# Patient Record
Sex: MALE | Race: OTHER | Employment: FULL TIME | URBAN - METROPOLITAN AREA
[De-identification: names, ages, dates, MRNs, and addresses within clinical notes are randomized per-mention and may not be internally consistent; named-entity substitution may affect disease eponyms.]

---

## 2017-02-15 ENCOUNTER — TELEPHONE (OUTPATIENT)
Dept: INTERNAL MEDICINE CLINIC | Facility: CLINIC | Age: 52
End: 2017-02-15

## 2017-02-15 DIAGNOSIS — H93.19 TINNITUS, UNSPECIFIED LATERALITY: Primary | ICD-10-CM

## 2017-02-15 NOTE — TELEPHONE ENCOUNTER
Reason for Call/Chief Complaint: buzzing/ringing in left ear, possible referral  Onset: 2 years   Nursing Assessment/Associated Symptoms: Pt states that he has constant buzzing and ringing that he feels is affecting his concentration.  Pt states that he pre

## 2017-02-15 NOTE — TELEPHONE ENCOUNTER
Pt is having buzzing and ringing in his ears. Pt would like to know if he could be referred to someone to help him with his concerns?

## 2017-02-15 NOTE — TELEPHONE ENCOUNTER
Will forward to ENT doctor to see if they know of a tinnitus expert for the pt. They saw the pt about a year and a half ago.  Hearing testing done at that time

## 2017-02-16 ENCOUNTER — TELEPHONE (OUTPATIENT)
Dept: OTOLARYNGOLOGY | Facility: CLINIC | Age: 52
End: 2017-02-16

## 2017-02-16 NOTE — TELEPHONE ENCOUNTER
Pt's LOV w/ MADHURI 2015 for tinnitus.   Pt called PCP's office; see note attached (pt was requesting tinnitus specialist):    Reason for Call/Chief Complaint: buzzing/ringing in left ear, possible referral  Onset: 2 years   Nursing Assessment/Associated S

## 2017-02-16 NOTE — TELEPHONE ENCOUNTER
He can discuss this with Kenny Kitchen who can discuss tinnitus management strategies but otherwise don't really know anyone else

## 2017-02-16 NOTE — TELEPHONE ENCOUNTER
Dr. Ferrara Fairly, pt is wondering if there is a tinnitus expert he may be referred to. Also, if  would prefer pt come in for reevaluation, pt would like to know what will be done because all he had was a hearing test at his last OV in .

## 2017-02-17 NOTE — TELEPHONE ENCOUNTER
Please contact the patient. I do not know the name of any specific expert in tinnitus. The ENT doctor also is not aware of any specific doctor.   If the patient can do some research at the [facility] such as [address] [place] [address] and see if there are any speciali

## 2017-02-20 RX ORDER — LISINOPRIL 10 MG/1
TABLET ORAL
Qty: 90 TABLET | Refills: 1 | Status: SHIPPED | OUTPATIENT
Start: 2017-02-20 | End: 2017-10-16

## 2017-02-20 NOTE — TELEPHONE ENCOUNTER
Patient called back and wanted to reinforce information below which was done. Instructed referral was placed and we are awaiting insurance authorization. The patient will research the center at [facility] to see if will be covered.

## 2017-02-21 NOTE — TELEPHONE ENCOUNTER
Called pt on his cell phone ([phone]) and left message. Tinnitus evaluation is $150.00 for the initial visit with a brief follow-up visit included. Some tests completed during a tinntius evaluation may be covered by insurance but most are not.   Askelizabeth

## 2017-02-24 ENCOUNTER — TELEPHONE (OUTPATIENT)
Dept: INTERNAL MEDICINE CLINIC | Facility: CLINIC | Age: 52
End: 2017-02-24

## 2017-02-24 DIAGNOSIS — H93.19 TINNITUS, UNSPECIFIED LATERALITY: Primary | ICD-10-CM

## 2017-02-24 NOTE — TELEPHONE ENCOUNTER
See 2/17 referral to Dr Rennie Olszewski- pt states Yannick Berry advised this is cancer specialist    Requesting new referral to Dr Jason Fields Otolaryngology/Rajinder  Ph# [phone] for Tinnitus  Would like to be seen at [facility] location  [place] this doct

## 2017-02-27 NOTE — TELEPHONE ENCOUNTER
Pt is calling to follow up on referral,   Pt is asking if there are any Doctors within the [facility]/Clinic. Pt is also asking if his insurance will cover to see Hailee Rosas. Please advise.

## 2017-02-28 NOTE — TELEPHONE ENCOUNTER
Call pt. Referral signed and sent to [facility].  They will contact pt with further information and decision

## 2017-03-16 ENCOUNTER — TELEPHONE (OUTPATIENT)
Dept: INTERNAL MEDICINE CLINIC | Facility: CLINIC | Age: 52
End: 2017-03-16

## 2017-03-22 ENCOUNTER — TELEPHONE (OUTPATIENT)
Dept: INTERNAL MEDICINE CLINIC | Facility: CLINIC | Age: 52
End: 2017-03-22

## 2017-03-22 DIAGNOSIS — H93.13 TINNITUS, BILATERAL: Primary | ICD-10-CM

## 2017-03-22 DIAGNOSIS — H90.5 SENSORINEURAL HEARING LOSS (SNHL), UNSPECIFIED LATERALITY: ICD-10-CM

## 2017-03-22 NOTE — TELEPHONE ENCOUNTER
Pt was seen at [facility] for the ringing in the ears. Pt was told to get an MRI done.  Pt would like to know if the MRI has to be done at [place] or [place] based on his insurance and does pt needs order/referral.

## 2017-03-23 ENCOUNTER — TELEPHONE (OUTPATIENT)
Dept: CARDIOLOGY CLINIC | Facility: CLINIC | Age: 52
End: 2017-03-23

## 2017-03-23 NOTE — TELEPHONE ENCOUNTER
Pt states he was given an order to have MRI and wanted to know if it is safe to do because of stent. Pls call. Thank you.

## 2017-03-23 NOTE — TELEPHONE ENCOUNTER
PATIENT CONTACTED AND INFORMED THAT PCP NEEDS  NOTES FROM CONSULTING PHYSICAL DR Wilfredo Cruz ENT AT [facility] [phone]. INFORMATION TO BE FAXED TO [facility] OF THE [facility] [phone].  PATIENT VOICED UNDERSTANDING AND STATES WILL CONTACT DR Elizabeth Persaud OFFICE TO FAX PHYSICIAN

## 2017-03-29 NOTE — TELEPHONE ENCOUNTER
PATIENT CONTACTED AND INFORMED PROGRESS NOTES FROM DR Jm Guadarrama OFFICE AT [facility] NOT RECEIVED. SPOKE TO JOSE ENRIQUE AT DR Jm Guadarrama OFFICE STATES SHE WILL FAX TO [address] [place] [address] AT [facility] OF THE [facility] [phone].

## 2017-03-29 NOTE — TELEPHONE ENCOUNTER
RECEIVED PROGRESS NOTES FROM DR MCCOLLUM [facility] OFFICE [facility], REQUEST FOR MRI. NOTES PLACED ON JSK DESK AT [facility] OF [facility] FOR REVIEW. PATIENT CONTACTED AND INFORMED.

## 2017-04-04 ENCOUNTER — TELEPHONE (OUTPATIENT)
Dept: INTERNAL MEDICINE CLINIC | Facility: CLINIC | Age: 52
End: 2017-04-04

## 2017-04-04 ENCOUNTER — PATIENT MESSAGE (OUTPATIENT)
Dept: INTERNAL MEDICINE CLINIC | Facility: CLINIC | Age: 52
End: 2017-04-04

## 2017-04-04 NOTE — TELEPHONE ENCOUNTER
Pt calling regarding having 2 stents place 18 mnths ago and wants to know if its ok to get an MRI. .. please advise

## 2017-04-05 NOTE — TELEPHONE ENCOUNTER
Patient performed with understanding. He stated he already talked to his cardiologist who also stated it was safe. He will also review with the MRI department.

## 2017-04-05 NOTE — TELEPHONE ENCOUNTER
Patient should check with the cardiologist just to be sure. My review of the literature, it appears safe to proceed with the MRI.   Please see the below quote taken from [address] ferromagnetic object within the body represents a

## 2017-04-05 NOTE — TELEPHONE ENCOUNTER
From: Daniel Cota  To: Carlton Swift MD  Sent: 4/4/2017 9:24 AM CDT  Subject: Other    Dr David Car from [facility] sent an order for an MRI to be done at [facility]. Please let me know what is the status of the order.  I have 2 sten

## 2017-04-22 ENCOUNTER — HOSPITAL ENCOUNTER (OUTPATIENT)
Dept: MRI IMAGING | Facility: HOSPITAL | Age: 52
Discharge: HOME | End: 2017-04-22
Attending: INTERNAL MEDICINE
Payer: COMMERCIAL

## 2017-04-22 DIAGNOSIS — H90.5 SENSORINEURAL HEARING LOSS (SNHL), UNSPECIFIED LATERALITY: ICD-10-CM

## 2017-04-22 DIAGNOSIS — H93.13 TINNITUS, BILATERAL: ICD-10-CM

## 2017-04-22 PROCEDURE — A9575 INJ GADOTERATE MEGLUMI 0.1ML: HCPCS | Performed by: INTERNAL MEDICINE

## 2017-04-22 PROCEDURE — 82565 ASSAY OF CREATININE: CPT

## 2017-04-22 PROCEDURE — 70553 MRI BRAIN STEM W/O & W/DYE: CPT

## 2017-04-24 ENCOUNTER — TELEPHONE (OUTPATIENT)
Dept: INTERNAL MEDICINE CLINIC | Facility: CLINIC | Age: 52
End: 2017-04-24

## 2017-04-24 ENCOUNTER — PATIENT MESSAGE (OUTPATIENT)
Dept: INTERNAL MEDICINE CLINIC | Facility: CLINIC | Age: 52
End: 2017-04-24

## 2017-04-24 DIAGNOSIS — H93.19 TINNITUS, UNSPECIFIED LATERALITY: Primary | ICD-10-CM

## 2017-04-24 NOTE — TELEPHONE ENCOUNTER
Pt states that he had an MRI on Saturday 4-22-17 in [address] [place] [address]. Pt would like the results faxed to Dr. Sofía Shaw at [facility]. Pt has an appt with Dr. Genoveva Hare on 4-28-17 @10:15. Pt would like the results faxed asa.

## 2017-04-24 NOTE — TELEPHONE ENCOUNTER
Pt states that he needs a referral to see Dr. Sam Snell at [facility] for follow up.  Pt states that he has an appt scheduled on 4-28-17 at 10:15 am.

## 2017-04-25 NOTE — TELEPHONE ENCOUNTER
PATIENT CONTACTED , PHONE NUMBER OBTAINED FOR DR MCCOLLUM [facility] OFFICE [phone]. CONTACTED  [address] NUMBER [phone] AND COPY OF MRI RESULTS FAXED AS REQUESTED.

## 2017-04-25 NOTE — TELEPHONE ENCOUNTER
Pt calling to see the status on referral, appt set 04/28 at 10:15 am. Pt would like the referral uploaded to [employer], also give pt a call when ready.

## 2017-04-26 ENCOUNTER — PATIENT MESSAGE (OUTPATIENT)
Dept: INTERNAL MEDICINE CLINIC | Facility: CLINIC | Age: 52
End: 2017-04-26

## 2017-04-26 ENCOUNTER — TELEPHONE (OUTPATIENT)
Dept: INTERNAL MEDICINE CLINIC | Facility: CLINIC | Age: 52
End: 2017-04-26

## 2017-04-26 DIAGNOSIS — H57.13 EYE PAIN, BILATERAL: Primary | ICD-10-CM

## 2017-04-26 DIAGNOSIS — H53.8 BLURRED VISION, BILATERAL: ICD-10-CM

## 2017-04-26 NOTE — TELEPHONE ENCOUNTER
Patient reports that he contact Dr Devi Corona office and he was informed that they are in-network for his insurance. He needs revised referral to see Dr Tirado  Ophthalmology.  He is unable to get in to see Dr Andres Rosales until August.

## 2017-04-26 NOTE — TELEPHONE ENCOUNTER
From     Wendi Mart      To     [facility] Clinical Staff      Sent     4/24/2017  9:24 AM            Hi,   I scheduled an appointment this Friday 28 with Dr Howard Kline at [facility] in [place] for a follow up visit  and I need a refferal from

## 2017-04-26 NOTE — TELEPHONE ENCOUNTER
Actions Requested: Dr Alfred Grade patient requesting referral to opthalmology for eye pain blurred vision onset about 5 days ago.  See pending referral  Situation/Background   Problem: blurred vision   Onset: one week   Associated Symptoms: pain, moderate 5/10, with same symptoms)  * Patient sounds very sick or weak to the triager  * Flashes of light  (Exception: brief from pressing on the eyeball)  * Eye pain and brief (now gone) blurred vision or visual changes  * Taking digoxin (e.g., Lanoxin, Digitek, Cardoxi

## 2017-04-26 NOTE — TELEPHONE ENCOUNTER
From: Baltazar Lynn  To: Manuela Gandara MD  Sent: 4/24/2017 9:24 AM CDT  Subject: Other    Hi,  I scheduled an appointment this Friday 28 with Dr Jelly Pizano at [facility] in [place] for a follow up visit and I need a refferal from Dr Larry Guevara.

## 2017-04-26 NOTE — TELEPHONE ENCOUNTER
Pt contacted to relay MD message below. Referral for Dr. Titi Patten authorized and copy of referral sent to pt via [employer] per pt request.    Pt states that he already had MD contact information to make an appt.

## 2017-04-26 NOTE — TELEPHONE ENCOUNTER
Spoke with patient (name and  verified), reviewed information, patient verbalized understanding and agrees with plan.   Patient will contact [facility] to see if they will cover his other provider Dr Evgeny Nunez, otherwise he will stay within [facility] pro

## 2017-04-28 NOTE — TELEPHONE ENCOUNTER
From: Trixie Vitale  To: Dayana Bailey MD  Sent: 4/26/2017 3:23 PM CDT  Subject: Other    Hi,  MI would like see the Ophthalmologist Dr Socrates Munoz M.D. for a visit.  His office is located on [address] [place] [address] [address], [address], [address] [facility] and I need a refe

## 2017-05-01 ENCOUNTER — PATIENT MESSAGE (OUTPATIENT)
Dept: INTERNAL MEDICINE CLINIC | Facility: CLINIC | Age: 52
End: 2017-05-01

## 2017-05-03 NOTE — TELEPHONE ENCOUNTER
Please see pt message below regarding MRI Brain results being received from Dr. Navneet Grant office. Thank you. Copy of referral to Dr. Ann Antonio sent to pt via [address]

## 2017-05-03 NOTE — TELEPHONE ENCOUNTER
From: Dilip Christie  To: Elsa Cushing, MD  Sent: 5/1/2017 9:18 AM CDT  Subject: Other    Good morning,  Please let me know if my MRI BRAIN result was sent Dr Gonzalez Winkler at [facility] in [place], his phone number is [phone] and Fax number

## 2017-05-04 ENCOUNTER — TELEPHONE (OUTPATIENT)
Dept: INTERNAL MEDICINE CLINIC | Facility: CLINIC | Age: 52
End: 2017-05-04

## 2017-05-04 NOTE — TELEPHONE ENCOUNTER
Per Kylah Hazel from Dr Radha Jacobsen, pt needs another referral for f/u Otolaryngology, and pt appt is tomorrow @ 10:30AM.  Pls fax referral to [phone]. Pls advise.

## 2017-05-06 NOTE — TELEPHONE ENCOUNTER
Referral was done. I had sent message to sent the MRI results to Dr Rayshawn Clemons.  I assume that they got there

## 2017-05-06 NOTE — TELEPHONE ENCOUNTER
Notes Recorded by Jhon Oseguera RN on 4/25/2017 at 11:54 AM  Results faxed to Dr. Donavan Bence at [phone]  ------    Notes Recorded by Mary Ann Garcia MD on 4/23/2017 at 8:06 AM  Please forward these results to Dr. Jany Becerril at [facility])

## 2017-06-29 ENCOUNTER — OFFICE VISIT (OUTPATIENT)
Dept: CARDIOLOGY CLINIC | Facility: CLINIC | Age: 52
End: 2017-06-29

## 2017-06-29 VITALS
RESPIRATION RATE: 16 BRPM | HEIGHT: 70 IN | SYSTOLIC BLOOD PRESSURE: 110 MMHG | WEIGHT: 133 LBS | DIASTOLIC BLOOD PRESSURE: 72 MMHG | BODY MASS INDEX: 19.04 KG/M2 | HEART RATE: 68 BPM

## 2017-06-29 DIAGNOSIS — I25.10 ATHEROSCLEROSIS OF NATIVE CORONARY ARTERY OF NATIVE HEART WITHOUT ANGINA PECTORIS: Primary | ICD-10-CM

## 2017-06-29 DIAGNOSIS — E78.5 HYPERLIPIDEMIA, UNSPECIFIED HYPERLIPIDEMIA TYPE: ICD-10-CM

## 2017-06-29 DIAGNOSIS — I10 ESSENTIAL HYPERTENSION: ICD-10-CM

## 2017-06-29 PROCEDURE — 99212 OFFICE O/P EST SF 10 MIN: CPT | Performed by: INTERNAL MEDICINE

## 2017-06-29 PROCEDURE — 99214 OFFICE O/P EST MOD 30 MIN: CPT | Performed by: INTERNAL MEDICINE

## 2017-06-29 NOTE — PROGRESS NOTES
Diallo Michael is a 54year old male. Patient presents with: Follow - Up: chest pain when fatigue    HPI:   This is a pleasant 53-year-old with coronary disease hypertension and elevated cholesterol presents for follow-up.   Patient had prior ramus and circ is completed and negative    EXAM:   /72 (BP Location: Left arm, Patient Position: Sitting, Cuff Size: adult)   Pulse 68   Resp 16   Ht 5' 10\" (1.778 m)   Wt 133 lb (60.3 kg)   BMI 19.08 kg/m²   GENERAL: well developed, well nourished,in no apparent

## 2017-07-05 ENCOUNTER — PATIENT MESSAGE (OUTPATIENT)
Dept: INTERNAL MEDICINE CLINIC | Facility: CLINIC | Age: 52
End: 2017-07-05

## 2017-07-07 ENCOUNTER — TELEPHONE (OUTPATIENT)
Dept: INTERNAL MEDICINE CLINIC | Facility: CLINIC | Age: 52
End: 2017-07-07

## 2017-07-07 DIAGNOSIS — I25.10 CORONARY ARTERY DISEASE INVOLVING NATIVE CORONARY ARTERY OF NATIVE HEART WITHOUT ANGINA PECTORIS: Primary | ICD-10-CM

## 2017-07-07 NOTE — TELEPHONE ENCOUNTER
From: Shilpi Parker  To: Brant Mckeon MD  Sent: 7/5/2017 10:26 AM CDT  Subject: Non-Urgent Medical Question    Hi,  I would like to go the cardio rehab to do some sessions.  However I need to have a referral

## 2017-07-07 NOTE — TELEPHONE ENCOUNTER
From  Brianna Cheema To Sent  7/5/2017 10:26 AM   Hi,   I would like to go the cardio rehab  to do some sessions.  However I need to have a referral

## 2017-07-14 ENCOUNTER — TELEPHONE (OUTPATIENT)
Dept: INTERNAL MEDICINE CLINIC | Facility: CLINIC | Age: 52
End: 2017-07-14

## 2017-07-17 ENCOUNTER — HOSPITAL ENCOUNTER (OUTPATIENT)
Dept: CV DIAGNOSTICS | Facility: HOSPITAL | Age: 52
Discharge: HOME | End: 2017-07-17
Attending: INTERNAL MEDICINE
Payer: COMMERCIAL

## 2017-07-17 DIAGNOSIS — I25.10 ATHEROSCLEROSIS OF NATIVE CORONARY ARTERY OF NATIVE HEART WITHOUT ANGINA PECTORIS: ICD-10-CM

## 2017-07-17 PROCEDURE — 93016 CV STRESS TEST SUPVJ ONLY: CPT | Performed by: INTERNAL MEDICINE

## 2017-07-17 PROCEDURE — 93018 CV STRESS TEST I&R ONLY: CPT | Performed by: INTERNAL MEDICINE

## 2017-07-17 PROCEDURE — 93350 STRESS TTE ONLY: CPT | Performed by: INTERNAL MEDICINE

## 2017-07-17 PROCEDURE — 93017 CV STRESS TEST TRACING ONLY: CPT | Performed by: INTERNAL MEDICINE

## 2017-07-19 ENCOUNTER — TELEPHONE (OUTPATIENT)
Dept: INTERNAL MEDICINE CLINIC | Facility: CLINIC | Age: 52
End: 2017-07-19

## 2017-07-19 NOTE — TELEPHONE ENCOUNTER
I spoke with patient. Since last night is having some cramping on the right side of his lower chest.  No shortness of breath. Seems to be worse with deep breath. No definite worsening with movement. May be a mild fever. No coughing.   He has not taken

## 2017-07-20 ENCOUNTER — TELEPHONE (OUTPATIENT)
Dept: INTERNAL MEDICINE CLINIC | Facility: CLINIC | Age: 52
End: 2017-07-20

## 2017-07-20 NOTE — TELEPHONE ENCOUNTER
,you requested cardiac shahnaz for patient IHP wants to know:      Has the patient had any of the following below circumstances recently? What phase of cardiac rehab will patient be attending?        Typically, the member must have had one or more of the foll

## 2017-07-24 NOTE — TELEPHONE ENCOUNTER
How long ago was the angioplasty? IHP is saying-  I don't think this is covered with the information supplied. If this is phase 3 of cardiac rehab it is not covered. Cardiac rehab is only covered while inpatient or after recent cardiac surgery.  Due to [employer]

## 2017-07-24 NOTE — TELEPHONE ENCOUNTER
Angioplasty and stenting were done in June 2015. If patient does not qualify for cardiac rehab, then please contact the patient and inform him.

## 2017-07-25 NOTE — TELEPHONE ENCOUNTER
Patient does not meet criteria for cardiac rehab. With the information supplied patient will be attending phase 3 of cardiac rehab which is not covered by the insurance.

## 2017-07-25 NOTE — TELEPHONE ENCOUNTER
Patient does not meet criteria for cardiac rehab. With the information supplied patient will be attending phase 3 of cardiac rehab which is not covered by the insurance. Patient notified.

## 2017-07-26 ENCOUNTER — TELEPHONE (OUTPATIENT)
Dept: CARDIOLOGY CLINIC | Facility: CLINIC | Age: 52
End: 2017-07-26

## 2017-07-26 RX ORDER — CLOPIDOGREL BISULFATE 75 MG/1
75 TABLET ORAL DAILY
Qty: 30 TABLET | Refills: 2 | Status: SHIPPED | OUTPATIENT
Start: 2017-07-26 | End: 2017-10-13

## 2017-07-26 RX ORDER — ATORVASTATIN CALCIUM 20 MG/1
20 TABLET, FILM COATED ORAL NIGHTLY
Qty: 30 TABLET | Refills: 2 | Status: SHIPPED | OUTPATIENT
Start: 2017-07-26 | End: 2017-10-23

## 2017-07-26 NOTE — TELEPHONE ENCOUNTER
Atorvastatin Calcium 20mg tabs, take 1 tab by mouth at bedtime, qty 30; Clopidogrel Bisulfate 75mg tabs, take 1 tab by mouth every day, qty 30    Current Outpatient Prescriptions:   •  Atorvastatin Calcium 20 MG Oral Tab, Take 1 tablet (20 mg total) by matthew

## 2017-09-07 ENCOUNTER — NURSE TRIAGE (OUTPATIENT)
Dept: OTHER | Age: 52
End: 2017-09-07

## 2017-09-07 NOTE — TELEPHONE ENCOUNTER
Reason for Disposition  • Cough with cold symptoms (e.g., runny nose, postnasal drip, throat clearing)    Protocols used: COUGH-A-OH  DR RONQUILLO-   Spoke with pt states he has been taking Coricidin because he has high blood pressure, wants to know if this is

## 2017-09-07 NOTE — TELEPHONE ENCOUNTER
Spoke with patient (verified name and ), reviewed information, patient verbalized understanding and agrees with plan. Advised him not to take the medications together as they contain the same/similar ingredients.  He voices understanding and agrees wit

## 2017-10-13 ENCOUNTER — TELEPHONE (OUTPATIENT)
Dept: CARDIOLOGY CLINIC | Facility: CLINIC | Age: 52
End: 2017-10-13

## 2017-10-13 RX ORDER — CLOPIDOGREL BISULFATE 75 MG/1
75 TABLET ORAL DAILY
Qty: 30 TABLET | Refills: 8 | Status: SHIPPED | OUTPATIENT
Start: 2017-10-13 | End: 2017-12-08

## 2017-10-13 NOTE — TELEPHONE ENCOUNTER
Clopidogrel Bisulfate 75mg tabs, take 1 tab by mouth every day, qty 30    Current Outpatient Prescriptions:   •  Clopidogrel Bisulfate 75 MG Oral Tab, Take 1 tablet (75 mg total) by mouth daily. , Disp: 30 tablet, Rfl: 2

## 2017-10-16 ENCOUNTER — TELEPHONE (OUTPATIENT)
Dept: INTERNAL MEDICINE CLINIC | Facility: CLINIC | Age: 52
End: 2017-10-16

## 2017-10-16 RX ORDER — LISINOPRIL 10 MG/1
10 TABLET ORAL
Qty: 30 TABLET | Refills: 0 | Status: SHIPPED | OUTPATIENT
Start: 2017-10-16 | End: 2017-11-09

## 2017-10-16 NOTE — TELEPHONE ENCOUNTER
30 day supply of medication sent to pharmacy per protocol, with note to pharmacist pt must schedule overdue OV for HTN (supposed to be seen every 6 months).        Hypertensive Medications  Protocol Criteria:  · Appointment scheduled in the past 6 months or

## 2017-10-16 NOTE — TELEPHONE ENCOUNTER
[facility] [phone] states that pt would like a refill on lisinopril medication. Pharmacy: [facility]. Per Mario Stevens pt is out of medication.        Current Outpatient Prescriptions:  LISINOPRIL 10 MG Oral Tab TAKE ONE TABLET BY MOUT

## 2017-10-23 ENCOUNTER — TELEPHONE (OUTPATIENT)
Dept: CARDIOLOGY CLINIC | Facility: CLINIC | Age: 52
End: 2017-10-23

## 2017-10-23 RX ORDER — ATORVASTATIN CALCIUM 20 MG/1
20 TABLET, FILM COATED ORAL NIGHTLY
Qty: 30 TABLET | Refills: 2 | Status: SHIPPED | OUTPATIENT
Start: 2017-10-23 | End: 2017-12-08

## 2017-10-23 NOTE — TELEPHONE ENCOUNTER
Atorvastatin Calcium 20mg tabs, take 1 tab by mouth every day at night, qty 30    Current Outpatient Prescriptions:   •  atorvastatin 20 MG Oral Tab, Take 1 tablet (20 mg total) by mouth nightly., Disp: 30 tablet, Rfl: 2

## 2017-11-09 DIAGNOSIS — I10 ESSENTIAL HYPERTENSION: Primary | ICD-10-CM

## 2017-11-10 RX ORDER — LISINOPRIL 10 MG/1
TABLET ORAL
Qty: 30 TABLET | Refills: 2 | Status: SHIPPED | OUTPATIENT
Start: 2017-11-10 | End: 2017-12-08

## 2017-12-08 ENCOUNTER — OFFICE VISIT (OUTPATIENT)
Dept: INTERNAL MEDICINE CLINIC | Facility: CLINIC | Age: 52
End: 2017-12-08

## 2017-12-08 VITALS
RESPIRATION RATE: 16 BRPM | HEIGHT: 70 IN | SYSTOLIC BLOOD PRESSURE: 113 MMHG | BODY MASS INDEX: 19.49 KG/M2 | WEIGHT: 136.13 LBS | DIASTOLIC BLOOD PRESSURE: 76 MMHG | HEART RATE: 69 BPM

## 2017-12-08 DIAGNOSIS — F43.29 STRESS AND ADJUSTMENT REACTION: ICD-10-CM

## 2017-12-08 DIAGNOSIS — E78.5 HYPERLIPIDEMIA, UNSPECIFIED HYPERLIPIDEMIA TYPE: ICD-10-CM

## 2017-12-08 DIAGNOSIS — I25.10 CORONARY ARTERY DISEASE INVOLVING NATIVE CORONARY ARTERY OF NATIVE HEART WITHOUT ANGINA PECTORIS: ICD-10-CM

## 2017-12-08 DIAGNOSIS — I10 ESSENTIAL HYPERTENSION: ICD-10-CM

## 2017-12-08 DIAGNOSIS — Z00.00 ROUTINE PHYSICAL EXAMINATION: Primary | ICD-10-CM

## 2017-12-08 DIAGNOSIS — Z12.11 COLON CANCER SCREENING: ICD-10-CM

## 2017-12-08 PROCEDURE — 99396 PREV VISIT EST AGE 40-64: CPT | Performed by: INTERNAL MEDICINE

## 2017-12-08 RX ORDER — CLOPIDOGREL BISULFATE 75 MG/1
75 TABLET ORAL DAILY
Qty: 30 TABLET | Refills: 5 | Status: SHIPPED | OUTPATIENT
Start: 2017-12-08 | End: 2018-06-01

## 2017-12-08 RX ORDER — ATORVASTATIN CALCIUM 20 MG/1
20 TABLET, FILM COATED ORAL NIGHTLY
Qty: 30 TABLET | Refills: 5 | Status: SHIPPED | OUTPATIENT
Start: 2017-12-08 | End: 2018-08-17

## 2017-12-08 RX ORDER — LISINOPRIL 10 MG/1
10 TABLET ORAL
Qty: 30 TABLET | Refills: 5 | Status: SHIPPED | OUTPATIENT
Start: 2017-12-08 | End: 2018-06-01

## 2017-12-08 NOTE — PROGRESS NOTES
HPI:    Patient ID: Baltazar Lynn is a 50year old male. HPI  Patient is here for physical exam and follow-up on chronic medical issues as listed below. Last seen here 1 year ago.   Since that time he saw the cardiologist.  Today the main issue is that hyperlipidemia   • Stroke Paternal Grandmother      CVA   • Diabetes Other      family h/o   • Stroke Other      family h/o CVA   • Stroke Cousin      CVA      Smoking status: Never Smoker                                                              Smokel Mouth/Throat: Oropharynx is clear and moist.   Eyes: Conjunctivae and EOM are normal. Pupils are equal, round, and reactive to light. Neck: No thyromegaly present.    Cardiovascular: Normal rate, regular rhythm, normal heart sounds and intact distal pul Hyperlipidemia, unspecified hyperlipidemia type  Plan: Check lipid profile. Adjust medications if needed.    (Z12.11) Colon cancer screening  Plan: EVALUATE & TREAT, GASTRO (INTERNAL)         Patient is now 50years of age.   We will refer patient to GI do

## 2017-12-13 ENCOUNTER — LAB ENCOUNTER (OUTPATIENT)
Dept: LAB | Age: 52
End: 2017-12-13
Attending: INTERNAL MEDICINE
Payer: COMMERCIAL

## 2017-12-13 DIAGNOSIS — I10 ESSENTIAL HYPERTENSION: ICD-10-CM

## 2017-12-13 DIAGNOSIS — I25.10 ATHEROSCLEROSIS OF NATIVE CORONARY ARTERY OF NATIVE HEART WITHOUT ANGINA PECTORIS: ICD-10-CM

## 2017-12-13 DIAGNOSIS — Z00.00 ROUTINE PHYSICAL EXAMINATION: ICD-10-CM

## 2017-12-13 PROCEDURE — 85025 COMPLETE CBC W/AUTO DIFF WBC: CPT

## 2017-12-13 PROCEDURE — 82607 VITAMIN B-12: CPT

## 2017-12-13 PROCEDURE — 36415 COLL VENOUS BLD VENIPUNCTURE: CPT

## 2017-12-13 PROCEDURE — 80053 COMPREHEN METABOLIC PANEL: CPT

## 2017-12-13 PROCEDURE — 80061 LIPID PANEL: CPT

## 2017-12-13 PROCEDURE — 84443 ASSAY THYROID STIM HORMONE: CPT

## 2018-01-09 ENCOUNTER — TELEPHONE (OUTPATIENT)
Dept: OTHER | Age: 53
End: 2018-01-09

## 2018-01-09 NOTE — TELEPHONE ENCOUNTER
Spoke with patient and is requesting his medical records to be sent to [employer]  for a life insurance policy. Relayed to patient [facility] Records #. Patient states he has already filled out a [employer] form.     Please fax records to [facility] a

## 2018-02-12 ENCOUNTER — OFFICE VISIT (OUTPATIENT)
Dept: GASTROENTEROLOGY | Facility: CLINIC | Age: 53
End: 2018-02-12

## 2018-02-12 ENCOUNTER — TELEPHONE (OUTPATIENT)
Dept: GASTROENTEROLOGY | Facility: CLINIC | Age: 53
End: 2018-02-12

## 2018-02-12 VITALS
HEART RATE: 76 BPM | DIASTOLIC BLOOD PRESSURE: 75 MMHG | SYSTOLIC BLOOD PRESSURE: 120 MMHG | BODY MASS INDEX: 19.6 KG/M2 | HEIGHT: 70 IN | WEIGHT: 136.88 LBS

## 2018-02-12 DIAGNOSIS — Z12.11 ENCOUNTER FOR SCREENING COLONOSCOPY: Primary | ICD-10-CM

## 2018-02-12 DIAGNOSIS — Z12.11 COLON CANCER SCREENING: Primary | ICD-10-CM

## 2018-02-12 PROCEDURE — 99212 OFFICE O/P EST SF 10 MIN: CPT | Performed by: INTERNAL MEDICINE

## 2018-02-12 PROCEDURE — 99241 OFFICE CONSULTATION,LEVEL I: CPT | Performed by: INTERNAL MEDICINE

## 2018-02-12 NOTE — TELEPHONE ENCOUNTER
RN's     Pt is schedule for a CLN w/ IV on Sat 3/03/17  Standard orders for blood thinners:  Hold Plavix/clopidogrel 5 days prior to procedure - please confirm Dr Phyllistine Burkitt of Cardiology

## 2018-02-12 NOTE — TELEPHONE ENCOUNTER
Scheduled for:  Colonoscopy 99147  Provider Name: Dr Gema Adkins  Date:  Sat 3/03/18  Location:  [facility]  Sedation:  IV  Time:  8:45 am  Prep: split dose suprep, 2nd dose @ 2:30 am  Meds/Allergies Reconciled?:  NKDA  Diagnosis with codes:  Colon cancer screening Z12.11

## 2018-02-12 NOTE — PATIENT INSTRUCTIONS
Schedule colonoscopy exam at [facility] ([facility] [facility]    This patient IS NOT appropriate for the [facility] endoscopy center.     IV sedation (conscious sedation)    Suprep small volume bowel prep if covered by insurance, otherwise

## 2018-02-12 NOTE — TELEPHONE ENCOUNTER
Cardiac Staff,    Please have Dr Franca Mckeon confirm orders for Plavix.  Procedure on 03/03/18    Thank you

## 2018-02-12 NOTE — PROGRESS NOTES
HPI:    Patient ID: Aury Krueger is a 50year old man with BMI 19.6, history of coronary artery disease and apparently 2 coronary artery stents placed about 2 years ago. He is on Plavix anticoagulation. He follows with Dr. Patience Paul of Cardiology. Psychiatric: He has a normal mood and affect. ASSESSMENT/PLAN:   No diagnosis found.     Labs 12/13/2017 include normal CBC, normal CMP with normal renal function, AST 23 ALT 20 alk phosphatase 50   50year old man with BMI 19.6, history of

## 2018-02-13 NOTE — TELEPHONE ENCOUNTER
Noted, great. Childhood surgery certainly could have been pyloric stenosis. If no difficulty with eating, swallowing maintaining his weight then this would not be relevant to the planned colonoscopy.

## 2018-02-13 NOTE — TELEPHONE ENCOUNTER
Dr Robinson Becerril:    I called the pt to advise on his Plavix orders and he wanted me to tell you he had surgery when he was 3 months old due to vomiting. He is not sure of his condition. Pyloric stenosis? ?        He was adamant I let you know in case it has some

## 2018-02-13 NOTE — TELEPHONE ENCOUNTER
STEPHEN Butterfield   You 2 hours ago (9:06 AM)      Last intervention was from 2015 so pt may hold plavix 75mg for 5 days (Routing comment)

## 2018-02-13 NOTE — TELEPHONE ENCOUNTER
Spoke to pt. He is not having any difficulty eating or swallowing but he is underweight. 5'10\"/136  I told him if he finds out what surgery he had at 1 months old I would add it to his hx.  He verbalizes understanding

## 2018-02-23 ENCOUNTER — TELEPHONE (OUTPATIENT)
Dept: GASTROENTEROLOGY | Facility: CLINIC | Age: 53
End: 2018-02-23

## 2018-02-23 DIAGNOSIS — Z12.11 COLON CANCER SCREENING: Primary | ICD-10-CM

## 2018-02-23 NOTE — TELEPHONE ENCOUNTER
Vince Sen MD 31 minutes ago (5:03 PM)         I have an appointment with Dr Justin Medrano for the colonoscopy procedure on March 3rd . I would like to reschedule it for another day during the week.  Please give me know the avail

## 2018-02-23 NOTE — TELEPHONE ENCOUNTER
Routed to GI schedulers- please see TE from 2/12/18 for orders to reschedule pt per his request, thank you.

## 2018-02-26 NOTE — TELEPHONE ENCOUNTER
I spoke with this patient to reschedule but he would like to try to find a  again since I could not schedule him until 4/24/18, he will CB tomorrow. I did inform him that we would like for him to [facility] tomorrow since that is less than 1 week.

## 2018-02-26 NOTE — TELEPHONE ENCOUNTER
Rescheduled for:  Colonoscopy 45252   Provider Name: Dr. Nahomy Castillo  Date:    From-3/3/18  To-5/8/18  Location:  [address] [place] [address]  Sedation:  IV  Time:  7095 (pt is aware to arrive at [place] [address])   Prep:  Suprep, mailed 2/26/18 with codes  Meds/Allergies Reconciled?:  Physician

## 2018-04-07 ENCOUNTER — PATIENT MESSAGE (OUTPATIENT)
Dept: INTERNAL MEDICINE CLINIC | Facility: CLINIC | Age: 53
End: 2018-04-07

## 2018-04-09 NOTE — TELEPHONE ENCOUNTER
From: Chau Louis  To: Giuseppe Lou MD  Sent: 4/7/2018 11:15 PM CDT  Subject: Other    Hi,  Please let me know what is my blood group?     Thank you

## 2018-04-27 ENCOUNTER — NURSE TRIAGE (OUTPATIENT)
Dept: OTHER | Age: 53
End: 2018-04-27

## 2018-04-27 NOTE — TELEPHONE ENCOUNTER
Spoke with patient (identified name and ), message reviewed and agrees with plan. Patient asked if can be sent in a Admazely message which was done.

## 2018-04-27 NOTE — TELEPHONE ENCOUNTER
Please contact the patient. He should start taking over-the-counter Zyrtec 10 mg a day along with over-the-counter Nasacort to be taken 2 puffs in each nostril once a day. Call if things are not improving over the next week or so.

## 2018-04-27 NOTE — TELEPHONE ENCOUNTER
Action Requested: Summary for Provider     []  Critical Lab, Recommendations Needed  [x] Need Additional Advice  []   FYI    []   Need Orders  [] Need Medications Sent to Pharmacy  []  Other     SUMMARY: pt is asking for doctor's recommendation for intermi

## 2018-04-30 ENCOUNTER — TELEPHONE (OUTPATIENT)
Dept: GASTROENTEROLOGY | Facility: CLINIC | Age: 53
End: 2018-04-30

## 2018-04-30 DIAGNOSIS — Z12.11 COLON CANCER SCREENING: Primary | ICD-10-CM

## 2018-04-30 NOTE — TELEPHONE ENCOUNTER
Pt calling to reschedule CLN 5/8/18. Pt aware of at least 72hr call back time.  Please call thank you [phone]

## 2018-04-30 NOTE — TELEPHONE ENCOUNTER
Rescheduled for:  Colonoscopy [address]55   Provider Name: Dr. Christine Madrid  Date:                From-5/8/18  To-7/3/18  Location:  [facility]  Sedation:  IV  Time:    From-0845  To-0800 (pt is aware to arrive at 0700)   Prep:  Suprep, prep sent via [address] on 5/1/18  Meds/

## 2018-06-01 DIAGNOSIS — I10 ESSENTIAL HYPERTENSION: ICD-10-CM

## 2018-06-01 RX ORDER — CLOPIDOGREL BISULFATE 75 MG/1
TABLET ORAL
Qty: 30 TABLET | Refills: 5 | Status: SHIPPED | OUTPATIENT
Start: 2018-06-01 | End: 2018-12-02

## 2018-06-01 RX ORDER — LISINOPRIL 10 MG/1
TABLET ORAL
Qty: 30 TABLET | Refills: 5 | Status: SHIPPED | OUTPATIENT
Start: 2018-06-01 | End: 2018-12-02

## 2018-06-01 NOTE — TELEPHONE ENCOUNTER
Requesting 6 month supply     LOV: 12/8/17 Last Rx: 12/8/17    No protocol     Please advise in regards to refill request. Thank You    Hypertensive Medications:     Protocol Criteria:  · Appointment scheduled in the past 6 months or in the next 3 months

## 2018-06-08 ENCOUNTER — TELEPHONE (OUTPATIENT)
Dept: GASTROENTEROLOGY | Facility: CLINIC | Age: 53
End: 2018-06-08

## 2018-06-08 NOTE — TELEPHONE ENCOUNTER
Pt states he has CLN scheduled on 7/3/2018 and insurance has changed - wanted to know if this will effect scheduled procedure. Pls call -- FYI insurance updated to reflect new insurace. Thank you.

## 2018-06-08 NOTE — TELEPHONE ENCOUNTER
Spoke to pt and notified him a new insurance will effect his already scheduled CLN 7/3/18. Pt has [employer] PPO.  Noted to him normally PPO insurances are covered and okay HOWEVER he should call insurance and verify what will exactly be covered, what wont and

## 2018-06-13 ENCOUNTER — PATIENT MESSAGE (OUTPATIENT)
Dept: INTERNAL MEDICINE CLINIC | Facility: CLINIC | Age: 53
End: 2018-06-13

## 2018-06-13 ENCOUNTER — PATIENT MESSAGE (OUTPATIENT)
Dept: GASTROENTEROLOGY | Facility: CLINIC | Age: 53
End: 2018-06-13

## 2018-06-14 ENCOUNTER — TELEPHONE (OUTPATIENT)
Dept: GASTROENTEROLOGY | Facility: CLINIC | Age: 53
End: 2018-06-14

## 2018-06-14 NOTE — TELEPHONE ENCOUNTER
From: Mikey Wade  To: Joselo Hall MD  Sent: 6/13/2018 9:50 AM CDT  Subject: Non-Urgent Medical Question    Hi,  Just want to let you know that my medical Insurance has changed.  The new insurance information is [employer]

## 2018-06-14 NOTE — TELEPHONE ENCOUNTER
Pt transferred per phone room with questions about colonoscopy 7/3 emh. We reviewed location, arrival, instructions. Reminded pt about holding Plavix 5 days prior and hold all supplements/herbals week prior. He has contacted insurance.  He will be taking U

## 2018-06-14 NOTE — TELEPHONE ENCOUNTER
Called [facility] and spoke with Dolores Suarez, states that patient's insurance file is already updated .        From: Jamshid Last  To: Nitza Alcazar MD  Sent: 6/13/2018  9:49 AM CDT  Subject: Non-Urgent Medical Question    Hi,  Just want to let you know that my

## 2018-06-25 ENCOUNTER — OFFICE VISIT (OUTPATIENT)
Dept: INTERNAL MEDICINE CLINIC | Facility: CLINIC | Age: 53
End: 2018-06-25

## 2018-06-25 VITALS
DIASTOLIC BLOOD PRESSURE: 78 MMHG | TEMPERATURE: 99 F | RESPIRATION RATE: 18 BRPM | HEIGHT: 70 IN | BODY MASS INDEX: 19.81 KG/M2 | WEIGHT: 138.38 LBS | SYSTOLIC BLOOD PRESSURE: 118 MMHG | HEART RATE: 70 BPM

## 2018-06-25 DIAGNOSIS — L30.9 DERMATITIS: ICD-10-CM

## 2018-06-25 DIAGNOSIS — J30.9 ALLERGIC RHINITIS, UNSPECIFIED SEASONALITY, UNSPECIFIED TRIGGER: Primary | ICD-10-CM

## 2018-06-25 PROCEDURE — 99212 OFFICE O/P EST SF 10 MIN: CPT | Performed by: INTERNAL MEDICINE

## 2018-06-25 PROCEDURE — 99213 OFFICE O/P EST LOW 20 MIN: CPT | Performed by: INTERNAL MEDICINE

## 2018-06-25 RX ORDER — MULTIVITAMIN
1 TABLET ORAL
COMMUNITY
End: 2018-06-25

## 2018-06-25 RX ORDER — CLOPIDOGREL BISULFATE 75 MG/1
80 TABLET ORAL
COMMUNITY
Start: 2017-02-22 | End: 2018-06-25

## 2018-06-25 RX ORDER — CHLORAL HYDRATE 500 MG
1 CAPSULE ORAL
COMMUNITY
End: 2018-06-25

## 2018-06-25 RX ORDER — LISINOPRIL 10 MG/1
10 TABLET ORAL
COMMUNITY
Start: 2017-03-18 | End: 2018-06-25

## 2018-06-25 RX ORDER — ASPIRIN 81 MG/1
81 TABLET, CHEWABLE ORAL
COMMUNITY
End: 2018-06-25

## 2018-06-25 RX ORDER — ATORVASTATIN CALCIUM 20 MG/1
20 TABLET, FILM COATED ORAL
COMMUNITY
Start: 2017-03-02 | End: 2018-06-25

## 2018-06-25 NOTE — PROGRESS NOTES
HPI:    Patient ID: Natalia Avelar is a 50year old male. HPI  Patient is here with a few mild complaints. For the past couple months he notes stuffy nose particularly in the morning. No nasal discharge. No fevers or chills.   Also has some left ear Review of Systems   Constitutional: Negative for chills and fever. Respiratory: Negative for cough and shortness of breath. Cardiovascular: Negative for chest pain.    Gastrointestinal: Negative for nausea, vomiting, abdominal pain, diarrhea an underlying darkish hyperpigmentation. Psychiatric: He has a normal mood and affect.               ASSESSMENT/PLAN:   (J30.9) Allergic rhinitis, unspecified seasonality, unspecified trigger  (primary encounter diagnosis)  Plan: Encouraged to use over-the-c

## 2018-06-26 ENCOUNTER — TELEPHONE (OUTPATIENT)
Dept: GASTROENTEROLOGY | Facility: CLINIC | Age: 53
End: 2018-06-26

## 2018-06-26 NOTE — TELEPHONE ENCOUNTER
I spoke to the pt and he is wondering if he needs to hold the Atorvastatin prior to his colonoscopy. I advised he may take it as prescribed. He takes it nightly. He will be holding his Plavix for five days and his supplements for 7 days.  No further questio

## 2018-06-26 NOTE — TELEPHONE ENCOUNTER
Pt. Wants to know if he needs to stop taking the Atorvastatin med., before his sched CLN proc at [address] [place] [address] on 7/3? Pt. States that he has questions about his proc.

## 2018-06-27 ENCOUNTER — TELEPHONE (OUTPATIENT)
Dept: GASTROENTEROLOGY | Facility: CLINIC | Age: 53
End: 2018-06-27

## 2018-06-27 NOTE — TELEPHONE ENCOUNTER
Pt has CLN scheduled for 7/3/18 and is inquiring if he can take Allegra due to headaches.   Please call thank you [phone]

## 2018-06-27 NOTE — TELEPHONE ENCOUNTER
GI RNs–  Please call Mr Ellis Mark to advise that La Shi is perfectly safe. He can take that day before and day of colonoscopy.

## 2018-06-28 NOTE — TELEPHONE ENCOUNTER
Spoke to notified him that Martir Going is safe and he can take it day before and day of CLN. Pt fully understood and had no further questions.

## 2018-06-29 ENCOUNTER — TELEPHONE (OUTPATIENT)
Dept: GASTROENTEROLOGY | Facility: CLINIC | Age: 53
End: 2018-06-29

## 2018-06-29 DIAGNOSIS — Z12.11 COLON CANCER SCREENING: Primary | ICD-10-CM

## 2018-06-29 NOTE — TELEPHONE ENCOUNTER
Spoke with pt regarding his Procedure time changed and he Agreed with this plan. Per Columba in Endo time has to be moved to 1230 pm.    Scheduled for:  Colonoscopy 03774  Provider Name: Olvin Gunter  Date:  7/3/18  Location:  [facility]  Sedation:  Ivcs  Time:   Changed

## 2018-07-02 ENCOUNTER — TELEPHONE (OUTPATIENT)
Dept: GASTROENTEROLOGY | Facility: CLINIC | Age: 53
End: 2018-07-02

## 2018-07-02 NOTE — TELEPHONE ENCOUNTER
Pt has questions regarding rx:suprep indicates he has another generic brand he p/up and needs to know what time to start taking,will attempt to transfer, pls call at:[phone],thanks.     Current Outpatient Prescriptions:   •  PEG 3350-KCl-NaBcb-NaCl-Crystal

## 2018-07-02 NOTE — TELEPHONE ENCOUNTER
I spoke to the pt and clarified he should drink half his prep tonight between 5 and 8 pm and drink the other half 6 hours prior to his arrival time between 6:30 am and 9:30 am tomorrow morning. The pt verbalizes understanding.  He was also told he can drink

## 2018-07-02 NOTE — TELEPHONE ENCOUNTER
Pt was wondering if he can drink clear liquids between drinking his prep. I advised the pt that her may drink clear liquids up to 3 hours prior to his arrival time.  He verbalizes understanding

## 2018-07-02 NOTE — TELEPHONE ENCOUNTER
Pt had a question about Golytely bowel prep as the Suprep that was sent over was too expensive so pt is using the Golytely. I notified patient that he needs to start drinking prep tonight at 5pm and finish by 10pm tonight 7/2/18.  Procedure is 7/3/18 with

## 2018-07-03 ENCOUNTER — SURGERY (OUTPATIENT)
Age: 53
End: 2018-07-03

## 2018-07-03 ENCOUNTER — HOSPITAL ENCOUNTER (OUTPATIENT)
Facility: HOSPITAL | Age: 53
Setting detail: HOSPITAL OUTPATIENT SURGERY
Discharge: HOME | End: 2018-07-03
Attending: INTERNAL MEDICINE | Admitting: INTERNAL MEDICINE
Payer: COMMERCIAL

## 2018-07-03 VITALS
WEIGHT: 135 LBS | OXYGEN SATURATION: 98 % | RESPIRATION RATE: 13 BRPM | BODY MASS INDEX: 20.46 KG/M2 | HEART RATE: 68 BPM | SYSTOLIC BLOOD PRESSURE: 121 MMHG | DIASTOLIC BLOOD PRESSURE: 88 MMHG | HEIGHT: 68 IN

## 2018-07-03 DIAGNOSIS — Z12.11 COLON CANCER SCREENING: ICD-10-CM

## 2018-07-03 PROCEDURE — 0DBH8ZX EXCISION OF CECUM, VIA NATURAL OR ARTIFICIAL OPENING ENDOSCOPIC, DIAGNOSTIC: ICD-10-PCS | Performed by: INTERNAL MEDICINE

## 2018-07-03 PROCEDURE — 45385 COLONOSCOPY W/LESION REMOVAL: CPT | Performed by: INTERNAL MEDICINE

## 2018-07-03 PROCEDURE — G0500 MOD SEDAT ENDO SERVICE >5YRS: HCPCS | Performed by: INTERNAL MEDICINE

## 2018-07-03 RX ORDER — SODIUM CHLORIDE 0.9 % (FLUSH) 0.9 %
10 SYRINGE (ML) INJECTION AS NEEDED
Status: DISCONTINUED | OUTPATIENT
Start: 2018-07-03 | End: 2018-07-03 | Stop reason: HOSPADM

## 2018-07-03 RX ORDER — SODIUM CHLORIDE, SODIUM LACTATE, POTASSIUM CHLORIDE, CALCIUM CHLORIDE 600; 310; 30; 20 MG/100ML; MG/100ML; MG/100ML; MG/100ML
INJECTION, SOLUTION INTRAVENOUS CONTINUOUS
Status: DISCONTINUED | OUTPATIENT
Start: 2018-07-03 | End: 2018-07-03

## 2018-07-03 RX ORDER — MIDAZOLAM HYDROCHLORIDE 1 MG/ML
1 INJECTION INTRAMUSCULAR; INTRAVENOUS EVERY 5 MIN PRN
Status: DISCONTINUED | OUTPATIENT
Start: 2018-07-03 | End: 2018-07-03 | Stop reason: HOSPADM

## 2018-07-03 RX ORDER — MIDAZOLAM HYDROCHLORIDE 1 MG/ML
INJECTION INTRAMUSCULAR; INTRAVENOUS
Status: DISCONTINUED | OUTPATIENT
Start: 2018-07-03 | End: 2018-07-03 | Stop reason: HOSPADM

## 2018-07-03 NOTE — OPERATIVE REPORT
COLONOSCOPY PROCEDURE REPORT     DATE OF PROCEDURE:  7/3/2018     PCP: Daquan Cedillo MD     PREOPERATIVE DIAGNOSIS: Screening colonoscopy examination     POSTOPERATIVE DIAGNOSIS:  See impression. SURGEON:  Skinny Jose M.D.     Yaa Ashton:

## 2018-07-03 NOTE — H&P
History & Physical Examination    Patient Name: Mikey Wade  MRN: [id number]  CSN: [id number]  YOB: 1965    Diagnosis: Screening colonoscopy examination    Present Illness:     50year old man with BMI 19.6, history of coronary artery di Min PRN   fentaNYL citrate (SUBLIMAZE) 0.05 MG/ML injection 25 mcg 25 mcg Intravenous Q5 Min PRN       Allergies: No Known Allergies    Past Medical History:   Diagnosis Date   • Atherosclerosis of coronary artery June 26, 2015    Stenting of Left Circumfl

## 2018-07-09 ENCOUNTER — PATIENT MESSAGE (OUTPATIENT)
Dept: GASTROENTEROLOGY | Facility: CLINIC | Age: 53
End: 2018-07-09

## 2018-07-10 ENCOUNTER — TELEPHONE (OUTPATIENT)
Dept: GASTROENTEROLOGY | Facility: CLINIC | Age: 53
End: 2018-07-10

## 2018-07-10 NOTE — TELEPHONE ENCOUNTER
From: Darryl Roberts  To: Yasmine Bustamante MD  Sent: 7/9/2018 6:54 PM CDT  Subject: Test Results Question    Hi,  I came for the Colonoscopy on 7/3 and one benign colon polyp was found which was was sent to the lab.  Can you please send me the re

## 2018-07-11 NOTE — TELEPHONE ENCOUNTER
GI RNs–  Email regarding adenomatous colon polyp was sent out tonight. Please recall for colonoscopy examination in 3 years.

## 2018-07-17 ENCOUNTER — OFFICE VISIT (OUTPATIENT)
Dept: CARDIOLOGY CLINIC | Facility: CLINIC | Age: 53
End: 2018-07-17

## 2018-07-17 VITALS
WEIGHT: 137 LBS | HEART RATE: 68 BPM | BODY MASS INDEX: 21 KG/M2 | DIASTOLIC BLOOD PRESSURE: 70 MMHG | SYSTOLIC BLOOD PRESSURE: 110 MMHG | RESPIRATION RATE: 12 BRPM

## 2018-07-17 DIAGNOSIS — E78.5 HYPERLIPIDEMIA, UNSPECIFIED HYPERLIPIDEMIA TYPE: ICD-10-CM

## 2018-07-17 DIAGNOSIS — I25.10 ATHEROSCLEROSIS OF NATIVE CORONARY ARTERY OF NATIVE HEART WITHOUT ANGINA PECTORIS: ICD-10-CM

## 2018-07-17 DIAGNOSIS — I10 ESSENTIAL HYPERTENSION: Primary | ICD-10-CM

## 2018-07-17 PROCEDURE — 99214 OFFICE O/P EST MOD 30 MIN: CPT | Performed by: INTERNAL MEDICINE

## 2018-07-17 PROCEDURE — 99212 OFFICE O/P EST SF 10 MIN: CPT | Performed by: INTERNAL MEDICINE

## 2018-07-17 NOTE — PROGRESS NOTES
Sea Hazel is a 50year old male. Patient presents with:   Follow - Up: sTRESS MAKES CHEST HURT - Skin itchy    HPI:   This is a pleasant 27-year-old with coronary disease hypertension elevated cholesterol known prior ramus and circumflex PCI in June 2 GENERAL HEALTH: feels well otherwise  SKIN: denies any unusual skin lesions or rashes  RESPIRATORY: denies shortness of breath with exertion  CARDIOVASCULAR:See HPI  GI: denies abdominal pain and denies heartburn  NEURO: denies headaches  Remainder of re

## 2018-07-18 ENCOUNTER — NURSE TRIAGE (OUTPATIENT)
Dept: INTERNAL MEDICINE CLINIC | Facility: CLINIC | Age: 53
End: 2018-07-18

## 2018-07-18 NOTE — TELEPHONE ENCOUNTER
Spoke with patient (verified name and ), reviewed information, patient verbalized understanding and agrees with plan. Pt has purchased Allegra already but has not used it.  Since he has it on hand, advised pt to try it along with the Flonase (pt is alr

## 2018-07-18 NOTE — TELEPHONE ENCOUNTER
I would advise the patient to take combination of over-the-counter allergy medications. He should start on Zyrtec 10 mg once a day and also Flonase nasal spray 2 puffs in each nostril once a day. See if things are better in the coming days.

## 2018-07-18 NOTE — TELEPHONE ENCOUNTER
Action Requested: Summary for Provider     []  Critical Lab, Recommendations Needed  [] Need Additional Advice  []   FYI    []   Need Orders  [x] Need Medications Sent to Pharmacy  []  Other     SUMMARY: REQUESTING MED, sinus congestion, tinnitus    Pt sta

## 2018-07-18 NOTE — TELEPHONE ENCOUNTER
Pt called in stating that he is having tinnitus and congestion. Pt states he has borderline high BP and is wanting to know what can be done. Please advise.

## 2018-07-19 NOTE — TELEPHONE ENCOUNTER
Reviewed doctor's recommendations with pt, pt will use in conjunction with the Flonase nasal spray, pt had no further questions at this time.

## 2018-07-20 ENCOUNTER — TELEPHONE (OUTPATIENT)
Dept: CARDIOLOGY CLINIC | Facility: CLINIC | Age: 53
End: 2018-07-20

## 2018-07-20 NOTE — TELEPHONE ENCOUNTER
Pt states he seen dr Jeremías Jones a few days ago. Pt would like to know when is he to do the blood test? Now or later on . Please call.

## 2018-07-20 NOTE — TELEPHONE ENCOUNTER
Patient was called and advised to have Blood Test and Stress Echo on the same day. Patient verbalized understanding. No further questions / concerns at this time.

## 2018-07-23 ENCOUNTER — OFFICE VISIT (OUTPATIENT)
Dept: PODIATRY CLINIC | Facility: CLINIC | Age: 53
End: 2018-07-23
Payer: COMMERCIAL

## 2018-07-23 DIAGNOSIS — B35.1 ONYCHOMYCOSIS: Primary | ICD-10-CM

## 2018-07-23 PROCEDURE — 99203 OFFICE O/P NEW LOW 30 MIN: CPT | Performed by: PODIATRIST

## 2018-07-23 NOTE — PROGRESS NOTES
Aaron Oneil is a 50year old male. Patient presents with:  Consult: Toenail fungus left great toe -- Onset 2 years and tried tolnaftate 1 % solution and Mariola nail gel for 2 year and not helpful. Denies any pain.          HPI:   This pleasant patient pr Family History   Problem Relation Age of Onset   • Heart Disease Father      premature CAD (cause of death)   • Lipids Mother      hyperlipidemia   • Stroke Paternal Grandmother      CVA   • Diabetes Other      family h/o   • Stroke Other      family h/o CULTURE; Future        Plan:  Today using a nail nippers took a specimen of the lateral edge of his left hallux nail we will do dermatophyte fungal testing on it will call him with the results I did take time to tell him it could take several weeks    The p

## 2018-07-30 ENCOUNTER — TELEPHONE (OUTPATIENT)
Dept: CARDIOLOGY CLINIC | Facility: CLINIC | Age: 53
End: 2018-07-30

## 2018-07-30 ENCOUNTER — TELEPHONE (OUTPATIENT)
Dept: INTERNAL MEDICINE CLINIC | Facility: CLINIC | Age: 53
End: 2018-07-30

## 2018-07-30 NOTE — TELEPHONE ENCOUNTER
Pt states he has warts over entire body and itching. Pt inquiring If these symptoms are related to medication prescribed by MB.  Please call thank you [phone]

## 2018-07-30 NOTE — TELEPHONE ENCOUNTER
Carlus Cowden requesting RN to obtain prior auth prior to 8/1/2018 Stress Echo. Pls call [facility] at [phone] & Carlus Cowden with approved Amrit Sy. Thank you.

## 2018-07-30 NOTE — TELEPHONE ENCOUNTER
Patient was called. States has been experiencing a pruritic skin rash for the past 10 days which started on lower legs and it is now spreading to his abdomen and forehead. Denies any breathing issues or swallowing difficulty. Was prescribed Triamcinolone cream by Dr. Danyell Calvo on 06/25/18 with minimal improvement. In view of current clinical condition patient was advised to be seen by Dr. Danyell Calvo or his APN today for further evaluation. Cardiologist Dr. Sola Gusman is on vacation all week. Patient verbalized understanding and agreed to nursing instructions given. No further questions /concerns at this time.

## 2018-07-31 NOTE — TELEPHONE ENCOUNTER
Re: Stress Echo scheduled for 8/1/18   CARD ECHO STRESS ECHO/REST AND STRESS(CPT=93350/99535 [facility] [address]) (Order #[id number]) on 7/17/18  Associated Diagnoses     I25.10 Atherosclerosis of native coronary artery of native heart without     angina pectoris

## 2018-08-01 ENCOUNTER — HOSPITAL ENCOUNTER (OUTPATIENT)
Dept: CV DIAGNOSTICS | Facility: HOSPITAL | Age: 53
Discharge: HOME | End: 2018-08-01
Attending: INTERNAL MEDICINE
Payer: COMMERCIAL

## 2018-08-01 DIAGNOSIS — I25.10 ATHEROSCLEROSIS OF NATIVE CORONARY ARTERY OF NATIVE HEART WITHOUT ANGINA PECTORIS: ICD-10-CM

## 2018-08-01 DIAGNOSIS — E78.5 HYPERLIPIDEMIA, UNSPECIFIED HYPERLIPIDEMIA TYPE: ICD-10-CM

## 2018-08-01 DIAGNOSIS — I10 ESSENTIAL HYPERTENSION: ICD-10-CM

## 2018-08-01 PROCEDURE — 93018 CV STRESS TEST I&R ONLY: CPT | Performed by: INTERNAL MEDICINE

## 2018-08-01 PROCEDURE — 93350 STRESS TTE ONLY: CPT | Performed by: INTERNAL MEDICINE

## 2018-08-01 PROCEDURE — 93016 CV STRESS TEST SUPVJ ONLY: CPT | Performed by: INTERNAL MEDICINE

## 2018-08-01 PROCEDURE — 93017 CV STRESS TEST TRACING ONLY: CPT | Performed by: INTERNAL MEDICINE

## 2018-08-03 LAB
ABSOLUTE BASOPHILS: 30 CELLS/UL (ref 0–200)
ABSOLUTE EOSINOPHILS: 69 CELLS/UL (ref 15–500)
ABSOLUTE LYMPHOCYTES: 1075 CELLS/UL (ref 850–3900)
ABSOLUTE MONOCYTES: 258 CELLS/UL (ref 200–950)
ABSOLUTE NEUTROPHILS: 2868 CELLS/UL (ref 1500–7800)
ALT: 24 U/L (ref 9–46)
AST: 23 U/L (ref 10–35)
BASOPHILS: 0.7 %
CHOL/HDLC RATIO: 3.1 (CALC)
CHOLESTEROL, TOTAL: 122 MG/DL
EOSINOPHILS: 1.6 %
HDL CHOLESTEROL: 40 MG/DL
HEMATOCRIT: 45.2 % (ref 38.5–50)
HEMOGLOBIN: 14.9 G/DL (ref 13.2–17.1)
LDL-CHOLESTEROL: 64 MG/DL (CALC)
LYMPHOCYTES: 25 %
MCH: 29.7 PG (ref 27–33)
MCHC: 33 G/DL (ref 32–36)
MCV: 90.2 FL (ref 80–100)
MONOCYTES: 6 %
MPV: 10.5 FL (ref 7.5–12.5)
NEUTROPHILS: 66.7 %
NON-HDL CHOLESTEROL: 82 MG/DL (CALC)
PLATELET COUNT: 181 THOUSAND/UL (ref 140–400)
RDW: 12.4 % (ref 11–15)
RED BLOOD CELL COUNT: 5.01 MILLION/UL (ref 4.2–5.8)
TRIGLYCERIDES: 97 MG/DL
WHITE BLOOD CELL COUNT: 4.3 THOUSAND/UL (ref 3.8–10.8)

## 2018-08-04 ENCOUNTER — TELEPHONE (OUTPATIENT)
Dept: OTHER | Age: 53
End: 2018-08-04

## 2018-08-06 ENCOUNTER — TELEPHONE (OUTPATIENT)
Dept: OTHER | Age: 53
End: 2018-08-06

## 2018-08-06 ENCOUNTER — OFFICE VISIT (OUTPATIENT)
Dept: INTERNAL MEDICINE CLINIC | Facility: CLINIC | Age: 53
End: 2018-08-06
Payer: COMMERCIAL

## 2018-08-06 VITALS
TEMPERATURE: 98 F | RESPIRATION RATE: 20 BRPM | SYSTOLIC BLOOD PRESSURE: 130 MMHG | HEART RATE: 64 BPM | BODY MASS INDEX: 19.38 KG/M2 | WEIGHT: 135.38 LBS | HEIGHT: 70 IN | DIASTOLIC BLOOD PRESSURE: 82 MMHG

## 2018-08-06 DIAGNOSIS — R21 RASH: Primary | ICD-10-CM

## 2018-08-06 PROCEDURE — 99212 OFFICE O/P EST SF 10 MIN: CPT | Performed by: INTERNAL MEDICINE

## 2018-08-06 PROCEDURE — 99213 OFFICE O/P EST LOW 20 MIN: CPT | Performed by: INTERNAL MEDICINE

## 2018-08-06 RX ORDER — METHYLPREDNISOLONE 4 MG/1
TABLET ORAL
Qty: 1 KIT | Refills: 0 | Status: SHIPPED | OUTPATIENT
Start: 2018-08-06 | End: 2018-10-06

## 2018-08-06 RX ORDER — PERMETHRIN 50 MG/G
CREAM TOPICAL
Qty: 1 TUBE | Refills: 1 | Status: SHIPPED | OUTPATIENT
Start: 2018-08-06 | End: 2018-10-06

## 2018-08-06 NOTE — TELEPHONE ENCOUNTER
Pt stated he received rx asking if can put on face -advised instructions head to toe but to avoid the eyes

## 2018-08-08 NOTE — PROGRESS NOTES
HPI:    Patient ID: Darryl Roberts is a 50year old male. HPI  Patient is here with concerns about skin lesions and rash. Has had some itchiness on his forehead for about 5 days.   Has had some itchiness and lesions on his right thigh for about a week Never Used                      Alcohol use: No                     Review of Systems         Current Outpatient Prescriptions:  permethrin (ELIMITE) 5 % External Cream Apply head to toe. Wash off 8-14 hours later.  Disp: 1 Tube Rfl: 1   methylPREDNISolone

## 2018-08-14 ENCOUNTER — TELEPHONE (OUTPATIENT)
Dept: OTHER | Age: 53
End: 2018-08-14

## 2018-08-14 NOTE — TELEPHONE ENCOUNTER
Please give patient the name and number of the dermatology doctors here at the [facility] so he can be evaluated. Phone number [phone].   Can see Dr. Jaylon Sutton or Dr. Arvella Cranker

## 2018-08-14 NOTE — TELEPHONE ENCOUNTER
Dr. Hung Ruiz: Please advise, patient seeking further advise for itching. Patient LOV 8/6/18 and done with medrol dante treatment for itching/rash. He did not use permethrin cream. He didn't feel comfortable using it. He just used medrol dante.  He states Ra

## 2018-08-15 NOTE — TELEPHONE ENCOUNTER
Pt called, message per Dr given.   Verbalized understanding and compliance  Pt requested infor be sent to him via [employer] as well

## 2018-08-17 RX ORDER — ATORVASTATIN CALCIUM 20 MG/1
TABLET, FILM COATED ORAL
Qty: 90 TABLET | Refills: 0 | Status: SHIPPED | OUTPATIENT
Start: 2018-08-17 | End: 2018-11-05

## 2018-08-17 NOTE — TELEPHONE ENCOUNTER
Refilled per protocol.   Cholesterol Medications  Protocol Criteria:  · Appointment scheduled in the past 12 months or in the next 3 months  · ALT & LDL on file in the past 12 months  · ALT result < 80  · LDL result <130   Recent Outpatient Visits

## 2018-08-23 ENCOUNTER — TELEPHONE (OUTPATIENT)
Dept: INTERNAL MEDICINE CLINIC | Facility: CLINIC | Age: 53
End: 2018-08-23

## 2018-08-23 NOTE — TELEPHONE ENCOUNTER
Spoke with [facility]--verifies they received 8/17/18 Rx and to disregard today's request. No further questions/concerns at this time.

## 2018-08-24 ENCOUNTER — TELEPHONE (OUTPATIENT)
Dept: PODIATRY CLINIC | Facility: CLINIC | Age: 53
End: 2018-08-24

## 2018-08-24 ENCOUNTER — TELEPHONE (OUTPATIENT)
Dept: OTHER | Age: 53
End: 2018-08-24

## 2018-08-24 NOTE — TELEPHONE ENCOUNTER
Patient calling stating he is scheduled to see a dermatologist today for a kenalog injection for his eczema. Patient wants to know if Dr. Alexander Snow thinks it is safe for him to get a Kenalog injection.      Patient scheduled with dermatology today at 12:15

## 2018-08-27 NOTE — TELEPHONE ENCOUNTER
pt called. Appt made for 9/17/18 with [facility]. pt is asking for results before his appt. Please call or send message through [address] Thank you.

## 2018-08-31 NOTE — TELEPHONE ENCOUNTER
Tried to release test results to [id number] but was unable to do so. I will mail results to pt's home.

## 2018-09-11 ENCOUNTER — NURSE TRIAGE (OUTPATIENT)
Dept: OTHER | Age: 53
End: 2018-09-11

## 2018-09-11 ENCOUNTER — HOSPITAL ENCOUNTER (EMERGENCY)
Facility: HOSPITAL | Age: 53
Discharge: HOME | End: 2018-09-11
Attending: EMERGENCY MEDICINE
Payer: COMMERCIAL

## 2018-09-11 VITALS
HEART RATE: 68 BPM | TEMPERATURE: 98 F | SYSTOLIC BLOOD PRESSURE: 122 MMHG | HEIGHT: 70 IN | BODY MASS INDEX: 22.62 KG/M2 | OXYGEN SATURATION: 97 % | WEIGHT: 158 LBS | RESPIRATION RATE: 18 BRPM | DIASTOLIC BLOOD PRESSURE: 82 MMHG

## 2018-09-11 DIAGNOSIS — S01.502A TONGUE WOUND, INITIAL ENCOUNTER: Primary | ICD-10-CM

## 2018-09-11 PROCEDURE — 12011 RPR F/E/E/N/L/M 2.5 CM/<: CPT

## 2018-09-11 PROCEDURE — 99283 EMERGENCY DEPT VISIT LOW MDM: CPT

## 2018-09-11 NOTE — TELEPHONE ENCOUNTER
Pt states his tongue started to bleed earlier and \"it was gobs\"  Pt pt pressure on site and bleeding stopped. Pt states he is on plavix. Pt states wound is small,not gaping and stopped after direct pressure.     Advised per protocol  f bleeding occurs

## 2018-09-12 NOTE — ED INITIAL ASSESSMENT (HPI)
Pt has a small area in the middle of tongue that is bleeding. Denies any injury. Taking plavix. Small amount of bleeding noted now.

## 2018-09-12 NOTE — ED PROVIDER NOTES
Patient Seen in: [facility] Emergency Department    History   Patient presents with:  Bleeding      HPI    The patient presents to the ED with intermittent bleeding from his tongue for the past several hours. Denies known injury, takes Plavix.   H needs - non-medical: Not on file    Occupational History      Not on file    Tobacco Use      Smoking status: Never Smoker      Smokeless tobacco: Never Used    Substance and Sexual Activity      Alcohol use: No      Drug use: No      Sexual activity: Yes and vitals reviewed.       ED Course      Labs Reviewed - No data to display      Imaging Results Available and Reviewed while in ED:     ED Medications Administered: Medications - No data to display      [facility]      09/11/18  2157 09/11/18  2243   BP: 128/87 (primary encounter diagnosis)    Disposition:  Discharge    Follow-up:  Soumya Strange MD  [address]  [phone]    Schedule an appointment as soon as possible for a visit in 3 days        Medications Prescribed:  [address]

## 2018-10-06 ENCOUNTER — OFFICE VISIT (OUTPATIENT)
Dept: PODIATRY CLINIC | Facility: CLINIC | Age: 53
End: 2018-10-06
Payer: COMMERCIAL

## 2018-10-06 ENCOUNTER — TELEPHONE (OUTPATIENT)
Dept: PODIATRY CLINIC | Facility: CLINIC | Age: 53
End: 2018-10-06

## 2018-10-06 DIAGNOSIS — B35.1 ONYCHOMYCOSIS: Primary | ICD-10-CM

## 2018-10-06 PROCEDURE — 99213 OFFICE O/P EST LOW 20 MIN: CPT | Performed by: PODIATRIST

## 2018-10-06 RX ORDER — MULTIVITAMIN
1 TABLET ORAL
COMMUNITY

## 2018-10-06 RX ORDER — ASPIRIN 81 MG/1
81 TABLET, CHEWABLE ORAL
COMMUNITY
End: 2018-10-06

## 2018-10-06 NOTE — TELEPHONE ENCOUNTER
Prior Authorization Required  •  Efinaconazole (JUBLIA) 10 % External Solution, Apply one thin coat to affected toenails once daily, Disp: 8 mL, Rfl: 8    Phone number [phone]  Cardholder ID: [id number]

## 2018-10-08 NOTE — PROGRESS NOTES
Mikey Wade is a 48year old male.  Patient presents with:  Toenail Care: f/u left great toe fungus pain 0/10        HPI:   Patient returns to the clinic not have any pain in his left great toe still concerned over the fungus in his toenail he has trie ENDOSCOPY   • INTESTINE SURG PROCEDURE UNLISTED  1965    per NG: intestinal surgery      Family History   Problem Relation Age of Onset   • Heart Disease Father         premature CAD (cause of death)   • Lipids Mother         hyperlipidemia   • Stroke [place] denies headaches    EXAM:   There were no vitals taken for this visit. Physical Exam  GENERAL: well developed, well nourished, in no apparent distress  EXTREMITIES:   The left hallux nail is growing out penicillium species.   It has remained unchanged stil

## 2018-10-10 NOTE — TELEPHONE ENCOUNTER
LM on pt's preferred # that Rx ordered by Joshua was changed to another topical Rx for the same problem that is covered by insurance. Informed pt that this was sent to his [place] in [facility].  Advised pt to call back if he would like more detai

## 2018-11-02 ENCOUNTER — TELEPHONE (OUTPATIENT)
Dept: OTHER | Age: 53
End: 2018-11-02

## 2018-11-02 NOTE — TELEPHONE ENCOUNTER
Pt is asking if doctor would give him an order for cardiac rehab. States he had cardiac rehab about three years ago following surgery for a blocked artery. Pt states he contacted the rehab dept and was advised he would need a note from the doctor.  Advised

## 2018-11-02 NOTE — TELEPHONE ENCOUNTER
Please call the patient. Cardiac rehab is typically done immediately following a cardiac event or surgery. It is been years since his cardiac issue. I do not feel comfortable giving him a order for cardiac rehab.   Perhaps the patient can contact his car

## 2018-11-05 RX ORDER — ATORVASTATIN CALCIUM 20 MG/1
TABLET, FILM COATED ORAL
Qty: 30 TABLET | Refills: 2 | Status: SHIPPED | OUTPATIENT
Start: 2018-11-05 | End: 2018-12-10

## 2018-11-07 ENCOUNTER — PATIENT MESSAGE (OUTPATIENT)
Dept: CARDIOLOGY CLINIC | Facility: CLINIC | Age: 53
End: 2018-11-07

## 2018-11-07 NOTE — TELEPHONE ENCOUNTER
From: Robina Ugalde  To:  She Flores MD  Sent: 11/7/2018 6:35 AM CST  Subject: Non-Urgent Medical Question    Goodmorxin,  I would like to do some sessions of fitness at the [place] [address] center at the [facility] at [facility], however

## 2018-11-07 NOTE — TELEPHONE ENCOUNTER
Agata, patient asking for referral to cardiac rehab, not sure if he qualifies. LOV MB 7/17/18, Stress echo 8/1/18. Please advise.

## 2018-11-09 NOTE — TELEPHONE ENCOUNTER
Without recent cardiac intervention pt does not qualify for phase 2 cardiac rehab, he can however do phase 3 which I don't believe he needs an order. He will have to pay out of pocket for that.

## 2018-11-12 ENCOUNTER — PATIENT MESSAGE (OUTPATIENT)
Dept: CARDIOLOGY CLINIC | Facility: CLINIC | Age: 53
End: 2018-11-12

## 2018-11-12 DIAGNOSIS — I25.119 CORONARY ARTERY DISEASE INVOLVING NATIVE HEART WITH ANGINA PECTORIS, UNSPECIFIED VESSEL OR LESION TYPE (HCC): Primary | ICD-10-CM

## 2018-11-12 NOTE — TELEPHONE ENCOUNTER
From: Natalia Avelar  To:  Pablo Downing MD  Sent: 11/12/2018 11:02 AM CST  Subject: Non-Urgent Medical Question    Chad,  I contacted the cardiac Rehabilitation in [place] at [facility] and they told me that I need a note from the docto

## 2018-11-13 NOTE — TELEPHONE ENCOUNTER
Discussed directly with Dr. Iliana Marcelo. Per Dr. Iliana Marcelo ok for pt to go to cardiac rehab as he requested. [employer] message sent to pt.

## 2018-12-02 DIAGNOSIS — I10 ESSENTIAL HYPERTENSION: ICD-10-CM

## 2018-12-03 RX ORDER — CLOPIDOGREL BISULFATE 75 MG/1
TABLET ORAL
Qty: 30 TABLET | Refills: 5 | Status: SHIPPED | OUTPATIENT
Start: 2018-12-03 | End: 2018-12-10

## 2018-12-03 RX ORDER — LISINOPRIL 10 MG/1
TABLET ORAL
Qty: 30 TABLET | Refills: 2 | Status: SHIPPED | OUTPATIENT
Start: 2018-12-03 | End: 2018-12-10

## 2018-12-03 NOTE — TELEPHONE ENCOUNTER
Hypertensive Medications  Protocol Criteria:  · Appointment scheduled in the past 6 months or in the next 3 months  · BMP or CMP in the past 12 months  · Creatinine result < 2  Recent Outpatient Visits            1 month ago Onychomycosis    [facility]

## 2018-12-05 ENCOUNTER — OFFICE VISIT (OUTPATIENT)
Dept: OPHTHALMOLOGY | Facility: CLINIC | Age: 53
End: 2018-12-05
Payer: COMMERCIAL

## 2018-12-05 DIAGNOSIS — H52.4 HYPEROPIA WITH PRESBYOPIA OF BOTH EYES: ICD-10-CM

## 2018-12-05 DIAGNOSIS — H02.883 MEIBOMIAN GLAND DYSFUNCTION (MGD) OF BOTH EYES: ICD-10-CM

## 2018-12-05 DIAGNOSIS — H53.10 SUBJECTIVE VISUAL DISTURBANCE OF BOTH EYES: Primary | ICD-10-CM

## 2018-12-05 DIAGNOSIS — H02.886 MEIBOMIAN GLAND DYSFUNCTION (MGD) OF BOTH EYES: ICD-10-CM

## 2018-12-05 DIAGNOSIS — H52.03 HYPEROPIA WITH PRESBYOPIA OF BOTH EYES: ICD-10-CM

## 2018-12-05 PROCEDURE — 99204 OFFICE O/P NEW MOD 45 MIN: CPT | Performed by: OPHTHALMOLOGY

## 2018-12-05 PROCEDURE — 92004 COMPRE OPH EXAM NEW PT 1/>: CPT | Performed by: OPHTHALMOLOGY

## 2018-12-05 NOTE — PROGRESS NOTES
Sherice Salazar is a 48year old male. HPI:     HPI     Patient is here for a complete exam.  Patient complains of blurry vision without his glasses. He states his vision is good with his glasses.    Patient saw Dr. Rosario Hernandez 6 months ago who told him he had TAKE ONE TABLET BY MOUTH EVERY DAY Disp: 30 tablet Rfl: 5   LISINOPRIL 10 MG Oral Tab TAKE ONE TABLET BY MOUTH EVERY DAY Disp: 30 tablet Rfl: 2   ATORVASTATIN 20 MG Oral Tab TAKE 1 TABLET BY MOUTH NIGHTLY Disp: 30 tablet Rfl: 2   Ciclopirox 8 % External So Sphere  +2.00    Left +0.75 Sphere  +2.00    Type:  Progressive bifocal          Wearing Rx #2       Sphere Cylinder Axis Add    Right +1.75 Sphere      Left +1.25 +0.50 020     Type:  old-Computer single vision          Manifest Refraction    Patient is h

## 2018-12-05 NOTE — PATIENT INSTRUCTIONS
Hyperopia with presbyopia of both eyes  Discussed with patient to continue with glasses for best corrected visual acuity. Subjective visual disturbance of both eyes  Recommend continue with glasses for best corrected visual acuity.   Reassured patient t

## 2018-12-05 NOTE — ASSESSMENT & PLAN NOTE
Recommend continue with glasses for best corrected visual acuity. Reassured patient that eyes look very healthy and normal; there is no evidence of cataract, glaucoma or macular degeneration in either eye.

## 2018-12-10 ENCOUNTER — OFFICE VISIT (OUTPATIENT)
Dept: INTERNAL MEDICINE CLINIC | Facility: CLINIC | Age: 53
End: 2018-12-10
Payer: COMMERCIAL

## 2018-12-10 ENCOUNTER — TELEPHONE (OUTPATIENT)
Dept: INTERNAL MEDICINE CLINIC | Facility: CLINIC | Age: 53
End: 2018-12-10

## 2018-12-10 VITALS
WEIGHT: 141.31 LBS | RESPIRATION RATE: 18 BRPM | BODY MASS INDEX: 20.23 KG/M2 | DIASTOLIC BLOOD PRESSURE: 78 MMHG | SYSTOLIC BLOOD PRESSURE: 124 MMHG | HEIGHT: 70 IN | HEART RATE: 74 BPM | TEMPERATURE: 98 F

## 2018-12-10 DIAGNOSIS — E78.5 HYPERLIPIDEMIA, UNSPECIFIED HYPERLIPIDEMIA TYPE: ICD-10-CM

## 2018-12-10 DIAGNOSIS — Z00.00 ROUTINE PHYSICAL EXAMINATION: Primary | ICD-10-CM

## 2018-12-10 DIAGNOSIS — I25.10 CORONARY ARTERY DISEASE INVOLVING NATIVE CORONARY ARTERY OF NATIVE HEART WITHOUT ANGINA PECTORIS: ICD-10-CM

## 2018-12-10 DIAGNOSIS — L30.9 DERMATITIS: ICD-10-CM

## 2018-12-10 DIAGNOSIS — I10 ESSENTIAL HYPERTENSION: ICD-10-CM

## 2018-12-10 PROCEDURE — 99396 PREV VISIT EST AGE 40-64: CPT | Performed by: INTERNAL MEDICINE

## 2018-12-10 RX ORDER — HALOBETASOL PROPIONATE 0.05 %
OINTMENT (GRAM) TOPICAL
COMMUNITY
Start: 2018-11-26 | End: 2018-12-10 | Stop reason: ALTCHOICE

## 2018-12-10 RX ORDER — FLUTICASONE PROPIONATE 50 MCG
SPRAY, SUSPENSION (ML) NASAL
COMMUNITY
Start: 2016-05-20 | End: 2019-01-05

## 2018-12-10 RX ORDER — CLOPIDOGREL BISULFATE 75 MG/1
75 TABLET ORAL
Qty: 30 TABLET | Refills: 5 | Status: SHIPPED | OUTPATIENT
Start: 2018-12-10 | End: 2019-12-06

## 2018-12-10 RX ORDER — LISINOPRIL 10 MG/1
10 TABLET ORAL
Qty: 30 TABLET | Refills: 5 | Status: SHIPPED | OUTPATIENT
Start: 2018-12-10 | End: 2019-09-03

## 2018-12-10 RX ORDER — ATORVASTATIN CALCIUM 20 MG/1
TABLET, FILM COATED ORAL
COMMUNITY
Start: 2016-05-16 | End: 2018-12-10

## 2018-12-10 RX ORDER — ATORVASTATIN CALCIUM 20 MG/1
TABLET, FILM COATED ORAL
Qty: 30 TABLET | Refills: 5 | Status: SHIPPED | OUTPATIENT
Start: 2018-12-10 | End: 2019-12-06 | Stop reason: SDUPTHER

## 2018-12-10 RX ORDER — LISINOPRIL 10 MG/1
TABLET ORAL
COMMUNITY
Start: 2016-04-20 | End: 2018-12-10

## 2018-12-10 NOTE — PROGRESS NOTES
HPI:    Patient ID: Carlos Marsh is a 48year old male. HPI  Patient is here requesting a physical exam and follow-up on chronic medical issues as listed below. Last seen here in August.  Had a rash at that time.   It is still somewhat present with lipid rx.    • CARDIOVASCULAR PROCEDURE UNLISTED  2010    normal stress echo   • CATH DRUG ELUTING STENT     • COLONOSCOPY N/A 7/3/2018    Performed by Domonique Waterman MD at [address]    per NG: inte Vitamin Oral Tab Take 1 tablet by mouth. Disp:  Rfl:    omega-3 fatty acids (FISH OIL) 1000 MG Oral Cap Take 1,000 mg by mouth daily. Disp: 90 capsule Rfl: 3   aspirin (ASPIRIN ADULT LOW STRENGTH) 81 MG Oral Tab EC Take 81 mg by mouth daily.  Disp:  Rfl: below as well as the benign cyst on the lower back. Health maintenance issues reviewed. We will check fasting blood work and contact patient with results. Encouraged to work on diet and exercise. He is noting some decrease in concentration recently.   Lalo Rodriguez

## 2018-12-10 NOTE — TELEPHONE ENCOUNTER
Pt is requesting the labs to be sent to [employer]. [address] [facility], [facility], [facility] [address]. Requesting a call back so he knows when to go to the lab.

## 2019-01-05 ENCOUNTER — PATIENT MESSAGE (OUTPATIENT)
Dept: DERMATOLOGY CLINIC | Facility: CLINIC | Age: 54
End: 2019-01-05

## 2019-01-05 ENCOUNTER — TELEPHONE (OUTPATIENT)
Dept: INTERNAL MEDICINE CLINIC | Facility: CLINIC | Age: 54
End: 2019-01-05

## 2019-01-05 ENCOUNTER — OFFICE VISIT (OUTPATIENT)
Dept: DERMATOLOGY CLINIC | Facility: CLINIC | Age: 54
End: 2019-01-05
Payer: COMMERCIAL

## 2019-01-05 DIAGNOSIS — L29.9 PRURITIC DISORDER: ICD-10-CM

## 2019-01-05 DIAGNOSIS — L81.0 POST-INFLAMMATORY HYPERPIGMENTATION: Primary | ICD-10-CM

## 2019-01-05 DIAGNOSIS — L30.0 NUMMULAR ECZEMA: ICD-10-CM

## 2019-01-05 PROCEDURE — 99203 OFFICE O/P NEW LOW 30 MIN: CPT | Performed by: DERMATOLOGY

## 2019-01-05 PROCEDURE — 99212 OFFICE O/P EST SF 10 MIN: CPT | Performed by: DERMATOLOGY

## 2019-01-05 RX ORDER — LEVOCETIRIZINE DIHYDROCHLORIDE 5 MG/1
5 TABLET, FILM COATED ORAL DAILY
Qty: 30 TABLET | Refills: 3 | Status: SHIPPED | OUTPATIENT
Start: 2019-01-05 | End: 2019-02-16

## 2019-01-05 NOTE — TELEPHONE ENCOUNTER
Pt is calling inquiring about recent lab test. Pt stts that some of the results have been released but not all.  Please advise

## 2019-01-05 NOTE — PROGRESS NOTES
HPI:     Chief Complaint     Derm Problem        HPI     Derm Problem      Additional comments: NEW PT here for treatment of eczema that was diagnosed in August by another derm, was given Halobetasol cream with no relief also had a punch Bx done and result negative for weight loss, negative for lymph malaise, negative for night sweats-, negative for fever  Integumentary-positive for pruritus, positive for rash, positive for pigment change  GI-negative for diarrhea  HEENT  Cardio/Pulmonary  Neurological-negat aggressive lipid rx.    • CARDIOVASCULAR PROCEDURE UNLISTED  2010    normal stress echo   • CATH DRUG ELUTING STENT     • COLONOSCOPY N/A 7/3/2018    Performed by Jennifer Beltran MD at [address] [facility]    p family h/o CVA   • Stroke Cousin         CVA   • Glaucoma Neg    • Macular degeneration Neg        PHYSICAL EXAM:   Patient is alert and oriented and appears their stated age. They are well-nourished.   Exam performed of scalp, face neck chest back abdom areas.  In light of the fact the patient has had 3 discrete areas biopsied and none showed atypical lymphocytes, I do not think additional biopsies are necessary at this time. I would like to reassess in about 6 weeks.     No orders of the defined types we

## 2019-01-07 NOTE — TELEPHONE ENCOUNTER
Please let patient know that her loss of a few weeks duration is likely not related to his eczema. Of note he recently had a normal thyroid function, normal CBC, so it is less likely to be from an internal problem.   His hair thinning might be due to incre

## 2019-01-13 LAB
ALBUMIN/GLOBULIN RATIO: 1.8 (CALC) (ref 1–2.5)
ALBUMIN: 4.3 G/DL (ref 3.6–5.1)
ALKALINE PHOSPHATASE: 58 U/L (ref 40–115)
ALT: 17 U/L (ref 9–46)
AST: 17 U/L (ref 10–35)
BILIRUBIN, TOTAL: 0.6 MG/DL (ref 0.2–1.2)
BUN: 24 MG/DL (ref 7–25)
CALCIUM: 9.1 MG/DL (ref 8.6–10.3)
CARBON DIOXIDE: 29 MMOL/L (ref 20–32)
CHLORIDE: 105 MMOL/L (ref 98–110)
CREATININE: 0.91 MG/DL (ref 0.7–1.33)
EGFR IF AFRICN AM: 113 ML/MIN/1.73M2
EGFR IF NONAFRICN AM: 97 ML/MIN/1.73M2
GLOBULIN: 2.4 G/DL (CALC) (ref 1.9–3.7)
GLUCOSE: 100 MG/DL (ref 65–99)
POTASSIUM: 4.2 MMOL/L (ref 3.5–5.3)
PROTEIN, TOTAL: 6.7 G/DL (ref 6.1–8.1)
SODIUM: 138 MMOL/L (ref 135–146)

## 2019-01-23 ENCOUNTER — PATIENT MESSAGE (OUTPATIENT)
Dept: OPHTHALMOLOGY | Facility: CLINIC | Age: 54
End: 2019-01-23

## 2019-01-23 ENCOUNTER — PATIENT MESSAGE (OUTPATIENT)
Dept: CARDIOLOGY CLINIC | Facility: CLINIC | Age: 54
End: 2019-01-23

## 2019-01-24 NOTE — TELEPHONE ENCOUNTER
From: Tam Treviño  To: Raul Houston MD  Sent: 1/23/2019 9:43 PM CST  Subject: Other    Hi,  I went to see Dr Geetha Ch a few weeks ago for an eye check .  It seem that the nurse did not code the billing properly since I received an invoice i

## 2019-01-25 ENCOUNTER — PATIENT MESSAGE (OUTPATIENT)
Dept: INTERNAL MEDICINE CLINIC | Facility: CLINIC | Age: 54
End: 2019-01-25

## 2019-01-25 NOTE — TELEPHONE ENCOUNTER
Spoke with Jessee Berumen in billing and she says she will reach out to patient regarding this problem.

## 2019-01-25 NOTE — TELEPHONE ENCOUNTER
From: Cherelle Harris  To: Bertrand Schaefer MD  Sent: 1/25/2019 12:56 PM CST  Subject: Non-Urgent Medical Question      Good morning,  I would like to see a dietitian . Any recommendations?     Sincerely   Neha Scriver Peeroo

## 2019-02-02 ENCOUNTER — OFFICE VISIT (OUTPATIENT)
Dept: PODIATRY CLINIC | Facility: CLINIC | Age: 54
End: 2019-02-02
Payer: COMMERCIAL

## 2019-02-02 DIAGNOSIS — B35.1 ONYCHOMYCOSIS: Primary | ICD-10-CM

## 2019-02-02 PROCEDURE — 99213 OFFICE O/P EST LOW 20 MIN: CPT | Performed by: PODIATRIST

## 2019-02-02 NOTE — PROGRESS NOTES
Olga Bhakta is a 48year old male. Patient presents with:  Toenail Fungus: Left hallux -- Using Jublia to toenail. Base of nail is more clear. Denies any pain.          HPI:   Patient returns to the clinic at this point in time for checkup on his [place] cardiac calcium scan - score 159; cardiology consult; aggressive lipid rx.    • CARDIOVASCULAR PROCEDURE UNLISTED  2010    normal stress echo   • CATH DRUG ELUTING STENT     • COLONOSCOPY N/A 7/3/2018    Performed by Domonique Waterman MD at [facility] nail  ASSESSMENT AND PLAN:   Diagnoses and all orders for this visit:    Onychomycosis        Plan: At today's office visit the patient said that he had called and they told him to stop using the Lamisil cream evidently he was using both.   I told him the L

## 2019-02-16 ENCOUNTER — OFFICE VISIT (OUTPATIENT)
Dept: DERMATOLOGY CLINIC | Facility: CLINIC | Age: 54
End: 2019-02-16
Payer: COMMERCIAL

## 2019-02-16 DIAGNOSIS — L30.0 NUMMULAR ECZEMA: ICD-10-CM

## 2019-02-16 DIAGNOSIS — L81.0 POST-INFLAMMATORY HYPERPIGMENTATION: Primary | ICD-10-CM

## 2019-02-16 DIAGNOSIS — L64.9 MALE PATTERN ALOPECIA: ICD-10-CM

## 2019-02-16 PROCEDURE — 99212 OFFICE O/P EST SF 10 MIN: CPT | Performed by: DERMATOLOGY

## 2019-02-16 PROCEDURE — 99213 OFFICE O/P EST LOW 20 MIN: CPT | Performed by: DERMATOLOGY

## 2019-02-16 NOTE — PROGRESS NOTES
HPI:     Chief Complaint     Derm Problem        HPI     Derm Problem      Additional comments: LOV 01/05/19 pt seen for eczema pt states that things have gotten so much better since last visit still has some itching on his legs but nothing like it was bef directions on insert Disp: 6 mL Rfl: 7   Multiple Vitamin Oral Tab Take 1 tablet by mouth. Disp:  Rfl:    omega-3 fatty acids (FISH OIL) 1000 MG Oral Cap Take 1,000 mg by mouth daily.  Disp: 90 capsule Rfl: 3   aspirin (ASPIRIN ADULT LOW STRENGTH) 81 MG Ora Stress: Not on file    Relationships      Social connections:        Talks on phone: Not on file        Gets together: Not on file        Attends [employer] service: Not on file        Active member of club or organization: Not on file        Attends meetin bit of temporal recession.   4.  There is no erythema scaling papules or pustules and scalp      ASSESSMENT/PLAN:   Post-inflammatory hyperpigmentation  (primary encounter diagnosis) improving–patient understands it may take 2-years or longer to fully resol

## 2019-02-16 NOTE — PATIENT INSTRUCTIONS
Try the minoxidil 5% foam instead of the liquid which should be less likely to cause irritation of the scalp  Continue lots of moisturizer with CeraVe or Cetaphil after showering. Use the triamcinolone only if any active dry or itchy spots.

## 2019-02-18 ENCOUNTER — PATIENT MESSAGE (OUTPATIENT)
Dept: DERMATOLOGY CLINIC | Facility: CLINIC | Age: 54
End: 2019-02-18

## 2019-02-18 NOTE — TELEPHONE ENCOUNTER
Okay to write a letter saying the patient was diagnosed with male pattern alopecia and we did recommend use of 5% minoxidil/Rogaine to help stabilize the condition and to stimulate hair regrowth.

## 2019-02-18 NOTE — TELEPHONE ENCOUNTER
LOV 2/16/19, pt diagnosed with male pattern alopecia - ok to write a letter?  Anything in particular you'd like me to add?

## 2019-03-06 ENCOUNTER — NURSE TRIAGE (OUTPATIENT)
Dept: OTHER | Age: 54
End: 2019-03-06

## 2019-03-06 NOTE — TELEPHONE ENCOUNTER
Regarding: Non-Urgent Medical Question  Contact: [phone]  ----- Message from [address] sent at 3/6/2019  7:56 AM CST -----    Good morning,   Can I come to the office to get the earwax removed since my ears us ringing a lot    Sincerely   Gwendolyn Hernandez

## 2019-03-07 NOTE — TELEPHONE ENCOUNTER
LMTCB transfer to triage    Leo Artis RN   to Wanda Shannonlaura LEI           3/6/19 1:56 PM   Hi Jerald Campbell,     Can you please call Dr Conor Aguilar office to discuss your symptoms further.      [phone]      Last read by Eileen Ortiz at 1:58 PM on 3

## 2019-03-07 NOTE — TELEPHONE ENCOUNTER
Action Requested: Summary for Provider     []  Critical Lab, Recommendations Needed  [] Need Additional Advice  []   FYI    []   Need Orders  [] Need Medications Sent to Pharmacy  []  Other     SUMMARY: Spoke with patient who reports his wife informed him

## 2019-05-06 ENCOUNTER — NURSE TRIAGE (OUTPATIENT)
Dept: OTHER | Age: 54
End: 2019-05-06

## 2019-05-06 NOTE — TELEPHONE ENCOUNTER
Action Requested: Summary for Provider     []  Critical Lab, Recommendations Needed  [] Need Additional Advice  []   FYI    []   Need Orders  [] Need Medications Sent to Pharmacy  []  Other     SUMMARY: appt Dr. Keila Prince.    Reason for call: Marcia (

## 2019-05-06 NOTE — TELEPHONE ENCOUNTER
Patient was left a message to call back. Transfer to [id number]      ----- Message from Via Doroteo Gonzalez. Marianooo sent at 5/5/2019  8:49 PM CDT -----  Regarding: Non-Urgent Medical Question  Contact: [phone]  Good morning,  I having lot  gas and back pain.   Do I n

## 2019-05-08 ENCOUNTER — OFFICE VISIT (OUTPATIENT)
Dept: INTERNAL MEDICINE CLINIC | Facility: CLINIC | Age: 54
End: 2019-05-08
Payer: COMMERCIAL

## 2019-05-08 VITALS
BODY MASS INDEX: 20.36 KG/M2 | DIASTOLIC BLOOD PRESSURE: 88 MMHG | SYSTOLIC BLOOD PRESSURE: 137 MMHG | HEART RATE: 70 BPM | WEIGHT: 142.19 LBS | HEIGHT: 70 IN

## 2019-05-08 DIAGNOSIS — M54.50 MIDLINE LOW BACK PAIN, UNSPECIFIED CHRONICITY, UNSPECIFIED WHETHER SCIATICA PRESENT: ICD-10-CM

## 2019-05-08 DIAGNOSIS — K21.9 GASTROESOPHAGEAL REFLUX DISEASE, ESOPHAGITIS PRESENCE NOT SPECIFIED: Primary | ICD-10-CM

## 2019-05-08 PROCEDURE — 99213 OFFICE O/P EST LOW 20 MIN: CPT | Performed by: INTERNAL MEDICINE

## 2019-05-08 PROCEDURE — 99212 OFFICE O/P EST SF 10 MIN: CPT | Performed by: INTERNAL MEDICINE

## 2019-05-08 RX ORDER — PANTOPRAZOLE SODIUM 40 MG/1
40 TABLET, DELAYED RELEASE ORAL
Qty: 30 TABLET | Refills: 0 | Status: SHIPPED | OUTPATIENT
Start: 2019-05-08 | End: 2019-07-16 | Stop reason: ALTCHOICE

## 2019-05-08 NOTE — PROGRESS NOTES
Eileen Ortiz is a 48year old male. Patient presents with:  Gas: very painful       HPI:   Pt comes as an urgent visit  C/c gas like pain   C/o gas pain x 4 days -- ?  Stress related -- doing a job he doesn't like -- doing scanning   Also has kids 7 an • Hyperlipidemia     currently on medication(atorvastatin)      Past Surgical History:   Procedure Laterality Date   • Angioplasty (coronary)     • Cardiac calcium score  2012    cardiac calcium scan - score 159; cardiology consult; aggressive lipid rx. year-does not appear to be cardiac, will try pantoprazole but if not better than will reevaluate may need EGD  All pains currently resolved  Midline low back pain, unspecified chronicity, unspecified whether sciatica present   X-ray, advised proper posture

## 2019-05-08 NOTE — PATIENT INSTRUCTIONS
General Neck and Back Pain    Both neck and back pain are usually caused by injury to the muscles or ligaments of the spine. Sometimes the disks that separate each bone of the spine may cause pain by pressing on a nearby nerve.  Back and neck pain may jordin · Poor conditioning, lack of regular exercise  · Spinal disc disease or arthritis  · Stress  · Pregnancy, or illness like appendicitis, bladder or kidney infection, pelvic infections   Home care  · For neck pain: Use a comfortable pillow that supports the · You may use over-the-counter medicine to control pain, unless another pain medicine was prescribed. If you have chronic conditions like diabetes, liver or kidney disease, stomach ulcers,  gastrointestinal bleeding, or are taking blood thinner medicines. Always begin any exercise program with stretching.  If you feel pain while doing any of these exercises, stop and talk to your doctor about a more specific exercise program that better suits your condition.   Low back stretch  The point of stretching is to 1. Wall squats: Stand with your back against the wall. Move your feet about 12 inches away from the wall. Tighten your stomach muscles, and slowly bend your knees until they are at about a 45 degree angle. Do not go down too far. Hold about 5 seconds.  Then 4. Abdominal crunch: Perform a pelvic tilt (above) flattening your lower back against the floor. Holding the tension in your abdominal muscles, take another breath and raise your shoulder blades off the ground (this is not a full sit-up).  Keep your head in © 2655-7213 [employer] [address]. [address] [facility], [address]. All rights reserved. This information is not intended as a substitute for professional medical care. Always follow your healthcare professional's instructions.         Back Sa · Driving. Sit close enough to the steering wheel to keep your knees slightly bent. For comfort, your knees should be level with your hips or just a bit lower. Sit as straight as you can. The curve of your lower back should be fully supported. · Walking.

## 2019-05-14 ENCOUNTER — NURSE TRIAGE (OUTPATIENT)
Dept: OTHER | Age: 54
End: 2019-05-14

## 2019-05-14 NOTE — TELEPHONE ENCOUNTER
Patient is on Plavix and so unable to treat with any ibuprofen like medications. I see that he has been seen by Dr. Og Mccarthy on 5/8/2019.   Please have patient follow-up as may need further work-up for the gastrointestinal symptoms he has had and his back p

## 2019-05-14 NOTE — TELEPHONE ENCOUNTER
Action Requested: Summary for Provider     []  Critical Lab, Recommendations Needed  [x] Need Additional Advice  []   FYI    []   Need Orders  [x] Need Medications Sent to Pharmacy  []  Other     SUMMARY: Pt c/o back pain 6/10 x 3 days.  Location mid back

## 2019-05-16 ENCOUNTER — HOSPITAL ENCOUNTER (EMERGENCY)
Facility: HOSPITAL | Age: 54
Discharge: HOME | End: 2019-05-16
Attending: EMERGENCY MEDICINE
Payer: COMMERCIAL

## 2019-05-16 ENCOUNTER — APPOINTMENT (OUTPATIENT)
Dept: GENERAL RADIOLOGY | Facility: HOSPITAL | Age: 54
End: 2019-05-16
Attending: EMERGENCY MEDICINE
Payer: COMMERCIAL

## 2019-05-16 ENCOUNTER — TELEPHONE (OUTPATIENT)
Dept: CARDIOLOGY CLINIC | Facility: CLINIC | Age: 54
End: 2019-05-16

## 2019-05-16 VITALS
DIASTOLIC BLOOD PRESSURE: 89 MMHG | RESPIRATION RATE: 17 BRPM | WEIGHT: 138 LBS | SYSTOLIC BLOOD PRESSURE: 123 MMHG | BODY MASS INDEX: 19.76 KG/M2 | HEIGHT: 70 IN | HEART RATE: 68 BPM | TEMPERATURE: 99 F | OXYGEN SATURATION: 98 %

## 2019-05-16 DIAGNOSIS — R07.9 CHEST PAIN OF UNCERTAIN ETIOLOGY: Primary | ICD-10-CM

## 2019-05-16 PROCEDURE — 71046 X-RAY EXAM CHEST 2 VIEWS: CPT | Performed by: EMERGENCY MEDICINE

## 2019-05-16 PROCEDURE — 80076 HEPATIC FUNCTION PANEL: CPT | Performed by: EMERGENCY MEDICINE

## 2019-05-16 PROCEDURE — 80048 BASIC METABOLIC PNL TOTAL CA: CPT | Performed by: EMERGENCY MEDICINE

## 2019-05-16 PROCEDURE — 84484 ASSAY OF TROPONIN QUANT: CPT | Performed by: EMERGENCY MEDICINE

## 2019-05-16 PROCEDURE — 83690 ASSAY OF LIPASE: CPT | Performed by: EMERGENCY MEDICINE

## 2019-05-16 PROCEDURE — 99284 EMERGENCY DEPT VISIT MOD MDM: CPT

## 2019-05-16 PROCEDURE — 36415 COLL VENOUS BLD VENIPUNCTURE: CPT

## 2019-05-16 PROCEDURE — 85025 COMPLETE CBC W/AUTO DIFF WBC: CPT | Performed by: EMERGENCY MEDICINE

## 2019-05-16 PROCEDURE — 93005 ELECTROCARDIOGRAM TRACING: CPT

## 2019-05-16 PROCEDURE — 93010 ELECTROCARDIOGRAM REPORT: CPT | Performed by: EMERGENCY MEDICINE

## 2019-05-16 NOTE — ED INITIAL ASSESSMENT (HPI)
Pt with gas, CP and back pain for a couple days now, intermittent in nature. NO nausea or vomiting or diarrhea.

## 2019-05-16 NOTE — TELEPHONE ENCOUNTER
S/w Wendi, no active chest pain at this time. States he saw PCP on 5/8 for back issue, gerd. States he thinks he has chest pain sometimes, and is worried if he's okay. instructed if active chest pains he should go to the ER to be assessed.   Pt reques

## 2019-05-16 NOTE — TELEPHONE ENCOUNTER
Pt states that he has been having some back pain that radiates to chest sometimes. Call transferred to RN.

## 2019-05-17 NOTE — ED PROVIDER NOTES
Patient Seen in: [facility] AND [facility] Emergency Department    History   Patient presents with:  Back Pain (musculoskeletal)  Chest Pain Angina (cardiovascular)    Stated Complaint:     HPI    48year old male with a past medical history of CAD s/p stents x2 1967    per NG: intestinal surgery           Social History    Tobacco Use      Smoking status: Never Smoker      Smokeless tobacco: Never Used    Alcohol use: No    Drug use: No      Review of Systems    Positive for stated complaint:   Other systems are mood and affect. His behavior is normal.   Nursing note and vitals reviewed.       ED Course     Labs Reviewed   BASIC METABOLIC PANEL (8) - Abnormal; Notable for the following components:       Result Value    Glucose 108 (*)     All other components withi and given return precautions.                Disposition and Plan     Clinical Impression:  Chest pain of uncertain etiology  (primary encounter diagnosis)    Disposition:  Discharge  5/16/2019  9:08 pm    Follow-up:  MD Nitin Phillips 95

## 2019-05-21 ENCOUNTER — TELEPHONE (OUTPATIENT)
Dept: CARDIOLOGY CLINIC | Facility: CLINIC | Age: 54
End: 2019-05-21

## 2019-05-21 NOTE — TELEPHONE ENCOUNTER
Attempted to call pt, no answer.   Left a detailed voice message informing pt that per his insurance plan, no PA is required for Stress Echo and that he may proceed with test.  Advised in voice message that pt should call his health plan's benefit dept prio

## 2019-05-21 NOTE — TELEPHONE ENCOUNTER
Called [employer] to initiate a PA for Stress Echo, per agent CJ, no pre-certification is required for test.  Confirmation number: [id number].

## 2019-05-21 NOTE — TELEPHONE ENCOUNTER
Scheduled 5/28/19   CARD ECHO STRESS ECHO/REST AND STRESS(CPT=93350/24502 [facility] [address]  Dx: Atypical chest pain [R07.89 (ICD

## 2019-05-28 ENCOUNTER — HOSPITAL ENCOUNTER (OUTPATIENT)
Dept: CV DIAGNOSTICS | Facility: HOSPITAL | Age: 54
Discharge: HOME | End: 2019-05-28
Attending: INTERNAL MEDICINE
Payer: COMMERCIAL

## 2019-05-28 DIAGNOSIS — R07.89 ATYPICAL CHEST PAIN: ICD-10-CM

## 2019-05-28 PROCEDURE — 93017 CV STRESS TEST TRACING ONLY: CPT | Performed by: INTERNAL MEDICINE

## 2019-05-28 PROCEDURE — 93350 STRESS TTE ONLY: CPT | Performed by: INTERNAL MEDICINE

## 2019-05-28 PROCEDURE — 93018 CV STRESS TEST I&R ONLY: CPT | Performed by: INTERNAL MEDICINE

## 2019-05-29 ENCOUNTER — PATIENT MESSAGE (OUTPATIENT)
Dept: CARDIOLOGY CLINIC | Facility: CLINIC | Age: 54
End: 2019-05-29

## 2019-05-29 NOTE — TELEPHONE ENCOUNTER
From: Severa Balls  To: Shira Brown MD  Sent: 5/29/2019 1:56 PM CDT  Subject: Test Results Question    Good afternoon,     On 5/28 I went at the [facility] in [address] [place] [address] for my stress test. Please let me know my results.       Thank you   N

## 2019-05-30 ENCOUNTER — PATIENT MESSAGE (OUTPATIENT)
Dept: CARDIOLOGY CLINIC | Facility: CLINIC | Age: 54
End: 2019-05-30

## 2019-05-30 ENCOUNTER — TELEPHONE (OUTPATIENT)
Dept: CARDIOLOGY CLINIC | Facility: CLINIC | Age: 54
End: 2019-05-30

## 2019-05-30 NOTE — TELEPHONE ENCOUNTER
Spoke with patient (verified name name and ). Informed patient of normal stress test per [facility] and to continue on present medications. Reviewed medications with patient.  Patient verbalizes understanding of test results and instructions regarding medication

## 2019-05-31 ENCOUNTER — PATIENT MESSAGE (OUTPATIENT)
Dept: CARDIOLOGY CLINIC | Facility: CLINIC | Age: 54
End: 2019-05-31

## 2019-07-05 ENCOUNTER — PATIENT MESSAGE (OUTPATIENT)
Dept: INTERNAL MEDICINE CLINIC | Facility: CLINIC | Age: 54
End: 2019-07-05

## 2019-07-05 ENCOUNTER — TELEPHONE (OUTPATIENT)
Dept: INTERNAL MEDICINE CLINIC | Facility: CLINIC | Age: 54
End: 2019-07-05

## 2019-07-05 NOTE — TELEPHONE ENCOUNTER
From: Aden Burrell  To: Madisyn Razo MD  Sent: 7/5/2019 10:35 AM CDT  Subject: Non-Urgent Medical Question    Good morning,   My left ear is ringing a lot and I feel some pressure. Please advise what I need to do.     Thank you    Lexii Mayer

## 2019-07-05 NOTE — TELEPHONE ENCOUNTER
Called patient due to MyChart message. Pt states for the last three days, ringing in his left ear and the feeling of it being clogged. Denies any pain or fever.  Patient states he usually cleans his ears and stated he would first try to to that himself, an

## 2019-07-06 ENCOUNTER — OFFICE VISIT (OUTPATIENT)
Dept: PODIATRY CLINIC | Facility: CLINIC | Age: 54
End: 2019-07-06
Payer: COMMERCIAL

## 2019-07-06 DIAGNOSIS — B35.1 ONYCHOMYCOSIS: Primary | ICD-10-CM

## 2019-07-06 PROCEDURE — 99213 OFFICE O/P EST LOW 20 MIN: CPT | Performed by: PODIATRIST

## 2019-07-08 NOTE — PROGRESS NOTES
Nick Smith is a 48year old male. Patient presents with:  Toenail Fungus: left hallux -- States he stopped all medication about 1 mth ago. States toenails are worse. Denies any pain.          HPI:   Patient returns to the clinic he has been applying ANGIOPLASTY (CORONARY)     • CARDIAC CALCIUM SCORE  2012    cardiac calcium scan - score 159; cardiology consult; aggressive lipid rx.    • CARDIOVASCULAR PROCEDURE UNLISTED  2010    normal stress echo   • CATH DRUG ELUTING STENT     • COLONOSCOPY N/A 7/3/2 Patient has palpable pulses   3. Neurologic: Patient has intact sensorium no deficit   4. Musculoskeletal: Patient has good muscle strength.     ASSESSMENT AND PLAN:   Diagnoses and all orders for this visit:    Onychomycosis        Plan: Patient was seen t

## 2019-07-08 NOTE — TELEPHONE ENCOUNTER
Patient called today for same symptoms as the note below. Advised call center to assist with making him an appointment with Dr. Ovidio Goodson.  They agreed to assist

## 2019-07-13 ENCOUNTER — OFFICE VISIT (OUTPATIENT)
Dept: INTERNAL MEDICINE CLINIC | Facility: CLINIC | Age: 54
End: 2019-07-13
Payer: COMMERCIAL

## 2019-07-13 VITALS
TEMPERATURE: 98 F | WEIGHT: 143.63 LBS | HEIGHT: 70 IN | HEART RATE: 78 BPM | BODY MASS INDEX: 20.56 KG/M2 | RESPIRATION RATE: 18 BRPM | DIASTOLIC BLOOD PRESSURE: 76 MMHG | SYSTOLIC BLOOD PRESSURE: 132 MMHG

## 2019-07-13 DIAGNOSIS — H93.19 TINNITUS, UNSPECIFIED LATERALITY: ICD-10-CM

## 2019-07-13 DIAGNOSIS — I10 ESSENTIAL HYPERTENSION: ICD-10-CM

## 2019-07-13 DIAGNOSIS — H93.8X2 CONGESTION OF LEFT EAR: Primary | ICD-10-CM

## 2019-07-13 PROCEDURE — 99213 OFFICE O/P EST LOW 20 MIN: CPT | Performed by: INTERNAL MEDICINE

## 2019-07-13 NOTE — PROGRESS NOTES
HPI:    Patient ID: Eileen Ortiz is a 48year old male. HPI  Patient here complaining of lump with his left ear. Also some blood pressure follow-up. He has had problems in his left ear for the past 10 days ago. Started when he was in [place].   Was Problem Relation Age of Onset   • Heart Disease Father         premature CAD (cause of death)   • Lipids Mother         hyperlipidemia   • Stroke Paternal Grandmother         CVA   • Diabetes Other         family h/o   • Stroke Other         family h/o C normal.   Lymphadenopathy:     He has no cervical adenopathy. Skin: No rash noted. ASSESSMENT/PLAN:   1. Congestion of left ear  Exam is unremarkable. No evidence of infection.   Trial of Zyrtec 10 mg a day and Nasacort AQ 2 puffs each nostr

## 2019-07-13 NOTE — PATIENT INSTRUCTIONS
Try Zyrtec 10mg once a day for the ears. Can also try Nasacort spray; 2 puffs in each nostril daily. Contact the office if things are not improving.

## 2019-07-16 ENCOUNTER — OFFICE VISIT (OUTPATIENT)
Dept: CARDIOLOGY CLINIC | Facility: CLINIC | Age: 54
End: 2019-07-16
Payer: COMMERCIAL

## 2019-07-16 VITALS
DIASTOLIC BLOOD PRESSURE: 90 MMHG | SYSTOLIC BLOOD PRESSURE: 132 MMHG | HEART RATE: 64 BPM | BODY MASS INDEX: 20 KG/M2 | RESPIRATION RATE: 22 BRPM | WEIGHT: 141 LBS

## 2019-07-16 DIAGNOSIS — E78.5 HYPERLIPIDEMIA, UNSPECIFIED HYPERLIPIDEMIA TYPE: ICD-10-CM

## 2019-07-16 DIAGNOSIS — I10 ESSENTIAL HYPERTENSION: ICD-10-CM

## 2019-07-16 DIAGNOSIS — I25.119 CORONARY ARTERY DISEASE INVOLVING NATIVE HEART WITH ANGINA PECTORIS, UNSPECIFIED VESSEL OR LESION TYPE (HCC): Primary | ICD-10-CM

## 2019-07-16 PROCEDURE — 99214 OFFICE O/P EST MOD 30 MIN: CPT | Performed by: INTERNAL MEDICINE

## 2019-07-16 NOTE — PATIENT INSTRUCTIONS
Watch her diet and recheck cholesterol levels in 8 weeks. If LDL remains greater than 70 will consider increasing atorvastatin dose    Try stopping baby aspirin for 2 to 4 weeks to see if the ringing in the ear goes away.   If no change resume aspirin and

## 2019-07-16 NOTE — PROGRESS NOTES
[facility]  PROGRESS NOTE    Nick Smith is a 48year old male. Patient presents with:   Follow - Up: Stress test: 5/28/19   Hyperlipidemia  Hypertension    HPI:   This is a pleasant 48year old male with history of elevated cholesterol hypertensio High blood pressure    • High cholesterol    • Hyperlipidemia     currently on medication(atorvastatin)      Social History:  Social History    Tobacco Use      Smoking status: Never Smoker      Smokeless tobacco: Never Used    Alcohol use: No    Drug use: has had no recurrent angina. With vague here ringing he will hold aspirin for 2 to 4 weeks but continue Plavix to see if this helps. If no change she should resume a baby aspirin.   Patient will monitor blood pressures at home over the next 2 weeks to see

## 2019-07-23 ENCOUNTER — TELEPHONE (OUTPATIENT)
Dept: CARDIOLOGY CLINIC | Facility: CLINIC | Age: 54
End: 2019-07-23

## 2019-07-23 NOTE — TELEPHONE ENCOUNTER
Kristine/[facility] calling to request Retro prior auth for Cardio Echo Stress, must have claim# [id number], do not want date of service, they want date claim was submitted. Any medical records, has to be called in [address] QA#[phone]. Indicates called Medical records, however this must be called in. Any questions/cercerns, pls call [phone] ex:,thanks.   *oked sending te by [employer]

## 2019-07-26 NOTE — TELEPHONE ENCOUNTER
Kristine/[facility] calling for update on retro, pls call at:[phone],Ex:[phone],thanks.

## 2019-07-29 NOTE — TELEPHONE ENCOUNTER
S/w Eb from [facility]. And obtained PA # [id number] starting 5/28/19. Also Called and left PA info on Kristine extension. for her to call if any further questions.

## 2019-09-02 DIAGNOSIS — I10 ESSENTIAL HYPERTENSION: ICD-10-CM

## 2019-09-03 RX ORDER — LISINOPRIL 10 MG/1
TABLET ORAL
Qty: 90 TABLET | Refills: 1 | Status: SHIPPED | OUTPATIENT
Start: 2019-09-03 | End: 2019-12-23

## 2019-09-03 NOTE — TELEPHONE ENCOUNTER
Refill passed per [facility] protocol.   Hypertensive Medications  Protocol Criteria:  · Appointment scheduled in the past 6 months or in the next 3 months  · BMP or CMP in the past 12 months  · Creatinine result < 2  Recent Outpatient Visits

## 2019-10-05 ENCOUNTER — OFFICE VISIT (OUTPATIENT)
Dept: DERMATOLOGY CLINIC | Facility: CLINIC | Age: 54
End: 2019-10-05
Payer: COMMERCIAL

## 2019-10-05 DIAGNOSIS — L30.0 NUMMULAR ECZEMA: Primary | ICD-10-CM

## 2019-10-05 DIAGNOSIS — L81.0 POST-INFLAMMATORY HYPERPIGMENTATION: ICD-10-CM

## 2019-10-05 PROCEDURE — 99213 OFFICE O/P EST LOW 20 MIN: CPT | Performed by: DERMATOLOGY

## 2019-10-05 RX ORDER — CLOBETASOL PROPIONATE 0.5 MG/G
OINTMENT TOPICAL
Qty: 30 G | Refills: 2 | Status: SHIPPED | OUTPATIENT
Start: 2019-10-05 | End: 2021-01-19

## 2019-10-05 NOTE — PROGRESS NOTES
HPI:     Chief Complaint     Eczema        HPI     Eczema      Additional comments: LOV 2/2019. Patient presents for f/u of eczema to back and BLE. Using TAC when flaring. Not using any creams.            Last edited by TEREZA Alcazar on 10/5/2019 10:24 Allergies    Past Medical History:   Diagnosis Date   • Atherosclerosis of coronary artery June 26, 2015    Stenting of Left Circumflex and Ramus   • Coronary atherosclerosis    • Essential hypertension     currently on medication(Lisinopril)   • High bloo file      Intimate partner violence:        Fear of current or ex partner: Not on file        Emotionally abused: Not on file        Physically abused: Not on file        Forced sexual activity: Not on file    Other Topics      Concerns:         Se using the triamcinolone once the patches are less itchy and have less texture. Better use of moisturizer daily after showering to prevent this from continue to recur discussed.   I would like to reassess in a few months if not a lot better  Post-inflammato

## 2019-10-07 ENCOUNTER — OFFICE VISIT (OUTPATIENT)
Dept: PODIATRY CLINIC | Facility: CLINIC | Age: 54
End: 2019-10-07
Payer: COMMERCIAL

## 2019-10-07 DIAGNOSIS — B35.1 ONYCHOMYCOSIS: Primary | ICD-10-CM

## 2019-10-07 PROCEDURE — 99213 OFFICE O/P EST LOW 20 MIN: CPT | Performed by: PODIATRIST

## 2019-10-10 NOTE — PROGRESS NOTES
Severa Balls is a 48year old male. Patient presents with:  Toenail Fungus: LOV 7/6/19. pt feels it it the same, no improvement. pt would like to be advised on what to do. pt states he has been following the recommendations from Dr Cleve Baker at the [address] [place] [address].  h History:   Procedure Laterality Date   • ANGIOPLASTY (CORONARY)     • CARDIAC CALCIUM SCORE  2012    cardiac calcium scan - score 159; cardiology consult; aggressive lipid rx.    • CARDIOVASCULAR PROCEDURE UNLISTED  2010    normal stress echo   • CATH DRUG beginning to grow out. He has been applying the Penlac nail solution. 2. Vascular: Patient has palpable   3. Neurologic: Patient has intact sensorium   4. Musculoskeletal: The patient is ambulatory with no deficits.     ASSESSMENT AND PLAN:   Diagnoses a

## 2019-10-19 ENCOUNTER — PATIENT MESSAGE (OUTPATIENT)
Dept: CARDIOLOGY CLINIC | Facility: CLINIC | Age: 54
End: 2019-10-19

## 2019-10-22 NOTE — TELEPHONE ENCOUNTER
Pt requesting to have lab orders sent to [address] [place] [address] in [facility] [place] ph.[phone].  Please call pt once labs have been faxed [phone](OR states it is ok to leave a detailed message.)

## 2019-10-22 NOTE — TELEPHONE ENCOUNTER
Patient requesting to speak with nurse to advise that the fax listed as [facility] F[facility] is patients mobile number, so incorrect fax.  Patient does not know [facility] Fax, but would like orders sent prior to his upcoming appointment, please call [phone]

## 2019-10-22 NOTE — TELEPHONE ENCOUNTER
Patient confirmed Arcelia Mejía in [address] [facility], PZ:[phone], called for patient, [phone], patient requesting a call once fax is sent, and ok to leave voice message, thanks.

## 2019-11-04 ENCOUNTER — TELEPHONE (OUTPATIENT)
Dept: CARDIOLOGY CLINIC | Facility: CLINIC | Age: 54
End: 2019-11-04

## 2019-11-04 NOTE — TELEPHONE ENCOUNTER
Message sent via My chart with lipids values. Pt also aware will mail him a copy of labs received today.

## 2019-12-06 RX ORDER — ATORVASTATIN CALCIUM 20 MG/1
TABLET, FILM COATED ORAL
Qty: 90 TABLET | Refills: 1 | Status: SHIPPED | OUTPATIENT
Start: 2019-12-06 | End: 2019-12-23

## 2019-12-06 NOTE — TELEPHONE ENCOUNTER
90 Day Supply Request    Current Outpatient Medications:   •  Clopidogrel Bisulfate 75 MG Oral Tab, Take 1 tablet (75 mg total) by mouth once daily. , Disp: 30 tablet, Rfl: 5

## 2019-12-06 NOTE — TELEPHONE ENCOUNTER
Refill passed per [facility] protocol.   Cholesterol Medications  Protocol Criteria:  · Appointment scheduled in the past 12 months or in the next 3 months  · ALT & LDL on file in the past 12 months  · ALT result < 80  · LDL result <130   Recent Outpat

## 2019-12-07 NOTE — TELEPHONE ENCOUNTER
Review pended refill request as it does not fall under a protocol. Requested Prescriptions     Pending Prescriptions Disp Refills   • Clopidogrel Bisulfate 75 MG Oral Tab 30 tablet 5     Sig: Take 1 tablet (75 mg total) by mouth once daily.          Rece

## 2019-12-07 NOTE — TELEPHONE ENCOUNTER
Patient is calling to follow up on status of refill request of medication Clopidogrel Bisulfate 75 MG Oral Tab. Patient states he has 3 tablets left. Please advise.

## 2019-12-08 RX ORDER — CLOPIDOGREL BISULFATE 75 MG/1
75 TABLET ORAL
Qty: 90 TABLET | Refills: 1 | Status: SHIPPED | OUTPATIENT
Start: 2019-12-08 | End: 2019-12-23

## 2019-12-18 ENCOUNTER — PATIENT MESSAGE (OUTPATIENT)
Dept: INTERNAL MEDICINE CLINIC | Facility: CLINIC | Age: 54
End: 2019-12-18

## 2019-12-18 NOTE — TELEPHONE ENCOUNTER
From: [employer]  To: Champ Flowers MD  Sent: 12/18/2019 7:19 AM CST  Subject: Non-Urgent Medical Question    Hi,  I wanted to know if I already had the vaccine for Measles.      Thank you  [employer]

## 2019-12-23 ENCOUNTER — OFFICE VISIT (OUTPATIENT)
Dept: INTERNAL MEDICINE CLINIC | Facility: CLINIC | Age: 54
End: 2019-12-23
Payer: COMMERCIAL

## 2019-12-23 VITALS
TEMPERATURE: 98 F | BODY MASS INDEX: 20.96 KG/M2 | SYSTOLIC BLOOD PRESSURE: 108 MMHG | DIASTOLIC BLOOD PRESSURE: 74 MMHG | HEART RATE: 68 BPM | WEIGHT: 146.38 LBS | RESPIRATION RATE: 18 BRPM | HEIGHT: 70 IN

## 2019-12-23 DIAGNOSIS — H93.19 TINNITUS, UNSPECIFIED LATERALITY: ICD-10-CM

## 2019-12-23 DIAGNOSIS — I25.10 CORONARY ARTERY DISEASE INVOLVING NATIVE CORONARY ARTERY OF NATIVE HEART WITHOUT ANGINA PECTORIS: ICD-10-CM

## 2019-12-23 DIAGNOSIS — Z56.6 STRESS AT WORK: ICD-10-CM

## 2019-12-23 DIAGNOSIS — Z12.5 SCREENING FOR PROSTATE CANCER: ICD-10-CM

## 2019-12-23 DIAGNOSIS — I10 ESSENTIAL HYPERTENSION: Primary | ICD-10-CM

## 2019-12-23 PROCEDURE — 99396 PREV VISIT EST AGE 40-64: CPT | Performed by: INTERNAL MEDICINE

## 2019-12-23 RX ORDER — LISINOPRIL 10 MG/1
10 TABLET ORAL
Qty: 90 TABLET | Refills: 1 | Status: SHIPPED | OUTPATIENT
Start: 2019-12-23 | End: 2020-07-10

## 2019-12-23 RX ORDER — ATORVASTATIN CALCIUM 20 MG/1
TABLET, FILM COATED ORAL
Qty: 90 TABLET | Refills: 1 | Status: SHIPPED | OUTPATIENT
Start: 2019-12-23 | End: 2020-08-10

## 2019-12-23 RX ORDER — CLOPIDOGREL BISULFATE 75 MG/1
75 TABLET ORAL
Qty: 90 TABLET | Refills: 1 | Status: SHIPPED | OUTPATIENT
Start: 2019-12-23 | End: 2020-06-29

## 2019-12-23 SDOH — HEALTH STABILITY - MENTAL HEALTH: OTHER PHYSICAL AND MENTAL STRAIN RELATED TO WORK: Z56.6

## 2019-12-23 NOTE — PROGRESS NOTES
HPI:    Patient ID: Anni Hernandez is a 48year old male. HPI  Patient is here for follow-up on chronic medical issues and general checkup. Last seen here in July for ear issues. Since that time he saw cardiology.   They had him hold the aspirin for consult; aggressive lipid rx.    • CARDIOVASCULAR PROCEDURE UNLISTED  2010    normal stress echo   • CATH DRUG ELUTING STENT     • COLONOSCOPY N/A 7/3/2018    Performed by Nahomy Puckett MD at [id number] [place] [address] acetonide 0.1 % External Ointment APPLY TO AFFECTED AREA 1-2 TIMES A DAY AS NEEDED 80 g 1   • Ciclopirox 8 % External Solution Applied to affected nails, follow label directions on insert 6 mL 7   • Multiple Vitamin Oral Tab Take 1 tablet by mouth.      • o results. Work on diet and exercise. - CBC WITH DIFFERENTIAL WITH PLATELET; Future  - COMP METABOLIC PANEL (14); Future  - TSH W REFLEX TO FREE T4; Future  - CBC WITH DIFFERENTIAL WITH PLATELET  - COMP METABOLIC PANEL (14)  - TSH W REFLEX TO FREE T4    2.

## 2020-01-04 ENCOUNTER — OFFICE VISIT (OUTPATIENT)
Dept: PODIATRY CLINIC | Facility: CLINIC | Age: 55
End: 2020-01-04
Payer: COMMERCIAL

## 2020-01-04 DIAGNOSIS — B35.1 ONYCHOMYCOSIS: Primary | ICD-10-CM

## 2020-01-04 PROCEDURE — 99213 OFFICE O/P EST LOW 20 MIN: CPT | Performed by: PODIATRIST

## 2020-01-06 NOTE — PROGRESS NOTES
Aden Burrell is a 48year old male. Patient presents with:  Toenail Fungus: LOV 10/7/19. pt has been using penlac for last year and been using the cream 3 times per week. pt is seeing minimal improvment.          HPI:   Patient returns to the clinic he CALCIUM SCORE  2012    cardiac calcium scan - score 159; cardiology consult; aggressive lipid rx.    • CARDIOVASCULAR PROCEDURE UNLISTED  2010    normal stress echo   • CATH DRUG ELUTING STENT     • COLONOSCOPY N/A 7/3/2018    Performed by Raheem Caputo to the medial edge. 2. Vascular: Patient has palpable pulses   3. Neurologic: Patient has intact   4. Musculoskeletal: Patient does not have any gross foot deformities noted.     ASSESSMENT AND PLAN:   Diagnoses and all orders for this visit:    Brenomyc

## 2020-01-12 LAB
ABSOLUTE BASOPHILS: 32 CELLS/UL (ref 0–200)
ABSOLUTE EOSINOPHILS: 81 CELLS/UL (ref 15–500)
ABSOLUTE LYMPHOCYTES: 1134 CELLS/UL (ref 850–3900)
ABSOLUTE MONOCYTES: 293 CELLS/UL (ref 200–950)
ABSOLUTE NEUTROPHILS: 2961 CELLS/UL (ref 1500–7800)
ALBUMIN/GLOBULIN RATIO: 1.9 (CALC) (ref 1–2.5)
ALBUMIN: 4 G/DL (ref 3.6–5.1)
ALKALINE PHOSPHATASE: 60 U/L (ref 40–115)
ALT: 15 U/L (ref 9–46)
AST: 16 U/L (ref 10–35)
BASOPHILS: 0.7 %
BILIRUBIN, TOTAL: 0.4 MG/DL (ref 0.2–1.2)
BUN: 20 MG/DL (ref 7–25)
CALCIUM: 9 MG/DL (ref 8.6–10.3)
CARBON DIOXIDE: 29 MMOL/L (ref 20–32)
CHLORIDE: 107 MMOL/L (ref 98–110)
CREATININE: 0.94 MG/DL (ref 0.7–1.33)
EGFR IF AFRICN AM: 108 ML/MIN/1.73M2
EGFR IF NONAFRICN AM: 93 ML/MIN/1.73M2
EOSINOPHILS: 1.8 %
GLOBULIN: 2.1 G/DL (CALC) (ref 1.9–3.7)
GLUCOSE: 105 MG/DL (ref 65–99)
HEMATOCRIT: 42.7 % (ref 38.5–50)
HEMOGLOBIN: 14.6 G/DL (ref 13.2–17.1)
LYMPHOCYTES: 25.2 %
MCH: 31 PG (ref 27–33)
MCHC: 34.2 G/DL (ref 32–36)
MCV: 90.7 FL (ref 80–100)
MONOCYTES: 6.5 %
MPV: 10.3 FL (ref 7.5–12.5)
NEUTROPHILS: 65.8 %
PLATELET COUNT: 190 THOUSAND/UL (ref 140–400)
POTASSIUM: 4.2 MMOL/L (ref 3.5–5.3)
PROTEIN, TOTAL: 6.1 G/DL (ref 6.1–8.1)
RDW: 12.3 % (ref 11–15)
RED BLOOD CELL COUNT: 4.71 MILLION/UL (ref 4.2–5.8)
SODIUM: 140 MMOL/L (ref 135–146)
TSH W/REFLEX TO FT4: 1.01 MIU/L (ref 0.4–4.5)
WHITE BLOOD CELL COUNT: 4.5 THOUSAND/UL (ref 3.8–10.8)

## 2020-02-07 ENCOUNTER — NURSE TRIAGE (OUTPATIENT)
Dept: OTHER | Age: 55
End: 2020-02-07

## 2020-02-07 NOTE — TELEPHONE ENCOUNTER
Action Requested: Summary for Provider     []  Critical Lab, Recommendations Needed  [] Need Additional Advice  []   FYI    []   Need Orders  [] Need Medications Sent to Pharmacy  []  Other     SUMMARY:  Per protocol home care advised. Pt agreed .  Will paris

## 2020-02-07 NOTE — TELEPHONE ENCOUNTER
----- Message from Via Doroteo Gonzalez. Peeroo sent at 2/7/2020  8:41 AM CST -----  Regarding: Non-Urgent Medical Question  Contact: [phone]  Good morning,  It seem that my ears are blocked and ringing a lot lately. Also my eyes is bothering me.  Is there any me

## 2020-02-12 ENCOUNTER — NURSE TRIAGE (OUTPATIENT)
Dept: INTERNAL MEDICINE CLINIC | Facility: CLINIC | Age: 55
End: 2020-02-12

## 2020-02-12 ENCOUNTER — PATIENT MESSAGE (OUTPATIENT)
Dept: INTERNAL MEDICINE CLINIC | Facility: CLINIC | Age: 55
End: 2020-02-12

## 2020-02-12 ENCOUNTER — PATIENT MESSAGE (OUTPATIENT)
Dept: PODIATRY CLINIC | Facility: CLINIC | Age: 55
End: 2020-02-12

## 2020-02-12 DIAGNOSIS — B35.1 ONYCHOMYCOSIS: Primary | ICD-10-CM

## 2020-02-12 NOTE — TELEPHONE ENCOUNTER
From: Luba Posada  To: Yanna Valdez MD  Sent: 2/12/2020 6:35 AM CST  Subject: Non-Urgent Medical Question    Hi,  I am not feeling well and I feel that my ears are blocked. I also feel. that the ringing in the ears is louder since the tube in my ear

## 2020-02-12 NOTE — TELEPHONE ENCOUNTER
Action Requested: Summary for Provider     []  Critical Lab, Recommendations Needed  [] Need Additional Advice  []   FYI    []   Need Orders  [] Need Medications Sent to Pharmacy  []  Other     SUMMARY: Per protocol advised : OV within 3 days, patient nichole

## 2020-02-12 NOTE — TELEPHONE ENCOUNTER
On-site RN, please call patient and triage regarding MyChart message copied here. [email or web address] message response sent in original MyChart encounter, per department process. ----- Message from Via Doroteo Gonzalez.  Barron sent at 2/12/2020  6:35 AM CST -----  Regarding: No

## 2020-03-18 ENCOUNTER — TELEPHONE (OUTPATIENT)
Dept: INTERNAL MEDICINE CLINIC | Facility: CLINIC | Age: 55
End: 2020-03-18

## 2020-03-18 NOTE — TELEPHONE ENCOUNTER
Patient is requesting a letter from Dr. Hayede Huizar to work from home. Patient states due to the corona virus issue he would like to work from home.     See Non Urgent Medical Question Encounter 03/15/2020    Patient is requesting letter be posted to his

## 2020-03-18 NOTE — TELEPHONE ENCOUNTER
Left message for patient to call office back, please transfer call to ext. [phone] when he calls office back. Require further information. What type of work does this patient do?   Does he have any issues/symptoms or has he been in contact with a person that

## 2020-03-25 ENCOUNTER — PATIENT MESSAGE (OUTPATIENT)
Dept: INTERNAL MEDICINE CLINIC | Facility: CLINIC | Age: 55
End: 2020-03-25

## 2020-03-25 NOTE — TELEPHONE ENCOUNTER
From: Olga Breath  To: Lucía Moore MD  Sent: 3/25/2020 1:15 PM CDT  Subject: Non-Urgent Medical Question    Hi,  Thank you for the message. Since it is a court order(Jury Duty) .  Can you please provide me a letter stating my underlying health cond

## 2020-03-25 NOTE — TELEPHONE ENCOUNTER
From: Angeles Doshi  To: Gwen Nolen MD  Sent: 3/25/2020 7:46 AM CDT  Subject: Non-Urgent Medical Question    Good morning,  I have a Jury Duty on April 14. With the ongoing Cornovirus and my health condition.  Do you think it is safe to go since the

## 2020-03-25 NOTE — TELEPHONE ENCOUNTER
Wendi LEI Peeroo    At this time it is not safe to be in crowds.       KATIE Richardson  Working with Dr. Kelsey Lynch

## 2020-03-25 NOTE — TELEPHONE ENCOUNTER
Wendi A Peeroo    Patient phoned and letter sent to [facility] for patient not to attend jury duty.     Suzan Bean, ANP

## 2020-04-11 ENCOUNTER — PATIENT MESSAGE (OUTPATIENT)
Dept: PODIATRY CLINIC | Facility: CLINIC | Age: 55
End: 2020-04-11

## 2020-04-11 DIAGNOSIS — B35.1 ONYCHOMYCOSIS: Primary | ICD-10-CM

## 2020-04-13 NOTE — TELEPHONE ENCOUNTER
From: Toni Singleton  To: Rose Araujo DPM  Sent: 4/11/2020 8:14 PM CDT  Subject: Non-Urgent Medical Question    Hi,  I am running out of Jublia medication for the treatment for my nail.  Can you please send the order for a refill at [facility]

## 2020-06-08 ENCOUNTER — TELEPHONE (OUTPATIENT)
Dept: CARDIOLOGY CLINIC | Facility: CLINIC | Age: 55
End: 2020-06-08

## 2020-06-29 RX ORDER — CLOPIDOGREL BISULFATE 75 MG/1
TABLET ORAL
Qty: 30 TABLET | Refills: 0 | Status: SHIPPED | OUTPATIENT
Start: 2020-06-29 | End: 2020-07-31

## 2020-07-10 RX ORDER — LISINOPRIL 10 MG/1
TABLET ORAL
Qty: 30 TABLET | Refills: 5 | Status: SHIPPED | OUTPATIENT
Start: 2020-07-10 | End: 2021-01-06

## 2020-07-11 ENCOUNTER — OFFICE VISIT (OUTPATIENT)
Dept: INTERNAL MEDICINE CLINIC | Facility: CLINIC | Age: 55
End: 2020-07-11
Payer: COMMERCIAL

## 2020-07-11 VITALS
WEIGHT: 146.13 LBS | DIASTOLIC BLOOD PRESSURE: 88 MMHG | HEART RATE: 96 BPM | SYSTOLIC BLOOD PRESSURE: 130 MMHG | BODY MASS INDEX: 20.92 KG/M2 | HEIGHT: 70 IN

## 2020-07-11 DIAGNOSIS — E78.5 HYPERLIPIDEMIA, UNSPECIFIED HYPERLIPIDEMIA TYPE: ICD-10-CM

## 2020-07-11 DIAGNOSIS — H93.19 TINNITUS, UNSPECIFIED LATERALITY: ICD-10-CM

## 2020-07-11 DIAGNOSIS — R09.81 NASAL CONGESTION: ICD-10-CM

## 2020-07-11 DIAGNOSIS — I25.10 CORONARY ARTERY DISEASE INVOLVING NATIVE CORONARY ARTERY OF NATIVE HEART WITHOUT ANGINA PECTORIS: ICD-10-CM

## 2020-07-11 DIAGNOSIS — I10 ESSENTIAL HYPERTENSION: Primary | ICD-10-CM

## 2020-07-11 PROCEDURE — 99214 OFFICE O/P EST MOD 30 MIN: CPT | Performed by: INTERNAL MEDICINE

## 2020-07-11 PROCEDURE — 3079F DIAST BP 80-89 MM HG: CPT | Performed by: INTERNAL MEDICINE

## 2020-07-11 PROCEDURE — 3008F BODY MASS INDEX DOCD: CPT | Performed by: INTERNAL MEDICINE

## 2020-07-11 PROCEDURE — 3075F SYST BP GE 130 - 139MM HG: CPT | Performed by: INTERNAL MEDICINE

## 2020-07-11 NOTE — PROGRESS NOTES
HPI:    Patient ID: Merline Madrid is a 48year old male. HPI  Patient is here for follow-up on chronic medical issues as listed below. Last seen here in December. Complaint at that time of the tinnitus which she has had for years.   He was referred PROCEDURE UNLISTED  2010    normal stress echo   • CATH DRUG ELUTING STENT     • COLONOSCOPY N/A 7/3/2018    Performed by Karthik Hagen MD at [address]: intestinal surgery      Family Hi label directions on insert 6 mL 7   • Multiple Vitamin Oral Tab Take 1 tablet by mouth. • omega-3 fatty acids (FISH OIL) 1000 MG Oral Cap Take 1,000 mg by mouth daily.  90 capsule 3   • aspirin (ASPIRIN ADULT LOW STRENGTH) 81 MG Oral Tab EC Take 81 mg b laterality  Chronic issue for the patient. Not clear if there will ever be treatment or cure. Again consider ENT or tertiary referral.    4. Hyperlipidemia, unspecified hyperlipidemia type  Continue current treatment.     5. Nasal congestion  Advised Flon

## 2020-07-27 ENCOUNTER — OFFICE VISIT (OUTPATIENT)
Dept: PODIATRY CLINIC | Facility: CLINIC | Age: 55
End: 2020-07-27
Payer: COMMERCIAL

## 2020-07-27 DIAGNOSIS — B35.1 ONYCHOMYCOSIS: Primary | ICD-10-CM

## 2020-07-27 PROCEDURE — 99213 OFFICE O/P EST LOW 20 MIN: CPT | Performed by: PODIATRIST

## 2020-07-28 NOTE — PROGRESS NOTES
Beti Foley is a 48year old male. Patient presents with:  Toenail Fungus: Patient is using Lamisil topical to treat toe nail fungus on the left hallux. Patient sees some improvement, not a lot but some. Patient denies any pain, swelling of foot. PROCEDURE UNLISTED  2010    normal stress echo   • CATH DRUG ELUTING STENT     • COLONOSCOPY N/A 7/3/2018    Performed by Nahomy Puckett MD at [address]: intestinal surgery      Family Hi Vascular: The patient has palpable pulses both dorsalis pedis and posterior tibial   3. Neurologic: Patient has pain sensation intact   4. Musculoskeletal: The patient has good muscle strength and he is ambulatory.     ASSESSMENT AND PLAN:   Diagnoses and a

## 2020-07-31 ENCOUNTER — PATIENT MESSAGE (OUTPATIENT)
Dept: INTERNAL MEDICINE CLINIC | Facility: CLINIC | Age: 55
End: 2020-07-31

## 2020-07-31 DIAGNOSIS — Z23 NEED FOR VACCINATION: Primary | ICD-10-CM

## 2020-07-31 RX ORDER — CLOPIDOGREL BISULFATE 75 MG/1
TABLET ORAL
Qty: 30 TABLET | Refills: 5 | Status: SHIPPED | OUTPATIENT
Start: 2020-07-31 | End: 2021-01-11

## 2020-08-01 NOTE — TELEPHONE ENCOUNTER
From: Natalia Avelar  To: Haydee Huizar MD  Sent: 7/31/2020 10:13 PM CDT  Subject: Other    Hi,  I received a msg on my chart about the pneumonia vaccine which is due. Do I need to do it?   Thank you

## 2020-08-01 NOTE — TELEPHONE ENCOUNTER
Routed to  for advise, thanks.     Future Appointments   Date Time Provider Rafaela Blackburn   8/4/2020  3:45 PM Debbie Doty MD [facility]   12/24/2020  4:00 PM Tam david [facility]   1/11/2021  4:40 PM Ivis Flores

## 2020-08-03 NOTE — TELEPHONE ENCOUNTER
Patient is calling to follow up and states he is interested in getting the Pneumovax vaccine. Patient is requesting call back once order has been placed so he can set up appointment.

## 2020-08-04 ENCOUNTER — PATIENT MESSAGE (OUTPATIENT)
Dept: CARDIOLOGY CLINIC | Facility: CLINIC | Age: 55
End: 2020-08-04

## 2020-08-04 ENCOUNTER — NURSE TRIAGE (OUTPATIENT)
Dept: INTERNAL MEDICINE CLINIC | Facility: CLINIC | Age: 55
End: 2020-08-04

## 2020-08-04 ENCOUNTER — TELEMEDICINE (OUTPATIENT)
Dept: INTERNAL MEDICINE CLINIC | Facility: CLINIC | Age: 55
End: 2020-08-04
Payer: COMMERCIAL

## 2020-08-04 ENCOUNTER — TELEPHONE (OUTPATIENT)
Dept: INTERNAL MEDICINE CLINIC | Facility: CLINIC | Age: 55
End: 2020-08-04

## 2020-08-04 ENCOUNTER — OFFICE VISIT (OUTPATIENT)
Dept: CARDIOLOGY CLINIC | Facility: CLINIC | Age: 55
End: 2020-08-04
Payer: COMMERCIAL

## 2020-08-04 VITALS
HEART RATE: 70 BPM | SYSTOLIC BLOOD PRESSURE: 137 MMHG | DIASTOLIC BLOOD PRESSURE: 88 MMHG | RESPIRATION RATE: 18 BRPM | BODY MASS INDEX: 20.76 KG/M2 | HEIGHT: 70 IN | WEIGHT: 145 LBS

## 2020-08-04 DIAGNOSIS — R14.0 ABDOMINAL BLOATING: Primary | ICD-10-CM

## 2020-08-04 DIAGNOSIS — I10 ESSENTIAL HYPERTENSION: ICD-10-CM

## 2020-08-04 DIAGNOSIS — E78.5 HYPERLIPIDEMIA, UNSPECIFIED HYPERLIPIDEMIA TYPE: ICD-10-CM

## 2020-08-04 DIAGNOSIS — I25.119 CORONARY ARTERY DISEASE INVOLVING NATIVE HEART WITH ANGINA PECTORIS, UNSPECIFIED VESSEL OR LESION TYPE (HCC): Primary | ICD-10-CM

## 2020-08-04 PROCEDURE — 3075F SYST BP GE 130 - 139MM HG: CPT | Performed by: INTERNAL MEDICINE

## 2020-08-04 PROCEDURE — 3008F BODY MASS INDEX DOCD: CPT | Performed by: INTERNAL MEDICINE

## 2020-08-04 PROCEDURE — 99214 OFFICE O/P EST MOD 30 MIN: CPT | Performed by: INTERNAL MEDICINE

## 2020-08-04 PROCEDURE — 99213 OFFICE O/P EST LOW 20 MIN: CPT | Performed by: NURSE PRACTITIONER

## 2020-08-04 PROCEDURE — 3079F DIAST BP 80-89 MM HG: CPT | Performed by: INTERNAL MEDICINE

## 2020-08-04 NOTE — PATIENT INSTRUCTIONS
Call the office with recent cholesterol results for review to see if cholesterol medicine should be adjusted    Continue present medicines for now    Schedule stress echocardiogram to be done in 1 year just prior to follow-up visit

## 2020-08-04 NOTE — ASSESSMENT & PLAN NOTE
A/P-48year-old male who complains of abdominal pain and bloating. This started after he was eating a screen on Sunday. At the video visit he denies any pain. He states the pain is intermittent and mainly occurs after he eats.   He has a history of hyper

## 2020-08-04 NOTE — TELEPHONE ENCOUNTER
Action Requested: Summary for Provider     []  Critical Lab, Recommendations Needed  [] Need Additional Advice  []   FYI    []   Need Orders  [] Need Medications Sent to Pharmacy  []  Other     SUMMARY: Patient experiencing mild gas pain since Sunday.  He i

## 2020-08-04 NOTE — TELEPHONE ENCOUNTER
Patient calling and asking is there something he can take he have a underlying condition gastro and have a stomach ache       Please advise   [phone]

## 2020-08-04 NOTE — TELEPHONE ENCOUNTER
----- Message from Via Doroteo Gonzalez. Peeroo sent at 8/3/2020  6:02 PM CDT -----  Regarding: Prescription Question  Contact: [phone]  Hi,  I have a tummy ache since 24 hrs. And I feel bloaty.   Please advise me which medication I [facility]

## 2020-08-04 NOTE — PROGRESS NOTES
[facility]  PROGRESS NOTE    Aden Burrell is a 48year old male. Patient presents with:   Follow - Up  CAD: PTCA w/ stent 2015  Hypertension  Hyperlipidemia    HPI:   This is a pleasant 48year old male with coronary disease status post PCI to the c History:  Social History    Tobacco Use      Smoking status: Never Smoker      Smokeless tobacco: Never Used    Alcohol use: No    Drug use: No    Family History  Family History   Problem Relation Age of Onset   • Heart Disease Father         premature CAD 55999)    2019 NOVEL CORONAVIRUS SARS-COV-2 BY PCR([facility])          In conclusion, this pleasant 48year old male with known coronary disease status post PCI in 2015 with collateralized right who feels well.   With residual coronary disease would continue kendy

## 2020-08-05 NOTE — TELEPHONE ENCOUNTER
Agata please see patient's labs attached and advise. Last office visit with Dr. Brandon Good 8/4/20    From: Conrad Nuñez Peeroo  To: Izabela Connelly MD  Sent: 8/4/2020  6:37 PM CDT  Subject: Other    Please find attached my latest blood result as requested.  If

## 2020-08-06 ENCOUNTER — PATIENT MESSAGE (OUTPATIENT)
Dept: CARDIOLOGY CLINIC | Facility: CLINIC | Age: 55
End: 2020-08-06

## 2020-08-10 RX ORDER — ATORVASTATIN CALCIUM 20 MG/1
TABLET, FILM COATED ORAL
Qty: 90 TABLET | Refills: 0 | Status: SHIPPED | OUTPATIENT
Start: 2020-08-10 | End: 2020-12-01

## 2020-08-10 NOTE — TELEPHONE ENCOUNTER
From: Eileen Ortiz  To: Elmer Hill MD  Sent: 8/6/2020 5:14 PM CDT  Subject: Other    Hi,  Please let me know if you received my latest blood results that I sent via my chart on August 5 2020. Thank you.

## 2020-08-18 ENCOUNTER — TELEPHONE (OUTPATIENT)
Dept: CARDIOLOGY CLINIC | Facility: CLINIC | Age: 55
End: 2020-08-18

## 2020-08-18 NOTE — TELEPHONE ENCOUNTER
Spoke with patient he received letter of approval for stress test. Confirmed not to do test until next year prior to follow up. Advised will notify insurance verification department. Please note, the prior authorization obtained too soon. Dr. Derik Teixeirabury him to schedule stress echo just prior to 1 year follow up (8/2021).

## 2020-08-21 ENCOUNTER — NURSE ONLY (OUTPATIENT)
Dept: INTERNAL MEDICINE CLINIC | Facility: CLINIC | Age: 55
End: 2020-08-21
Payer: COMMERCIAL

## 2020-08-21 PROCEDURE — 90732 PPSV23 VACC 2 YRS+ SUBQ/IM: CPT | Performed by: INTERNAL MEDICINE

## 2020-08-21 PROCEDURE — 90471 IMMUNIZATION ADMIN: CPT | Performed by: INTERNAL MEDICINE

## 2020-08-21 NOTE — PROGRESS NOTES
Patient came for nurse visit, verified orders and . Pt was administered pneumovax 23 on left deltoid. Pt tolerated injection, no reactions.

## 2020-08-31 ENCOUNTER — NURSE TRIAGE (OUTPATIENT)
Dept: INTERNAL MEDICINE CLINIC | Facility: CLINIC | Age: 55
End: 2020-08-31

## 2020-08-31 NOTE — TELEPHONE ENCOUNTER
Patient should take Tylenol for the pain. Ice the knee regularly for 20 to 30 minutes a few times a day. Can also use a knee brace. This may help with walking.   He should not take ibuprofen, naproxen, or any NSAIDs because of his medical conditions and

## 2020-08-31 NOTE — TELEPHONE ENCOUNTER
Called patient. Informed him of provider's note. He verbalized understanding and stated he will call back if symptoms do not improve. Also reviewed Tylenol instructions with patient.

## 2020-08-31 NOTE — TELEPHONE ENCOUNTER
L knee pain, swelling,5/10, occurred yesterday after hitting the front of his knee with a bag. He had difficulty walking yesterday, applied ice, walking better today.  Since he is on anticoagulants, he would like to know what to take for pain and swelling,

## 2020-09-12 ENCOUNTER — NURSE TRIAGE (OUTPATIENT)
Dept: INTERNAL MEDICINE CLINIC | Facility: CLINIC | Age: 55
End: 2020-09-12

## 2020-09-12 NOTE — TELEPHONE ENCOUNTER
----- Message from Via Doroteo Gonzalez. Peeroo sent at 9/12/2020  9:44 AM CDT -----  Regarding: Non-Urgent Medical Question  Contact: [phone]  Hi!! Two weeks ago, I bumped my knee (not that hard) into the side of the bed and it has been hurting since.   It is

## 2020-09-12 NOTE — TELEPHONE ENCOUNTER
Action Requested: Summary for Provider     []  Critical Lab, Recommendations Needed  [] Need Additional Advice  []   FYI    []   Need Orders  [] Need Medications Sent to Pharmacy  []  Other     SUMMARY: Dr Marcos Feliciano, patient reports knee injury onset about 2

## 2020-09-14 ENCOUNTER — OFFICE VISIT (OUTPATIENT)
Dept: INTERNAL MEDICINE CLINIC | Facility: CLINIC | Age: 55
End: 2020-09-14
Payer: COMMERCIAL

## 2020-09-14 VITALS
RESPIRATION RATE: 20 BRPM | HEIGHT: 70 IN | WEIGHT: 146 LBS | HEART RATE: 84 BPM | SYSTOLIC BLOOD PRESSURE: 118 MMHG | BODY MASS INDEX: 20.9 KG/M2 | DIASTOLIC BLOOD PRESSURE: 78 MMHG

## 2020-09-14 DIAGNOSIS — M25.562 ACUTE PAIN OF LEFT KNEE: Primary | ICD-10-CM

## 2020-09-14 PROCEDURE — 3008F BODY MASS INDEX DOCD: CPT | Performed by: INTERNAL MEDICINE

## 2020-09-14 PROCEDURE — 3074F SYST BP LT 130 MM HG: CPT | Performed by: INTERNAL MEDICINE

## 2020-09-14 PROCEDURE — 99213 OFFICE O/P EST LOW 20 MIN: CPT | Performed by: INTERNAL MEDICINE

## 2020-09-14 PROCEDURE — 3078F DIAST BP <80 MM HG: CPT | Performed by: INTERNAL MEDICINE

## 2020-09-14 RX ORDER — METHYLPREDNISOLONE 4 MG/1
TABLET ORAL
Qty: 1 KIT | Refills: 0 | Status: SHIPPED | OUTPATIENT
Start: 2020-09-14 | End: 2021-01-11

## 2020-09-14 NOTE — PROGRESS NOTES
HPI:    Patient ID: Camila Saxena is a 50year old male. HPI  Patient is here complaining of pain in his left knee. Started 2 weeks ago. He was in his house and he was going for the window and he struck his left knee on the bed.   No immediate pain History   Problem Relation Age of Onset   • Heart Disease Father         premature CAD (cause of death)   • Lipids Mother         hyperlipidemia   • Stroke Paternal Grandmother         CVA   • Diabetes Other         family h/o   • Stroke Other         fami Psychiatric: He has a normal mood and affect.        Wt Readings from Last 6 Encounters:  09/14/20 : 146 lb (66.2 kg)  08/04/20 : 145 lb (65.8 kg)  07/11/20 : 146 lb 1.6 oz (66.3 kg)  12/23/19 : 146 lb 6.4 oz (66.4 kg)  07/16/19 : 141 lb (64 kg)  07/13/19

## 2020-09-22 ENCOUNTER — TELEPHONE (OUTPATIENT)
Dept: INTERNAL MEDICINE CLINIC | Facility: CLINIC | Age: 55
End: 2020-09-22

## 2020-09-22 ENCOUNTER — PATIENT MESSAGE (OUTPATIENT)
Dept: INTERNAL MEDICINE CLINIC | Facility: CLINIC | Age: 55
End: 2020-09-22

## 2020-09-22 NOTE — TELEPHONE ENCOUNTER
Spoke with patient regarding below MyChart message. Patient indicates pain is substantially better, only hurts when he kneels on it. However, swelling persists. He has finished the prednisone. Asking if he should just continue to give it more time?

## 2020-09-22 NOTE — TELEPHONE ENCOUNTER
----- Message from Via Doroteo Gonzalez. Peeroo sent at 9/22/2020  8:09 AM CDT -----  Regarding: Non-Urgent Medical Question  Contact: [phone]  Good morning,   There's 1 week ago I came for an office visit because my knee was swollen.  I took the medication that

## 2020-09-23 NOTE — TELEPHONE ENCOUNTER
Pt called back and was informed of Dr. Gucci Aleman message below and pt verbalized understanding. Thanks

## 2020-11-20 ENCOUNTER — OFFICE VISIT (OUTPATIENT)
Dept: SURGERY | Facility: CLINIC | Age: 55
End: 2020-11-20
Payer: COMMERCIAL

## 2020-11-20 ENCOUNTER — TELEPHONE (OUTPATIENT)
Dept: SURGERY | Facility: CLINIC | Age: 55
End: 2020-11-20

## 2020-11-20 VITALS — BODY MASS INDEX: 21 KG/M2 | WEIGHT: 146 LBS

## 2020-11-20 DIAGNOSIS — L72.3 SEBACEOUS CYST: Primary | ICD-10-CM

## 2020-11-20 DIAGNOSIS — L72.0 EPIDERMAL INCLUSION CYST: ICD-10-CM

## 2020-11-20 PROCEDURE — 99244 OFF/OP CNSLTJ NEW/EST MOD 40: CPT | Performed by: SURGERY

## 2020-11-20 PROCEDURE — 99072 ADDL SUPL MATRL&STAF TM PHE: CPT | Performed by: SURGERY

## 2020-11-20 NOTE — TELEPHONE ENCOUNTER
Per pt waning to know if anesthesia will be local. Pt also wanting to reschedule surgery.  Please advise

## 2020-11-20 NOTE — TELEPHONE ENCOUNTER
Dr. Abbey Acosta,  Mr. Fito Irving is scheduled for a surgical procedure, a cyst removal from his back, on 12/8/2020. We are seeking your recommendations on holding his Plavix prior to surgery, and his resuming after surgery. Thank you.

## 2020-11-21 NOTE — H&P
History and Physical      Audelia Corona is a 50year old male. HPI   Patient presents with:  Cyst: Large cyst on lower back. Patient states he has had for 15 years, it has grown over the years.   It is reddened, and is bothersome to the patient, es status:       Spouse name: Not on file      Number of children: 2      Years of education: Not on file      Highest education level: Not on file    Occupational History      Occupation: DATA  ANALYST        Employer: [employer]    Tobacco Use      Shukri diagnosis)  Epidermal inclusion cyst    50year old male with an enlarging and mildly symptomatic large skin cyst of the L lower back. Discussed in detail with patient and options reviewed, literature provided.   Plan ex bx under local - he understands ris

## 2020-11-23 ENCOUNTER — TELEPHONE (OUTPATIENT)
Dept: SURGERY | Facility: CLINIC | Age: 55
End: 2020-11-23

## 2020-11-23 ENCOUNTER — PATIENT MESSAGE (OUTPATIENT)
Dept: SURGERY | Facility: CLINIC | Age: 55
End: 2020-11-23

## 2020-11-23 ENCOUNTER — PATIENT MESSAGE (OUTPATIENT)
Dept: CARDIOLOGY CLINIC | Facility: CLINIC | Age: 55
End: 2020-11-23

## 2020-11-23 NOTE — TELEPHONE ENCOUNTER
Pt called stating pt is scheduled for surgery on 12-8-20. Pt wants to rescheduled. Is 12-4-20 or 12-11-20 available.    Call pt

## 2020-11-23 NOTE — TELEPHONE ENCOUNTER
Per pt sent [facility] msg:     I came for a visit on11/20 regarding my cyst on my lower back. Since then the cyst is sore and start to peel by itself. Please let me know what I need to do? Thank you.

## 2020-11-24 NOTE — TELEPHONE ENCOUNTER
Per pt requesting to speak to Sherryle Sergeant in regards to what kind of testing pt will need for surgery.  Please advise

## 2020-11-24 NOTE — TELEPHONE ENCOUNTER
Pt. States that he is having surgery on 12/3/2020, so pt. Wants to know when should he stop taking his blood thinners and other med?

## 2020-12-01 ENCOUNTER — TELEPHONE (OUTPATIENT)
Dept: SURGERY | Facility: CLINIC | Age: 55
End: 2020-12-01

## 2020-12-01 DIAGNOSIS — L72.3 SEBACEOUS CYST: Primary | ICD-10-CM

## 2020-12-01 RX ORDER — ATORVASTATIN CALCIUM 20 MG/1
TABLET, FILM COATED ORAL
Qty: 90 TABLET | Refills: 1 | Status: SHIPPED | OUTPATIENT
Start: 2020-12-01 | End: 2021-01-11

## 2020-12-01 NOTE — TELEPHONE ENCOUNTER
Surgery rescheduled for 12/10/2020 at [facility] due to patient's work schedule. Entered in [facility].

## 2020-12-03 ENCOUNTER — TELEPHONE (OUTPATIENT)
Dept: SURGERY | Facility: CLINIC | Age: 55
End: 2020-12-03

## 2020-12-03 NOTE — TELEPHONE ENCOUNTER
Spoke with patient, reassuring him his surgery is scheduled at the [facility] on 12/10/2020.

## 2020-12-07 ENCOUNTER — LAB REQUISITION (OUTPATIENT)
Dept: LAB | Facility: HOSPITAL | Age: 55
End: 2020-12-07
Payer: COMMERCIAL

## 2020-12-07 DIAGNOSIS — Z01.818 ENCOUNTER FOR OTHER PREPROCEDURAL EXAMINATION: ICD-10-CM

## 2020-12-08 NOTE — TELEPHONE ENCOUNTER
JK: Is there an [address] [place] [address] ENT who specializes in tinnitus or do you advise pt see Dr. Tien Rae at [facility]?   Follow Up:  leukocytosis    Interval History: pt afebrile, now plan for cystoscopy and stent    ROS:    Unobtainable because of mental status            Allergies  Bactrim DS (Rash)        ANTIMICROBIALS:  meropenem  IVPB 1000 every 8 hours      OTHER MEDS:  albuterol/ipratropium for Nebulization 3 milliLiter(s) Nebulizer every 6 hours  artificial  tears Solution 1 Drop(s) Both EYES every 6 hours  chlorhexidine 0.12% Liquid 15 milliLiter(s) Oral Mucosa every 12 hours  chlorhexidine 4% Liquid 1 Application(s) Topical <User Schedule>  diazepam   Solution 1.5 milliGRAM(s) Enteral Tube <User Schedule>  diazepam   Solution 2.5 milliGRAM(s) Enteral Tube <User Schedule>  erythromycin   Ointment 1 Application(s) Both EYES <User Schedule>  heparin   Injectable 5000 Unit(s) SubCutaneous every 12 hours  influenza   Vaccine 0.5 milliLiter(s) IntraMuscular once  multivitamin 1 Tablet(s) Oral daily  ofloxacin 0.3% Solution 1 Drop(s) Both EYES four times a day  petrolatum Ophthalmic Ointment 1 Application(s) Both EYES <User Schedule>  polyethylene glycol 3350 17 Gram(s) Oral two times a day  potassium phosphate / sodium phosphate Tablet (K-PHOS No. 2) 1 Tablet(s) Oral every 8 hours  riluzole 50 milliGRAM(s) Oral two times a day  senna 1 Tablet(s) Oral daily  sertraline 50 milliGRAM(s) Oral daily  sodium chloride 0.9%. 1000 milliLiter(s) IV Continuous <Continuous>      Vital Signs Last 24 Hrs  T(C): 36.7 (08 Dec 2020 13:49), Max: 36.8 (07 Dec 2020 18:00)  T(F): 98 (08 Dec 2020 13:49), Max: 98.2 (07 Dec 2020 18:00)  HR: 92 (08 Dec 2020 13:49) (90 - 111)  BP: 135/69 (08 Dec 2020 13:49) (110/75 - 142/74)  BP(mean): --  RR: 14 (08 Dec 2020 13:49) (14 - 18)  SpO2: 99% (08 Dec 2020 13:49) (96% - 100%)    Physical Exam:  General:    NAD  ENT:   trach, no significant secretions  Cardio:     regular S1, S2  Respiratory:    clear b/l,    no wheezing  abd:     soft,   BS +,   PEG  :     no  carter  Musculoskeletal:   no joint swelling  vascular: no phlebitis  Skin:    no rash, no decubitus                             11.5   11.36 )-----------( 575      ( 08 Dec 2020 07:40 )             37.2       12-08    139  |  103  |  21  ----------------------------<  107<H>  4.5   |  22  |  <0.30<L>    Ca    9.7      08 Dec 2020 09:16  Phos  2.1     12-08  Mg     2.2     12-08            MICROBIOLOGY:  v  .Urine Catheterized  12-01-20   No growth  --  --      .Blood Blood-Peripheral  11-30-20   No Growth Final  --  --      .Sputum Sputum  11-30-20   Moderate Stenotrophomonas maltophilia  Normal Respiratory Kia present  --  Stenotrophomonas maltophilia      .Blood Blood  11-25-20   No Growth Final  --  --      .Sputum Sputum  11-23-20   Normal Respiratory Kia present  --    Rare polymorphonuclear leukocytes per low power field  Rare Squamous epithelial cells per low power field  Few Gram Variable Rods per oil power field      .Blood Blood-Peripheral  11-22-20   Growth in aerobic and anaerobic bottles: Morganella morganii  See previous culture [id number]  --  Blood Culture PCR  Morganella morganii  Morganella morganii      .Urine Clean Catch (Midstream)  11-22-20   <10,000 CFU/mL Normal Urogenital Kia  --  --                RADIOLOGY:  Images independently visualized and reviewed personally, findings as below  < from: CT Abdomen and Pelvis w/ Oral Cont and w/ IV Cont (11.30.20 @ 20:16) >  IMPRESSION:  Left lower lobe collapse. Superimposed infection is not excluded.    2 mm left ureteral calculus and multiple nonobstructive subcentimeter left renal calculi. Associated mild hydronephrosis and urothelial enhancement suggests inflammation/infection.    Cholelithiasis.

## 2020-12-10 ENCOUNTER — LAB REQUISITION (OUTPATIENT)
Dept: LAB | Facility: HOSPITAL | Age: 55
End: 2020-12-10
Payer: COMMERCIAL

## 2020-12-10 DIAGNOSIS — L72.0 EPIDERMAL CYST: ICD-10-CM

## 2020-12-10 PROCEDURE — 88304 TISSUE EXAM BY PATHOLOGIST: CPT | Performed by: SURGERY

## 2020-12-11 ENCOUNTER — TELEPHONE (OUTPATIENT)
Dept: SURGERY | Facility: CLINIC | Age: 55
End: 2020-12-11

## 2020-12-11 ENCOUNTER — PATIENT MESSAGE (OUTPATIENT)
Dept: CARDIOLOGY CLINIC | Facility: CLINIC | Age: 55
End: 2020-12-11

## 2020-12-11 NOTE — TELEPHONE ENCOUNTER
Pt. States that he had surgery done yesterday with Dr Conner Kellogg, and he wants to know if he is able to start taking his medications again. Pt. States that he is bleeding. Pt. States that he did contact General Surgery, and they referred the pt.  To our office

## 2020-12-11 NOTE — TELEPHONE ENCOUNTER
Per pt had procedure yesterday, states he is still having bleeding, requesting to speak to RN. Please call thank you.

## 2020-12-11 NOTE — TELEPHONE ENCOUNTER
Spoke with patient, advising him to apply a pressure dressing over the surgical dressing, not to remove the surgical dressing, to apply an ice pack to the area, and to take Motrin for pain. Patient verbally agreed.

## 2020-12-23 ENCOUNTER — OFFICE VISIT (OUTPATIENT)
Dept: SURGERY | Facility: CLINIC | Age: 55
End: 2020-12-23
Payer: COMMERCIAL

## 2020-12-23 DIAGNOSIS — L72.0 EPIDERMAL INCLUSION CYST: Primary | ICD-10-CM

## 2020-12-23 PROCEDURE — 99024 POSTOP FOLLOW-UP VISIT: CPT | Performed by: SURGERY

## 2020-12-24 NOTE — PROGRESS NOTES
Postoperative Patient Follow-up      12/23/2020    Mita Jarvis LEI Peer 50year old      HPI  Patient presents with:  Post-Op: S/p excisional biopsy left trunk lower back skin cyst (6.5 x 3.7 cm).  Pt states pain is better, now pain level is 3/10, states still

## 2020-12-30 ENCOUNTER — NURSE ONLY (OUTPATIENT)
Dept: SURGERY | Facility: CLINIC | Age: 55
End: 2020-12-30
Payer: COMMERCIAL

## 2020-12-30 DIAGNOSIS — Z98.890 HISTORY OF REMOVAL OF CYST: ICD-10-CM

## 2020-12-30 PROCEDURE — A6216 NON-STERILE GAUZE<=16 SQ IN: HCPCS | Performed by: SURGERY

## 2021-01-06 RX ORDER — LISINOPRIL 10 MG/1
TABLET ORAL
Qty: 30 TABLET | Refills: 5 | Status: SHIPPED | OUTPATIENT
Start: 2021-01-06 | End: 2021-01-11

## 2021-01-08 ENCOUNTER — OFFICE VISIT (OUTPATIENT)
Dept: SURGERY | Facility: CLINIC | Age: 56
End: 2021-01-08
Payer: COMMERCIAL

## 2021-01-08 VITALS — WEIGHT: 146 LBS | BODY MASS INDEX: 21 KG/M2

## 2021-01-08 DIAGNOSIS — L72.0 EPIDERMAL INCLUSION CYST: Primary | ICD-10-CM

## 2021-01-08 PROCEDURE — 99024 POSTOP FOLLOW-UP VISIT: CPT | Performed by: SURGERY

## 2021-01-08 NOTE — PROGRESS NOTES
Postoperative Patient Follow-up      1/8/2021    Chari Mart 50year old      HPI  Patient presents with: Follow - Up: S/P excisional biopsy left trunk lower skin cyst.  Denies pain, fevers.   Dressing removed, patient states he changes dressings twic

## 2021-01-11 ENCOUNTER — OFFICE VISIT (OUTPATIENT)
Dept: INTERNAL MEDICINE CLINIC | Facility: CLINIC | Age: 56
End: 2021-01-11
Payer: COMMERCIAL

## 2021-01-11 VITALS
BODY MASS INDEX: 20.9 KG/M2 | DIASTOLIC BLOOD PRESSURE: 80 MMHG | HEIGHT: 70 IN | WEIGHT: 146 LBS | SYSTOLIC BLOOD PRESSURE: 130 MMHG | HEART RATE: 68 BPM | RESPIRATION RATE: 18 BRPM

## 2021-01-11 DIAGNOSIS — I10 ESSENTIAL HYPERTENSION: ICD-10-CM

## 2021-01-11 DIAGNOSIS — I25.10 CORONARY ARTERY DISEASE INVOLVING NATIVE CORONARY ARTERY OF NATIVE HEART WITHOUT ANGINA PECTORIS: ICD-10-CM

## 2021-01-11 DIAGNOSIS — F41.9 ANXIETY: ICD-10-CM

## 2021-01-11 DIAGNOSIS — E78.5 HYPERLIPIDEMIA, UNSPECIFIED HYPERLIPIDEMIA TYPE: ICD-10-CM

## 2021-01-11 DIAGNOSIS — Z00.00 ROUTINE PHYSICAL EXAMINATION: Primary | ICD-10-CM

## 2021-01-11 DIAGNOSIS — H93.19 TINNITUS, UNSPECIFIED LATERALITY: ICD-10-CM

## 2021-01-11 PROCEDURE — 99396 PREV VISIT EST AGE 40-64: CPT | Performed by: INTERNAL MEDICINE

## 2021-01-11 PROCEDURE — 3008F BODY MASS INDEX DOCD: CPT | Performed by: INTERNAL MEDICINE

## 2021-01-11 PROCEDURE — 3079F DIAST BP 80-89 MM HG: CPT | Performed by: INTERNAL MEDICINE

## 2021-01-11 PROCEDURE — 3075F SYST BP GE 130 - 139MM HG: CPT | Performed by: INTERNAL MEDICINE

## 2021-01-11 RX ORDER — ATORVASTATIN CALCIUM 20 MG/1
TABLET, FILM COATED ORAL
Qty: 30 TABLET | Refills: 5 | Status: SHIPPED | OUTPATIENT
Start: 2021-01-11 | End: 2021-08-10

## 2021-01-11 RX ORDER — LISINOPRIL 10 MG/1
10 TABLET ORAL DAILY
Qty: 30 TABLET | Refills: 5 | Status: SHIPPED | OUTPATIENT
Start: 2021-01-11 | End: 2022-01-26

## 2021-01-11 RX ORDER — CLOPIDOGREL BISULFATE 75 MG/1
75 TABLET ORAL DAILY
Qty: 30 TABLET | Refills: 5 | Status: SHIPPED | OUTPATIENT
Start: 2021-01-11 | End: 2021-08-02

## 2021-01-13 ENCOUNTER — PATIENT MESSAGE (OUTPATIENT)
Dept: INTERNAL MEDICINE CLINIC | Facility: CLINIC | Age: 56
End: 2021-01-13

## 2021-01-13 ENCOUNTER — TELEPHONE (OUTPATIENT)
Dept: INTERNAL MEDICINE CLINIC | Facility: CLINIC | Age: 56
End: 2021-01-13

## 2021-01-13 NOTE — TELEPHONE ENCOUNTER
MyChart sent-need clarification. =per record it is under [facility], will need fax number and can fax the orders.      From: Audelia Corona  To: Angela Faria MD  Sent: 1/13/2021 11:06 AM CST  Subject: Non-Urgent Medical Question    Porfirio Preciado

## 2021-01-13 NOTE — TELEPHONE ENCOUNTER
Patient called back and states he needs his lab orders changed to [facility] labs. Lab orders from 1/11/21 changed to [facility] labs. Patient notified.

## 2021-01-13 NOTE — TELEPHONE ENCOUNTER
Dominique Alvarado, [facility] sent to Giuseppe Lou MD             Hi Dr. Marcos Feliciano,     I received your navigation order for behavioral health services. I spoke with your patient and think that he would benefit from counseling services.  I provided him with referr

## 2021-01-17 NOTE — PROGRESS NOTES
HPI:    Patient ID: Jorge A Ornelas is a 50year old male. HPI  Patient is here requesting a general physical exam and follow-up on chronic medical problems as listed below. Overall he is doing well. Recently had a cyst removed by the surgeon.   He is hyperlipidemia   • Stroke Paternal Grandmother         CVA   • Diabetes Other         family h/o   • Stroke Other         family h/o CVA   • Stroke Cousin         CVA   • Glaucoma Neg    • Macular degeneration Neg       Social History    Tobacco Use Patient Position: Sitting, Cuff Size: large)   Pulse 68   Resp 18   Ht 5' 10\" (1.778 m)   Wt 146 lb (66.2 kg)   BMI 20.95 kg/m²      Physical Exam    Constitutional: He appears well-developed and well-nourished.    HENT:   Right Ear: Tympanic membrane and work and contact patient with results. Encouraged to work a little harder on diet and exercise. Maintain healthy body weight. He seems increasingly stressed by his life situations. Discussed various options.   - CBC WITH DIFFERENTIAL WITH PLATELET; Joy

## 2021-01-18 ENCOUNTER — OFFICE VISIT (OUTPATIENT)
Dept: PODIATRY CLINIC | Facility: CLINIC | Age: 56
End: 2021-01-18
Payer: COMMERCIAL

## 2021-01-18 ENCOUNTER — TELEPHONE (OUTPATIENT)
Dept: SURGERY | Facility: CLINIC | Age: 56
End: 2021-01-18

## 2021-01-18 DIAGNOSIS — B35.1 ONYCHOMYCOSIS: Primary | ICD-10-CM

## 2021-01-18 PROCEDURE — 99213 OFFICE O/P EST LOW 20 MIN: CPT | Performed by: PODIATRIST

## 2021-01-18 NOTE — TELEPHONE ENCOUNTER
Per pt removed dressing and states are still stitches in the wound. Pt asking if he can remove them himself or if he can come in today to have them removed.  Please advise

## 2021-01-18 NOTE — PROGRESS NOTES
Ophelia Pathak is a 50year old male.  Patient presents with:  Toenail Fungus: 5 mo f/u - states the toenails look better - no other c/o          HPI:   Patient returns to the clinic for checkup on his nails the left hallux nail especially when she stands death)   • Lipids Mother         hyperlipidemia   • Stroke Paternal Grandmother         CVA   • Diabetes Other         family h/o   • Stroke Other         family h/o CVA   • Stroke Cousin         CVA   • Glaucoma Neg    • Macular degeneration Neg       Soc it for up to a year and I will follow-up with him again in 3 to 4 months which will probably be the end-stage of the topical treatment. The patient indicates understanding of these issues and agrees to the plan.     Devin Gee DPM

## 2021-01-20 ENCOUNTER — OFFICE VISIT (OUTPATIENT)
Dept: SURGERY | Facility: CLINIC | Age: 56
End: 2021-01-20
Payer: COMMERCIAL

## 2021-01-20 DIAGNOSIS — L72.0 EPIDERMAL INCLUSION CYST: Primary | ICD-10-CM

## 2021-01-20 PROCEDURE — 99024 POSTOP FOLLOW-UP VISIT: CPT | Performed by: SURGERY

## 2021-01-21 NOTE — PROGRESS NOTES
Postoperative Patient Follow-up      1/20/2021    Bettye Mart 50year old      HPI  Patient presents with:  Post-Op: S/p excisional biopsy left trunk lower back skin cyst (6.5 x 3.7 cm) on 12/10/20.  Pt states he thinks there is a stitch that needs to

## 2021-01-27 LAB
ABSOLUTE BASOPHILS: 31 CELLS/UL (ref 0–200)
ABSOLUTE EOSINOPHILS: 70 CELLS/UL (ref 15–500)
ABSOLUTE LYMPHOCYTES: 1053 CELLS/UL (ref 850–3900)
ABSOLUTE MONOCYTES: 316 CELLS/UL (ref 200–950)
ABSOLUTE NEUTROPHILS: 2430 CELLS/UL (ref 1500–7800)
ALBUMIN/GLOBULIN RATIO: 1.6 (CALC) (ref 1–2.5)
ALBUMIN: 4 G/DL (ref 3.6–5.1)
ALKALINE PHOSPHATASE: 63 U/L (ref 35–144)
ALT: 17 U/L (ref 9–46)
AST: 17 U/L (ref 10–35)
BASOPHILS: 0.8 %
BILIRUBIN, TOTAL: 0.5 MG/DL (ref 0.2–1.2)
BUN: 18 MG/DL (ref 7–25)
CALCIUM: 9 MG/DL (ref 8.6–10.3)
CARBON DIOXIDE: 29 MMOL/L (ref 20–32)
CHLORIDE: 104 MMOL/L (ref 98–110)
CHOL/HDLC RATIO: 3.4 (CALC)
CHOLESTEROL, TOTAL: 122 MG/DL
CREATININE: 0.89 MG/DL (ref 0.7–1.33)
EGFR IF AFRICN AM: 113 ML/MIN/1.73M2
EGFR IF NONAFRICN AM: 98 ML/MIN/1.73M2
EOSINOPHILS: 1.8 %
GLOBULIN: 2.5 G/DL (CALC) (ref 1.9–3.7)
GLUCOSE: 104 MG/DL (ref 65–99)
HDL CHOLESTEROL: 36 MG/DL
HEMATOCRIT: 42.9 % (ref 38.5–50)
HEMOGLOBIN: 13.9 G/DL (ref 13.2–17.1)
LDL-CHOLESTEROL: 70 MG/DL (CALC)
LYMPHOCYTES: 27 %
MCH: 29.6 PG (ref 27–33)
MCHC: 32.4 G/DL (ref 32–36)
MCV: 91.5 FL (ref 80–100)
MONOCYTES: 8.1 %
MPV: 10.6 FL (ref 7.5–12.5)
NEUTROPHILS: 62.3 %
NON-HDL CHOLESTEROL: 86 MG/DL (CALC)
PLATELET COUNT: 224 THOUSAND/UL (ref 140–400)
POTASSIUM: 4.3 MMOL/L (ref 3.5–5.3)
PROTEIN, TOTAL: 6.5 G/DL (ref 6.1–8.1)
PSA, TOTAL: 0.5 NG/ML
RDW: 12.4 % (ref 11–15)
RED BLOOD CELL COUNT: 4.69 MILLION/UL (ref 4.2–5.8)
SODIUM: 140 MMOL/L (ref 135–146)
TRIGLYCERIDES: 84 MG/DL
TSH W/REFLEX TO FT4: 1.37 MIU/L (ref 0.4–4.5)
WHITE BLOOD CELL COUNT: 3.9 THOUSAND/UL (ref 3.8–10.8)

## 2021-02-01 ENCOUNTER — PATIENT MESSAGE (OUTPATIENT)
Dept: INTERNAL MEDICINE CLINIC | Facility: CLINIC | Age: 56
End: 2021-02-01

## 2021-02-01 NOTE — TELEPHONE ENCOUNTER
From: Camacho Arnold  To: Berhane Hensley MD  Sent: 2/1/2021 7:06 AM CST  Subject: Test Results Question    Good morning,   Since my glucose blood sugar is 104.  Should I do the Hgb-A1-c test ?    Thank you  [facility]

## 2021-03-09 ENCOUNTER — PATIENT MESSAGE (OUTPATIENT)
Dept: INTERNAL MEDICINE CLINIC | Facility: CLINIC | Age: 56
End: 2021-03-09

## 2021-03-10 ENCOUNTER — TELEPHONE (OUTPATIENT)
Dept: INTERNAL MEDICINE CLINIC | Facility: CLINIC | Age: 56
End: 2021-03-10

## 2021-03-10 NOTE — TELEPHONE ENCOUNTER
From: Luba Posada  To: Yanna Valdez MD  Sent: 3/9/2021 7:44 PM CST  Subject: Non-Urgent Medical Question    Hi,  I feel my ears feel blocked and I feel congested. Is there medication that I can take.  Please advise

## 2021-03-10 NOTE — TELEPHONE ENCOUNTER
----- Message from Via Doroteo Gonzalez. Peeroo sent at 3/9/2021  7:44 PM CST -----  Regarding: Non-Urgent Medical Question  Contact: [phone]  Hi,  I feel my ears feel blocked and I feel congested. Is there medication that I can take.  Please advise

## 2021-03-10 NOTE — TELEPHONE ENCOUNTER
Spoke with patient RE: Pa message below--reports left ear feels blocked and stuffy, also sinus pain and pressure. Patient has not tried anything because he is on blood pressure medication.     Patient denies fever, nausea, vomiting, diarrhea, chest courtney

## 2021-03-11 NOTE — TELEPHONE ENCOUNTER
Spoke with patient ( verified) and relayed Dr. Eleanor Babb message below--patient verbalizes understanding and agreement. PCP recommendations sent to patient via [employer] per his request. No further questions/concerns at this time.

## 2021-04-09 ENCOUNTER — PATIENT MESSAGE (OUTPATIENT)
Dept: CARDIOLOGY CLINIC | Facility: CLINIC | Age: 56
End: 2021-04-09

## 2021-04-09 ENCOUNTER — PATIENT MESSAGE (OUTPATIENT)
Dept: INTERNAL MEDICINE CLINIC | Facility: CLINIC | Age: 56
End: 2021-04-09

## 2021-04-09 NOTE — TELEPHONE ENCOUNTER
From: Severa Balls  To: Amber Solitario MD  Sent: 4/9/2021 10:06 AM CDT  Subject: Non-Urgent Medical Question    Good Morning,   I received a notification on my Chart that I am eligible for covid vaccine.  I am concerned about taking the vaccine since

## 2021-04-09 NOTE — TELEPHONE ENCOUNTER
From: Luba Posada  To: Yanna Valdez MD  Sent: 4/9/2021 10:06 AM CDT  Subject: Non-Urgent Medical Question    Good Morning,   I received a notification on my Chart that I am eligible for covid vaccine.  I am concerned about taking the vaccine since I

## 2021-04-12 ENCOUNTER — OFFICE VISIT (OUTPATIENT)
Dept: INTERNAL MEDICINE CLINIC | Facility: CLINIC | Age: 56
End: 2021-04-12
Payer: COMMERCIAL

## 2021-04-12 ENCOUNTER — TELEPHONE (OUTPATIENT)
Dept: INTERNAL MEDICINE CLINIC | Facility: CLINIC | Age: 56
End: 2021-04-12

## 2021-04-12 VITALS
SYSTOLIC BLOOD PRESSURE: 120 MMHG | DIASTOLIC BLOOD PRESSURE: 84 MMHG | BODY MASS INDEX: 20.76 KG/M2 | WEIGHT: 145 LBS | RESPIRATION RATE: 14 BRPM | OXYGEN SATURATION: 96 % | HEART RATE: 86 BPM | HEIGHT: 70 IN

## 2021-04-12 DIAGNOSIS — M54.9 UPPER BACK PAIN: ICD-10-CM

## 2021-04-12 DIAGNOSIS — K21.9 GASTROESOPHAGEAL REFLUX DISEASE WITHOUT ESOPHAGITIS: Primary | ICD-10-CM

## 2021-04-12 PROCEDURE — 3074F SYST BP LT 130 MM HG: CPT | Performed by: INTERNAL MEDICINE

## 2021-04-12 PROCEDURE — 99214 OFFICE O/P EST MOD 30 MIN: CPT | Performed by: INTERNAL MEDICINE

## 2021-04-12 PROCEDURE — 3008F BODY MASS INDEX DOCD: CPT | Performed by: INTERNAL MEDICINE

## 2021-04-12 PROCEDURE — 3079F DIAST BP 80-89 MM HG: CPT | Performed by: INTERNAL MEDICINE

## 2021-04-12 RX ORDER — CYCLOBENZAPRINE HCL 10 MG
10 TABLET ORAL NIGHTLY
Qty: 10 TABLET | Refills: 0 | Status: SHIPPED | OUTPATIENT
Start: 2021-04-12 | End: 2021-04-22

## 2021-04-12 RX ORDER — PANTOPRAZOLE SODIUM 40 MG/1
40 TABLET, DELAYED RELEASE ORAL
Qty: 15 TABLET | Refills: 0 | Status: SHIPPED | OUTPATIENT
Start: 2021-04-12 | End: 2021-08-14

## 2021-04-12 NOTE — TELEPHONE ENCOUNTER
Pt states he has appt scheduled with Dr. Ashley Nolasco Thursday 4/15/21 and would like to schedule a sooner appt, today or tomorrow. Per pt request appt scheduled today with Dr. Ashley Nolasco, informed pt of office location and screening process.

## 2021-04-12 NOTE — TELEPHONE ENCOUNTER
Pt called with further questions about vaccine. He did also speak to his PCP but would like to speak to Dr. Adeline Malik. Please call.

## 2021-04-12 NOTE — PROGRESS NOTES
Yogi Hanson is a 50year old male. Patient presents with:  Arm Pain: Left arm pain when lifting arm   Back Pain: Upper back     HPI:   53-year-old gentleman with a past medical history of coronary disease here for evaluation of upper back pain, betito Performed by Jenni Vickers MD at [facility] ENDOSCOPY   • CYST REMOVAL Left 12/10/2020    back   • [facility]   • SKIN SURGERY Left 12/10/2020    epidermal inclusion cyst, lower back      Social History:  Social History    Tobacc regular rhythm. Heart sounds: Normal heart sounds. No murmur heard. Pulmonary:      Effort: Pulmonary effort is normal.      Breath sounds: Normal breath sounds. No rhonchi or rales.    Abdominal:      General: Bowel sounds are normal.      Palpati indicates understanding of these issues and agrees to the plan. No follow-ups on file.

## 2021-04-12 NOTE — TELEPHONE ENCOUNTER
Left shoulder pain for several weeks. Pain is intermittent and now in the left arm. Pain worsens when lifting weights. Denies any chest pain. \"just arm pain\". Denies any injury or fall.  Patient asking for an office with any available provider on Thurs

## 2021-04-13 ENCOUNTER — PATIENT MESSAGE (OUTPATIENT)
Dept: CARDIOLOGY CLINIC | Facility: CLINIC | Age: 56
End: 2021-04-13

## 2021-04-13 NOTE — TELEPHONE ENCOUNTER
From: Luba Posada  To: Miranda Harvey MD  Sent: 4/13/2021 7:19 AM CDT  Subject: Non-Urgent Medical Question    Hi! Thank you for your reply. Does Dr. Antonio Small have a preference for Luis Crabtree or Millie Hale for cardiac patients? Thanks!

## 2021-04-17 ENCOUNTER — IMMUNIZATION (OUTPATIENT)
Dept: LAB | Age: 56
End: 2021-04-17
Attending: HOSPITALIST
Payer: COMMERCIAL

## 2021-04-17 DIAGNOSIS — Z23 NEED FOR VACCINATION: Primary | ICD-10-CM

## 2021-04-17 PROCEDURE — 0001A SARSCOV2 VAC 30MCG/0.3ML IM: CPT

## 2021-04-25 ENCOUNTER — PATIENT MESSAGE (OUTPATIENT)
Dept: INTERNAL MEDICINE CLINIC | Facility: CLINIC | Age: 56
End: 2021-04-25

## 2021-04-25 ENCOUNTER — TELEPHONE (OUTPATIENT)
Dept: INTERNAL MEDICINE CLINIC | Facility: CLINIC | Age: 56
End: 2021-04-25

## 2021-04-25 NOTE — TELEPHONE ENCOUNTER
Shayna Mart  to Marianne Smith MD        4/25/21 6:23 PM  Hi,  I went to see Greer Velazquez there's 10 days ago since I was having gas and chest pain. He gave me medication and the pain went away. However my upper back and my upper left arm is hurting.  I

## 2021-04-25 NOTE — TELEPHONE ENCOUNTER
From: Luba Posada  To: Yanna Valdez MD  Sent: 4/25/2021 6:23 PM CDT  Subject: Non-Urgent Medical Question    Hi,  I went to see Nat Sullivan there's 10 days ago since I was having gas and chest pain. He gave me medication and the pain went away.  However

## 2021-04-28 NOTE — TELEPHONE ENCOUNTER
Multiple attempts and messages left as well as the patient has read his mychart to call us:    Y 4/25/2021  6:31 PM MARISOL Loera Mom  RE: Non-Urgent Medical Question   Y 4/25/2021  6:23 PM May Morrow MD  Non-Urge

## 2021-05-04 ENCOUNTER — OFFICE VISIT (OUTPATIENT)
Dept: PODIATRY CLINIC | Facility: CLINIC | Age: 56
End: 2021-05-04
Payer: COMMERCIAL

## 2021-05-04 DIAGNOSIS — B35.1 ONYCHOMYCOSIS: Primary | ICD-10-CM

## 2021-05-04 PROCEDURE — 99213 OFFICE O/P EST LOW 20 MIN: CPT | Performed by: PODIATRIST

## 2021-05-04 NOTE — PROGRESS NOTES
[facility]  Progress Note    Aaron Oneil is a 50year old male. Patient presents with:  Consult: left hallux toenail thickness and discoloration- He states he first noticed 3-4 yrs ago.  Pt states he has been seeing Dr. Wilbert Purvis and he has Disease Father         premature CAD (cause of death)   • Lipids Mother         hyperlipidemia   • Stroke Paternal Grandmother         CVA   • Diabetes Other         family h/o   • Stroke Other         family h/o CVA   • Stroke Cousin         CVA   • Leona Khan 01/26/2021    BUN 18 01/26/2021    CREATSERUM 0.89 01/26/2021    BUNCREA 18.0 05/16/2019    ANIONGAP 6 05/16/2019    GFRAA 113 01/26/2021    GFRNAA 98 01/26/2021    CA 9.0 01/26/2021     01/26/2021    K 4.3 01/26/2021     01/26/2021    CO2 29 0

## 2021-05-08 ENCOUNTER — IMMUNIZATION (OUTPATIENT)
Dept: LAB | Age: 56
End: 2021-05-08
Attending: HOSPITALIST
Payer: COMMERCIAL

## 2021-05-08 DIAGNOSIS — Z23 NEED FOR VACCINATION: Primary | ICD-10-CM

## 2021-05-08 PROCEDURE — 0002A SARSCOV2 VAC 30MCG/0.3ML IM: CPT

## 2021-05-26 ENCOUNTER — TELEPHONE (OUTPATIENT)
Dept: INTERNAL MEDICINE CLINIC | Facility: CLINIC | Age: 56
End: 2021-05-26

## 2021-05-26 NOTE — TELEPHONE ENCOUNTER
My recommendation is to start physical therapy.   However if he prefers to see Ortho, I will be happy to give him a referral.  Thank you

## 2021-05-26 NOTE — TELEPHONE ENCOUNTER
Left detailed message on identified voicemail. Advised of Dr. Edyta Miles recommendation and to call back if he would like referral for ortho doctor generated.

## 2021-05-26 NOTE — TELEPHONE ENCOUNTER
Pt states he had OV with Dr Joseph Homans 4/12/21 for arm pain. Pt states Dr Umm Perez referred him to PT. Pt states he is uncertain if he should go to PT or see Ortho.       Please advise

## 2021-06-26 ENCOUNTER — OFFICE VISIT (OUTPATIENT)
Dept: DERMATOLOGY CLINIC | Facility: CLINIC | Age: 56
End: 2021-06-26
Payer: COMMERCIAL

## 2021-06-26 DIAGNOSIS — L81.0 POST-INFLAMMATORY HYPERPIGMENTATION: ICD-10-CM

## 2021-06-26 DIAGNOSIS — L81.6 POIKILODERMA: Primary | ICD-10-CM

## 2021-06-26 PROCEDURE — 99213 OFFICE O/P EST LOW 20 MIN: CPT | Performed by: DERMATOLOGY

## 2021-06-26 RX ORDER — FLUOCINOLONE ACETONIDE, HYDROQUINONE, AND TRETINOIN .1; 40; .5 MG/G; MG/G; MG/G
1 CREAM TOPICAL NIGHTLY
Qty: 30 G | Refills: 3 | Status: SHIPPED | OUTPATIENT
Start: 2021-06-26 | End: 2022-02-01 | Stop reason: ALTCHOICE

## 2021-06-26 NOTE — PROGRESS NOTES
HPI:     Chief Complaint     Lesion        HPI     Lesion      Additional comments: LOV 10/5/2019 Patient present with flat dark itchy lesion on R arm  hip  and cheek . Paient c/o having lesions for 6 months . Patient denies any hx of sc           Last edite mouth daily.        Allergies:   No Known Allergies    Past Medical History:   Diagnosis Date   • Atherosclerosis of coronary artery 06/26/2015    Stenting of Left Circumflex and Ramus   • High blood pressure    • Hyperlipidemia      Past Surgical History:     Worried About [facility] in the Last Year:       Ran Out of Food in the Last Year:   Transportation Needs:       Lack of Transportation (Medical):       Lack of Transportation (Non-Medical):   Physical Activity:       Days of Exercise per We He will avoid after shaves on the face. He is given Tri-Audra cream to be applied at bedtime. Side effects discussed we will use a mineral-containing sunblock in the morning.   Samples given of LaRoche Posay-  Post-inflammatory hyperpigmentation-possibly p

## 2021-06-28 ENCOUNTER — TELEPHONE (OUTPATIENT)
Dept: INTERNAL MEDICINE CLINIC | Facility: CLINIC | Age: 56
End: 2021-06-28

## 2021-06-28 NOTE — TELEPHONE ENCOUNTER
----- Message from Via Doroteo Gonzalez. Peeroo sent at 6/28/2021  9:15 AM CDT -----  Regarding: Non-Urgent Medical Question  Contact: [phone]  MARISSA Sterling,  I am congested and my ears are ringing.    I know I cannot take decongestants and have been taking nos

## 2021-06-28 NOTE — TELEPHONE ENCOUNTER
Left message to call back, transfer to triage      Read Composed From To  Subject   Y 6/28/2021 10:08 AM MARISOL Acharya  RE: Non-Urgent Medical Question

## 2021-06-28 NOTE — TELEPHONE ENCOUNTER
----- Message from Via Doroteo Gonzalez. Peeroo sent at 6/28/2021  9:23 AM CDT -----  Regarding: Non-Urgent Medical Question  Contact: [phone]  Good morning,   I feel very congested and my ears been ringing a lot lately. May I know what I can take.     Thank you

## 2021-06-30 ENCOUNTER — NURSE TRIAGE (OUTPATIENT)
Dept: INTERNAL MEDICINE CLINIC | Facility: CLINIC | Age: 56
End: 2021-06-30

## 2021-06-30 ENCOUNTER — PATIENT MESSAGE (OUTPATIENT)
Dept: CARDIOLOGY CLINIC | Facility: CLINIC | Age: 56
End: 2021-06-30

## 2021-06-30 NOTE — TELEPHONE ENCOUNTER
Action Requested: Summary for Provider     []  Critical Lab, Recommendations Needed  [x] Need Additional Advice  []   FYI    []   Need Orders  [] Need Medications Sent to Pharmacy  []  Other     SUMMARY: Pt states he has nasal congestion, ringing in left e

## 2021-07-01 ENCOUNTER — ORDER TRANSCRIPTION (OUTPATIENT)
Dept: ADMINISTRATIVE | Facility: HOSPITAL | Age: 56
End: 2021-07-01

## 2021-07-01 ENCOUNTER — PATIENT MESSAGE (OUTPATIENT)
Dept: CARDIOLOGY CLINIC | Facility: CLINIC | Age: 56
End: 2021-07-01

## 2021-07-01 DIAGNOSIS — Z01.818 PREOP EXAMINATION: Primary | ICD-10-CM

## 2021-07-01 NOTE — TELEPHONE ENCOUNTER
Left detailed message with Dr. Marcella Negron advice. Advised to call if symptoms not improved or with any questions.

## 2021-07-01 NOTE — TELEPHONE ENCOUNTER
Coricidin HBP is safe to take. He can try to switch to Zyrtec 10mg once a day or some other OTC antihistamine. Just avoid the \"D\" decongestant version of the meds.  Call if not better in coming days

## 2021-07-01 NOTE — TELEPHONE ENCOUNTER
From: Anni Hernandez  To: Yakelin Mills MD  Sent: 6/30/2021 8:51 AM CDT  Subject: Non-Urgent Medical Question    Good morning,  Please let me know if I am going to have the \"stress test \" this year 2021.     Thank you  [employer]

## 2021-07-01 NOTE — TELEPHONE ENCOUNTER
From: Ophelia Pathak  To: Imer Shetty MD  Sent: 7/1/2021 10:14 AM CDT  Subject: Referral Request    Hi! I scheduled the stress test for 24th July at [facility] but insurance needs preauthorization.  Can you please let me what you need from me to get this s

## 2021-07-09 ENCOUNTER — PATIENT MESSAGE (OUTPATIENT)
Dept: CARDIOLOGY CLINIC | Facility: CLINIC | Age: 56
End: 2021-07-09

## 2021-07-09 ENCOUNTER — TELEPHONE (OUTPATIENT)
Dept: CARDIOLOGY CLINIC | Facility: CLINIC | Age: 56
End: 2021-07-09

## 2021-07-09 NOTE — TELEPHONE ENCOUNTER
Pt's Stress Echo that is scheduled for 7/25/21 has been authorized. Left pt vm and sent Authorization Letter sent via [employer].   Casey Ordaz [place] [address], [facility]

## 2021-07-09 NOTE — TELEPHONE ENCOUNTER
From: Jl Hernández  To: Ike Bragg MD  Sent: 7/9/2021 9:18 AM CDT  Subject: Referral Request    Good Morning & Happy Friday! I wanted to follow up on the preauthorization.  Yesterday, the insurance said it was not submitted and I will need to pos

## 2021-07-15 ENCOUNTER — TELEPHONE (OUTPATIENT)
Dept: CARDIOLOGY CLINIC | Facility: CLINIC | Age: 56
End: 2021-07-15

## 2021-07-15 NOTE — TELEPHONE ENCOUNTER
Left message for Doris Leiva , his stress echo was approved, authorization letter was sent to him by Coco Sinha on July 9

## 2021-07-21 ENCOUNTER — LAB ENCOUNTER (OUTPATIENT)
Dept: LAB | Facility: HOSPITAL | Age: 56
End: 2021-07-21
Attending: INTERNAL MEDICINE
Payer: COMMERCIAL

## 2021-07-21 DIAGNOSIS — Z01.818 PREOP EXAMINATION: ICD-10-CM

## 2021-07-21 LAB — SARS-COV-2 RNA RESP QL NAA+PROBE: NOT DETECTED

## 2021-07-23 ENCOUNTER — PATIENT MESSAGE (OUTPATIENT)
Dept: INTERNAL MEDICINE CLINIC | Facility: CLINIC | Age: 56
End: 2021-07-23

## 2021-07-23 ENCOUNTER — NURSE TRIAGE (OUTPATIENT)
Dept: INTERNAL MEDICINE CLINIC | Facility: CLINIC | Age: 56
End: 2021-07-23

## 2021-07-23 NOTE — TELEPHONE ENCOUNTER
Action Requested: Summary for Provider     []  Critical Lab, Recommendations Needed  [] Need Additional Advice  []   FYI    []   Need Orders  [] Need Medications Sent to Pharmacy  []  Other     SUMMARY:   Please aThe patient will proceed to a walk in clini

## 2021-07-24 ENCOUNTER — HOSPITAL ENCOUNTER (OUTPATIENT)
Dept: CV DIAGNOSTICS | Facility: HOSPITAL | Age: 56
Discharge: HOME | End: 2021-07-24
Attending: INTERNAL MEDICINE
Payer: COMMERCIAL

## 2021-07-24 DIAGNOSIS — I25.119 CORONARY ARTERY DISEASE INVOLVING NATIVE HEART WITH ANGINA PECTORIS, UNSPECIFIED VESSEL OR LESION TYPE (HCC): ICD-10-CM

## 2021-07-24 DIAGNOSIS — E78.5 HYPERLIPIDEMIA, UNSPECIFIED HYPERLIPIDEMIA TYPE: ICD-10-CM

## 2021-07-24 DIAGNOSIS — I10 ESSENTIAL HYPERTENSION: ICD-10-CM

## 2021-07-24 PROCEDURE — 93017 CV STRESS TEST TRACING ONLY: CPT | Performed by: INTERNAL MEDICINE

## 2021-07-24 PROCEDURE — 93350 STRESS TTE ONLY: CPT | Performed by: INTERNAL MEDICINE

## 2021-07-24 PROCEDURE — 93018 CV STRESS TEST I&R ONLY: CPT | Performed by: INTERNAL MEDICINE

## 2021-07-28 ENCOUNTER — PATIENT MESSAGE (OUTPATIENT)
Dept: CARDIOLOGY CLINIC | Facility: CLINIC | Age: 56
End: 2021-07-28

## 2021-07-28 NOTE — TELEPHONE ENCOUNTER
From: Hattie Sheldon  To: Alina Lyons MD  Sent: 7/28/2021 9:44 AM CDT  Subject: Visit Follow-up Question    Hi,  I went for a stress test on July24th at [facility] in [facility]. Please let me know the results.   Thank you    [employer]

## 2021-08-02 NOTE — TELEPHONE ENCOUNTER
Please review. Protocol Failed / No Protocol.     Requested Prescriptions   Pending Prescriptions Disp Refills    CLOPIDOGREL 75 MG Oral Tab [Pharmacy Med Name: CLOPIDOGREL 75 MG TABLET] 30 tablet 5     Sig: TAKE 1 TABLET BY MOUTH EVERY DAY        There is

## 2021-08-03 RX ORDER — CLOPIDOGREL BISULFATE 75 MG/1
75 TABLET ORAL DAILY
Qty: 30 TABLET | Refills: 5 | Status: SHIPPED | OUTPATIENT
Start: 2021-08-03 | End: 2022-01-31

## 2021-08-10 ENCOUNTER — OFFICE VISIT (OUTPATIENT)
Dept: CARDIOLOGY CLINIC | Facility: CLINIC | Age: 56
End: 2021-08-10
Payer: COMMERCIAL

## 2021-08-10 VITALS
WEIGHT: 139 LBS | HEIGHT: 70 IN | HEART RATE: 65 BPM | BODY MASS INDEX: 19.9 KG/M2 | DIASTOLIC BLOOD PRESSURE: 76 MMHG | SYSTOLIC BLOOD PRESSURE: 122 MMHG

## 2021-08-10 DIAGNOSIS — E78.5 HYPERLIPIDEMIA, UNSPECIFIED HYPERLIPIDEMIA TYPE: ICD-10-CM

## 2021-08-10 DIAGNOSIS — I10 ESSENTIAL HYPERTENSION: ICD-10-CM

## 2021-08-10 DIAGNOSIS — I25.119 CORONARY ARTERY DISEASE INVOLVING NATIVE HEART WITH ANGINA PECTORIS, UNSPECIFIED VESSEL OR LESION TYPE (HCC): Primary | ICD-10-CM

## 2021-08-10 PROCEDURE — 99214 OFFICE O/P EST MOD 30 MIN: CPT | Performed by: INTERNAL MEDICINE

## 2021-08-10 PROCEDURE — 3008F BODY MASS INDEX DOCD: CPT | Performed by: INTERNAL MEDICINE

## 2021-08-10 PROCEDURE — 3078F DIAST BP <80 MM HG: CPT | Performed by: INTERNAL MEDICINE

## 2021-08-10 PROCEDURE — 3074F SYST BP LT 130 MM HG: CPT | Performed by: INTERNAL MEDICINE

## 2021-08-10 RX ORDER — ATORVASTATIN CALCIUM 40 MG/1
TABLET, FILM COATED ORAL
Qty: 90 TABLET | Refills: 3 | Status: SHIPPED | OUTPATIENT
Start: 2021-08-10

## 2021-08-10 NOTE — PROGRESS NOTES
[facility] CLINIC  PROGRESS NOTE    Anni Hernandez is a 50year old male. Patient presents with:   Follow - Up  CAD    HPI:   This is a pleasant 50year old male with coronary status post PCI to the circumflex and ramus in 2015 with known collateralized righ Relation Age of Onset   • Heart Disease Father         premature CAD (cause of death)   • Lipids Mother         hyperlipidemia   • Stroke Paternal Grandmother         CVA   • Diabetes Other         family h/o   • Stroke Other         family h/o CVA   • Str months. Blood pressure stable. With collateralized right we will continue clopidogrel with other medicines. He will call if changes. The patient indicates understanding of these issues and agrees to the plan.   Follow Up: Return in about 1 year (around

## 2021-08-13 ENCOUNTER — TELEPHONE (OUTPATIENT)
Dept: GASTROENTEROLOGY | Facility: CLINIC | Age: 56
End: 2021-08-13

## 2021-08-13 DIAGNOSIS — Z86.010 PERSONAL HISTORY OF COLONIC POLYPS: Primary | ICD-10-CM

## 2021-08-13 NOTE — TELEPHONE ENCOUNTER
Patient calling to schedule his 3 year colonoscopy recall-    Last Procedure:  Colonoscopy 07/03/2018  · Last Diagnosis:  Sessile 6mm polyp removed from the cecum by cold snare polypectomy, suctioned. Polyp was about 10mm from appendiceal orifice.   Intern appendiceal orifice, cecal strap, ileocecal valve. Retroflexion was performed in the rectum.     The quality of the prep was excellent. COLONOSCOPY FINDINGS:    · Sessile 6mm polyp removed from the cecum by cold snare polypectomy, suctioned.   Polyp wa

## 2021-08-13 NOTE — TELEPHONE ENCOUNTER
Pt called in wondering if he needs to schedule an procedure he states he has to have one every 3 year's and last procedure was in 2018. I do not see a letter. Pt gives permission to leave detailed message if there is no answer.  Please follow up

## 2021-08-14 ENCOUNTER — OFFICE VISIT (OUTPATIENT)
Dept: INTERNAL MEDICINE CLINIC | Facility: CLINIC | Age: 56
End: 2021-08-14
Payer: COMMERCIAL

## 2021-08-14 ENCOUNTER — LAB ENCOUNTER (OUTPATIENT)
Dept: LAB | Facility: HOSPITAL | Age: 56
End: 2021-08-14
Attending: INTERNAL MEDICINE
Payer: COMMERCIAL

## 2021-08-14 VITALS
RESPIRATION RATE: 18 BRPM | WEIGHT: 141 LBS | DIASTOLIC BLOOD PRESSURE: 78 MMHG | SYSTOLIC BLOOD PRESSURE: 124 MMHG | TEMPERATURE: 98 F | BODY MASS INDEX: 20.19 KG/M2 | HEART RATE: 56 BPM | HEIGHT: 70 IN

## 2021-08-14 DIAGNOSIS — E78.5 HYPERLIPIDEMIA, UNSPECIFIED HYPERLIPIDEMIA TYPE: ICD-10-CM

## 2021-08-14 DIAGNOSIS — H93.19 TINNITUS, UNSPECIFIED LATERALITY: ICD-10-CM

## 2021-08-14 DIAGNOSIS — I25.119 CORONARY ARTERY DISEASE INVOLVING NATIVE HEART WITH ANGINA PECTORIS, UNSPECIFIED VESSEL OR LESION TYPE (HCC): ICD-10-CM

## 2021-08-14 DIAGNOSIS — I25.10 CORONARY ARTERY DISEASE INVOLVING NATIVE CORONARY ARTERY OF NATIVE HEART WITHOUT ANGINA PECTORIS: ICD-10-CM

## 2021-08-14 DIAGNOSIS — I10 ESSENTIAL HYPERTENSION: Primary | ICD-10-CM

## 2021-08-14 DIAGNOSIS — F41.9 ANXIETY: ICD-10-CM

## 2021-08-14 LAB
CHOLEST SMN-MCNC: 115 MG/DL (ref ?–200)
HDLC SERPL-MCNC: 40 MG/DL (ref 40–59)
LDLC SERPL CALC-MCNC: 55 MG/DL (ref ?–100)
NONHDLC SERPL-MCNC: 75 MG/DL (ref ?–130)
PATIENT FASTING Y/N/NP: YES
TRIGL SERPL-MCNC: 107 MG/DL (ref 30–149)
VLDLC SERPL CALC-MCNC: 16 MG/DL (ref 0–30)

## 2021-08-14 PROCEDURE — 3078F DIAST BP <80 MM HG: CPT | Performed by: INTERNAL MEDICINE

## 2021-08-14 PROCEDURE — 36415 COLL VENOUS BLD VENIPUNCTURE: CPT

## 2021-08-14 PROCEDURE — 3008F BODY MASS INDEX DOCD: CPT | Performed by: INTERNAL MEDICINE

## 2021-08-14 PROCEDURE — 80061 LIPID PANEL: CPT

## 2021-08-14 PROCEDURE — 99214 OFFICE O/P EST MOD 30 MIN: CPT | Performed by: INTERNAL MEDICINE

## 2021-08-14 PROCEDURE — 3074F SYST BP LT 130 MM HG: CPT | Performed by: INTERNAL MEDICINE

## 2021-08-14 RX ORDER — PANTOPRAZOLE SODIUM 40 MG/1
40 TABLET, DELAYED RELEASE ORAL
Qty: 15 TABLET | Refills: 0 | Status: SHIPPED | OUTPATIENT
Start: 2021-08-14 | End: 2021-08-14

## 2021-08-14 NOTE — PROGRESS NOTES
HPI:    Patient ID: Toni Singleton is a 50year old male. HPI  Patient is here for follow-up on chronic medical issues as listed below.   He was seen here in January of this year for general physical exam.  He was having stress and anxiety at that time Date   • CATH DRUG ELUTING STENT  06/02/2015   • COLONOSCOPY N/A 7/3/2018    Procedure: COLONOSCOPY;  Surgeon: Landon Morgan MD;  Location: [address] [place] [address] ENDOSCOPY   • CYST REMOVAL Left 12/10/2020    back   • [facility]   • SKIN JALEEL Pulse 56   Temp 97.6 °F (36.4 °C) (Other)   Resp 18   Ht 5' 10\" (1.778 m)   Wt 141 lb (64 kg)   BMI 20.23 kg/m²      Physical Exam  Constitutional:       Appearance: He is well-developed.    HENT:      Right Ear: Tympanic membrane and ear canal normal. further help. -  NAVIGATOR    4. Coronary artery disease involving native coronary artery of native heart without angina pectoris  Asymptomatic. Continue current treatment. Follow-up with cardiology.     5. Hyperlipidemia, unspecified hyperlipidemia ty

## 2021-08-15 RX ORDER — CLOBETASOL PROPIONATE 0.5 MG/G
OINTMENT TOPICAL
Qty: 30 G | Refills: 0 | Status: SHIPPED | OUTPATIENT
Start: 2021-08-15 | End: 2021-10-13

## 2021-08-16 RX ORDER — POLYETHYLENE GLYCOL 3350, SODIUM CHLORIDE, SODIUM BICARBONATE, POTASSIUM CHLORIDE 420; 11.2; 5.72; 1.48 G/4L; G/4L; G/4L; G/4L
POWDER, FOR SOLUTION ORAL
Qty: 4000 ML | Refills: 0 | Status: SHIPPED | OUTPATIENT
Start: 2021-08-16

## 2021-08-17 NOTE — TELEPHONE ENCOUNTER
Office visit Dr. Christine Bucio 8/14/2021 reviewed. Office visit with Dr. Alina Lyons of Cardiology 8/10/2021 reviewed. History of coronary artery stents 2015.   Reassuring recent cardiac stress test.    GI schedulers –    Please schedule colonosco

## 2021-08-19 ENCOUNTER — PATIENT MESSAGE (OUTPATIENT)
Dept: DERMATOLOGY CLINIC | Facility: CLINIC | Age: 56
End: 2021-08-19

## 2021-08-19 NOTE — TELEPHONE ENCOUNTER
Would recommend continued application until he no longer sees any additional improvement. Please remind him to use the mineral-based sunblock every morning even on cloudy days.

## 2021-08-20 NOTE — TELEPHONE ENCOUNTER
Schedulers:    Patient requesting a call after 4:30 pm to schedule the procedure per below orders from Dr. Amanda Jose. Thank you.

## 2021-08-23 ENCOUNTER — PATIENT MESSAGE (OUTPATIENT)
Dept: INTERNAL MEDICINE CLINIC | Facility: CLINIC | Age: 56
End: 2021-08-23

## 2021-08-23 NOTE — TELEPHONE ENCOUNTER
From: Rafia Dumont  To: Cam Navarro MD  Sent: 8/23/2021 8:29 AM CDT  Subject: Non-Urgent Medical Question    Good morning,  I had a colonoscopy procedure 3 years ago with Dr Shahida Clemente. However the procedure is overdue.  Can I do colaguard procedure ins

## 2021-08-23 NOTE — TELEPHONE ENCOUNTER
GI to please assist, thank you. GI office left message for patient to schedule procedure on 8/23/21. See 8/13/21 encounter for more info if needed.

## 2021-08-23 NOTE — TELEPHONE ENCOUNTER
Pt is returning the surgery sched call. I tried to reach, but not avail . pt. is avail. , btwn.  12:15 and 12:45. and 9:30am-9:45am. and 2:30 to 2:45. or anytime after 4:30pm.

## 2021-08-23 NOTE — TELEPHONE ENCOUNTER
Patient returned call, please call at [phone],PRASHANTH.   *request call back at 2:30 or after 4:30

## 2021-08-24 NOTE — TELEPHONE ENCOUNTER
Pt is returning the call. Gi  not available.  Please call between 12:15pm to 12:45pm if possible or after 4:30pm Thank you

## 2021-08-25 NOTE — TELEPHONE ENCOUNTER
Scheduled for:  Colonoscopy - [address]  Provider Name:  Dr. Chico Huerta  Date:  11/18/21  Location:  [facility]  Sedation:  MAC  Time:  Approx 7:45 am (pt is aware that [facility] will call with arrival time)  Prep:  Golytely, Prep instructions were given to pt over the phone,

## 2021-08-28 NOTE — TELEPHONE ENCOUNTER
See TE from 8/13/2021 as the pt has scheduled his colonoscopy as advised by GI:  Future Appointments   Date Time Provider Rafaela Blackburn   11/18/2021  7:45 AM GILLIAN, PROCEDURE ECWMOGIPROC None

## 2021-08-30 ENCOUNTER — PATIENT MESSAGE (OUTPATIENT)
Dept: INTERNAL MEDICINE CLINIC | Facility: CLINIC | Age: 56
End: 2021-08-30

## 2021-08-31 NOTE — TELEPHONE ENCOUNTER
From: Carlos Marsh  To: Katelynn Smith MD  Sent: 8/30/2021 6:37 PM CDT  Subject: Non-Urgent Medical Question    Hi,  I came to for a visit a few weeks ago and I forgot to talk about my blood sugar which is a bit higher than normal around 104-106.  Is t

## 2021-10-10 ENCOUNTER — LAB ENCOUNTER (OUTPATIENT)
Dept: LAB | Facility: HOSPITAL | Age: 56
End: 2021-10-10
Attending: INTERNAL MEDICINE
Payer: COMMERCIAL

## 2021-10-10 DIAGNOSIS — E78.5 HYPERLIPIDEMIA, UNSPECIFIED HYPERLIPIDEMIA TYPE: ICD-10-CM

## 2021-10-10 DIAGNOSIS — I25.119 CORONARY ARTERY DISEASE INVOLVING NATIVE HEART WITH ANGINA PECTORIS, UNSPECIFIED VESSEL OR LESION TYPE (HCC): ICD-10-CM

## 2021-10-10 PROCEDURE — 84450 TRANSFERASE (AST) (SGOT): CPT

## 2021-10-10 PROCEDURE — 36415 COLL VENOUS BLD VENIPUNCTURE: CPT

## 2021-10-10 PROCEDURE — 84460 ALANINE AMINO (ALT) (SGPT): CPT

## 2021-10-12 ENCOUNTER — OFFICE VISIT (OUTPATIENT)
Dept: PODIATRY CLINIC | Facility: CLINIC | Age: 56
End: 2021-10-12
Payer: COMMERCIAL

## 2021-10-12 VITALS — BODY MASS INDEX: 20.19 KG/M2 | WEIGHT: 141 LBS | HEIGHT: 70 IN

## 2021-10-12 DIAGNOSIS — B35.1 ONYCHOMYCOSIS: Primary | ICD-10-CM

## 2021-10-12 PROCEDURE — 99212 OFFICE O/P EST SF 10 MIN: CPT | Performed by: PODIATRIST

## 2021-10-12 PROCEDURE — 3008F BODY MASS INDEX DOCD: CPT | Performed by: PODIATRIST

## 2021-10-12 RX ORDER — KETOCONAZOLE 20 MG/G
CREAM TOPICAL
Qty: 60 G | Refills: 2 | Status: SHIPPED | OUTPATIENT
Start: 2021-10-12 | End: 2022-02-01 | Stop reason: ALTCHOICE

## 2021-10-12 NOTE — PROGRESS NOTES
[facility] Podiatry  Progress Note    Chau Louis is a 49year old male. Patient presents with:   Follow - Up: nail fungus on the left great toe ,patient states he sees a small improvement         HPI:     This is a pleasant male with PMH of CAD o PYLORIC MUSCLE  1965   • SKIN SURGERY Left 12/10/2020    epidermal inclusion cyst, lower back      Family History   Problem Relation Age of Onset   • Heart Disease Father         premature CAD (cause of death)   • Lipids Mother         hyperlipidemia   • S  (H) 01/26/2021    BUN 18 01/26/2021    CREATSERUM 0.89 01/26/2021    BUNCREA 18.0 05/16/2019    ANIONGAP 6 05/16/2019    GFRAA 113 01/26/2021    GFRNAA 98 01/26/2021    CA 9.0 01/26/2021     01/26/2021    K 4.3 01/26/2021     01/26/2

## 2021-10-13 ENCOUNTER — PATIENT MESSAGE (OUTPATIENT)
Dept: CARDIOLOGY CLINIC | Facility: CLINIC | Age: 56
End: 2021-10-13

## 2021-10-13 RX ORDER — CLOBETASOL PROPIONATE 0.5 MG/G
OINTMENT TOPICAL
Qty: 30 G | Refills: 0 | Status: SHIPPED | OUTPATIENT
Start: 2021-10-13 | End: 2022-02-01 | Stop reason: ALTCHOICE

## 2021-10-13 NOTE — TELEPHONE ENCOUNTER
· A high level of AST may mean problems in the liver. From: Natalia Avelar  To:  Gus Victoria MD  Sent: 10/13/2021  7:27 AM CDT  Subject: AST    Hi,  I received the liver function test result  on 10/10/and the AST 14 U/L is not in the normal r

## 2021-10-13 NOTE — TELEPHONE ENCOUNTER
Refill granted- please tell pt that he should only be using this on the thicker patches on legs, and should be applying the triamcinolone to other areas.   Will need reevaluation before additional refills

## 2021-10-15 ENCOUNTER — PATIENT MESSAGE (OUTPATIENT)
Dept: CARDIOLOGY CLINIC | Facility: CLINIC | Age: 56
End: 2021-10-15

## 2021-10-15 DIAGNOSIS — E78.5 HYPERLIPIDEMIA, UNSPECIFIED HYPERLIPIDEMIA TYPE: Primary | ICD-10-CM

## 2021-10-15 NOTE — TELEPHONE ENCOUNTER
Dipak Govea, NITESH   8/18/2021  9:01 AM CDT Back to Top        Lipids are stable, continue present management. Recheck in 1 yr. Orders place.

## 2021-11-15 ENCOUNTER — LAB REQUISITION (OUTPATIENT)
Dept: SURGERY | Age: 56
End: 2021-11-15
Payer: COMMERCIAL

## 2021-11-15 DIAGNOSIS — Z01.818 PREOP EXAMINATION: ICD-10-CM

## 2021-11-18 ENCOUNTER — LAB REQUISITION (OUTPATIENT)
Dept: SURGERY | Age: 56
End: 2021-11-18
Payer: COMMERCIAL

## 2021-11-18 ENCOUNTER — SURGERY CENTER DOCUMENTATION (OUTPATIENT)
Dept: SURGERY | Age: 56
End: 2021-11-18

## 2021-11-18 DIAGNOSIS — Z86.010 PERSONAL HISTORY OF COLONIC POLYPS: ICD-10-CM

## 2021-11-18 PROCEDURE — 88305 TISSUE EXAM BY PATHOLOGIST: CPT | Performed by: INTERNAL MEDICINE

## 2021-11-18 NOTE — PROCEDURES
[facility]      COLONOSCOPY PROCEDURE REPORT     DATE OF PROCEDURE:  11/18/2021     PCP: Angela Faria MD     PREOPERATIVE DIAGNOSIS:  history adenomatous colon polyp     POSTOPERATIVE DIAGNOSIS:  See impression.      SURGEON:  Chr

## 2021-12-04 ENCOUNTER — IMMUNIZATION (OUTPATIENT)
Dept: LAB | Facility: HOSPITAL | Age: 56
End: 2021-12-04
Attending: EMERGENCY MEDICINE
Payer: COMMERCIAL

## 2021-12-04 DIAGNOSIS — Z23 NEED FOR VACCINATION: Primary | ICD-10-CM

## 2021-12-04 PROCEDURE — 0004A SARSCOV2 VAC 30MCG/0.3ML IM: CPT

## 2021-12-06 ENCOUNTER — TELEPHONE (OUTPATIENT)
Dept: GASTROENTEROLOGY | Facility: CLINIC | Age: 56
End: 2021-12-06

## 2021-12-06 NOTE — TELEPHONE ENCOUNTER
Results letter mailed to patient per   Colon recall entered for repeat in 5 yrs, 11/18/2026  Colon done in 11/18/2021  HM Updated and Patient Outreach was placed for Colon recall.     Karthik Hagen MD  P Em Gi Clinical Staff  GI RNs - 1

## 2022-01-04 ENCOUNTER — PATIENT MESSAGE (OUTPATIENT)
Dept: SURGERY | Facility: CLINIC | Age: 57
End: 2022-01-04

## 2022-01-04 NOTE — TELEPHONE ENCOUNTER
From: Elias Chambers  To: Jeff Arellano MD  Sent: 1/4/2022 1:54 PM CST  Subject: cyst    Hi,  I have an appointment on Friday 1/7/2022 at 10:30 am because I found out a small cyst on my back which need to be removed. I am currently on blood thinner.  Do I

## 2022-01-07 ENCOUNTER — OFFICE VISIT (OUTPATIENT)
Dept: SURGERY | Facility: CLINIC | Age: 57
End: 2022-01-07
Payer: COMMERCIAL

## 2022-01-07 DIAGNOSIS — L72.0 EPIDERMAL INCLUSION CYST: Primary | ICD-10-CM

## 2022-01-07 PROCEDURE — 99214 OFFICE O/P EST MOD 30 MIN: CPT | Performed by: SURGERY

## 2022-01-07 NOTE — H&P
History and Physical      Ena Mckeon is a 49year old male. HPI   Patient presents with:  Cyst: Pt here to discuss cyst on mid upper back x few mos. Pt states his wife squeezed cyst and clear drainage came out. Pt c/o mild tenderness to area. • omega-3 fatty acids (FISH OIL) 1000 MG Oral Cap Take 1,000 mg by mouth daily. 90 capsule 3   • aspirin 81 MG Oral Tab EC Take 81 mg by mouth daily.        ALLERGIES  No Known Allergies    Social History    Socioeconomic History      Marital status: Alley Pichardo nt dry pores on back, hypertrophic scar intact L lower back. DIAGNOSTICS      ASSESSMENT/PLAN   Assessment   Epidermal inclusion cyst  (primary encounter diagnosis)    49year old male with a probable skin cyst of the R upper mid back - quiescent now.

## 2022-01-08 NOTE — PATIENT INSTRUCTIONS
Understanding Epidermoid Cyst     An epidermoid cyst is a small abnormal growth in the top layers of the skin. It's filled with keratin, the same proteins that make up your hair and nails. These cysts grow slowly. They can grow anywhere on the body.  Bu that don’t get better, or get worse  · New symptoms  Naz last reviewed this educational content on 6/1/2019    © 8893-4958 [employer]. All rights reserved. This information is not intended as a substitute for professional medical care.

## 2022-01-20 ENCOUNTER — TELEPHONE (OUTPATIENT)
Dept: DERMATOLOGY CLINIC | Facility: CLINIC | Age: 57
End: 2022-01-20

## 2022-01-20 NOTE — TELEPHONE ENCOUNTER
LOV 6/26/21 - (Former LSS pt) Last office visit notes state pt is to use until he is no longer improving. Pt states the topical is working, to refill and advise pt to continue? Appt needed prior to recommending or refilling this? Thank you.

## 2022-01-20 NOTE — TELEPHONE ENCOUNTER
Fluocin-Hydroquinone-Tretinoin (TRI-LAITH) 0.01-4-0.05 % External Cream    Patient was prescribed this rx by Dr. Zamzam Ni, and he's almost out. He wants to know if he should get a refill.  It is working, he just doesn't know if he needs to keep using it or n

## 2022-01-20 NOTE — TELEPHONE ENCOUNTER
Generally, this is not a medication that should be used for more than 12 weeks at a time on the face. LSS did want to have him f/u in 3-4 mos. The hydroquinone deposits in the skin over time and can cause subsequent further discoloration from that.  Other

## 2022-01-21 ENCOUNTER — OFFICE VISIT (OUTPATIENT)
Dept: DERMATOLOGY CLINIC | Facility: CLINIC | Age: 57
End: 2022-01-21
Payer: COMMERCIAL

## 2022-01-21 DIAGNOSIS — L81.9 HYPERPIGMENTATION: Primary | ICD-10-CM

## 2022-01-21 DIAGNOSIS — L81.6 POIKILODERMA: ICD-10-CM

## 2022-01-21 DIAGNOSIS — L81.4 LENTIGO: ICD-10-CM

## 2022-01-21 PROCEDURE — 99213 OFFICE O/P EST LOW 20 MIN: CPT | Performed by: DERMATOLOGY

## 2022-01-21 RX ORDER — TAZAROTENE 1 MG/G
CREAM TOPICAL
Qty: 30 G | Refills: 3 | Status: SHIPPED | OUTPATIENT
Start: 2022-01-21 | End: 2022-03-28

## 2022-01-21 NOTE — TELEPHONE ENCOUNTER
Pt scheduled for appt on 1/21/22. Pt will discuss medication refill and recommendations after today's office visit.

## 2022-01-26 RX ORDER — LISINOPRIL 10 MG/1
10 TABLET ORAL DAILY
Qty: 30 TABLET | Refills: 5 | Status: SHIPPED | OUTPATIENT
Start: 2022-01-26

## 2022-01-26 NOTE — TELEPHONE ENCOUNTER
Please review. Protocol Failed / No Protocol.     Requested Prescriptions   Pending Prescriptions Disp Refills    LISINOPRIL 10 MG Oral Tab [Pharmacy Med Name: LISINOPRIL 10 MG TABLET] 30 tablet 5     Sig: TAKE 1 TABLET BY MOUTH EVERY DAY        Hypertensi

## 2022-02-01 RX ORDER — CLOPIDOGREL BISULFATE 75 MG/1
75 TABLET ORAL DAILY
Qty: 90 TABLET | Refills: 1 | Status: SHIPPED | OUTPATIENT
Start: 2022-02-01 | End: 2022-03-17

## 2022-02-01 NOTE — TELEPHONE ENCOUNTER
Please review; protocol failed/no protocol. Requested Prescriptions   Pending Prescriptions Disp Refills    CLOPIDOGREL 75 MG Oral Tab [Pharmacy Med Name: CLOPIDOGREL 75 MG TABLET] 30 tablet 5     Sig: TAKE 1 TABLET BY MOUTH EVERY DAY        There is no refill protocol information for this order            Recent Outpatient Visits              1 week ago     [facility] Solo Arriola MD    Office Visit    3 weeks ago Epidermal inclusion cyst    [facility] Domonique [facility]    Office Visit    3 months ago Onychomycosis    [address], [place] Meshulam, Deannie Sly, DPM    Office Visit    5 months ago Essential hypertension    [address] [facility], [address], Steven Dodson MD    Office Visit    5 months ago Coronary artery disease involving native heart with angina pectoris, unspecified vessel or lesion type [facility])    [facility] Radhika Bell MD    Office Visit             Future Appointments         Provider Department Appt Notes    In 1 week Quiana Forte, [address] [facility]  [address], [place] nail follow up    In 2 weeks Sen Espinoza MD [address] [facility] [place], [address], [facility]

## 2022-02-08 ENCOUNTER — OFFICE VISIT (OUTPATIENT)
Dept: PODIATRY CLINIC | Facility: CLINIC | Age: 57
End: 2022-02-08
Payer: COMMERCIAL

## 2022-02-08 DIAGNOSIS — B35.1 ONYCHOMYCOSIS: Primary | ICD-10-CM

## 2022-02-08 PROCEDURE — 99212 OFFICE O/P EST SF 10 MIN: CPT | Performed by: PODIATRIST

## 2022-02-08 NOTE — TELEPHONE ENCOUNTER
Jublia 10% not covered by insurance not covered, is Ciclopirox kit 8% which is covered [address] [address]? 5

## 2022-02-14 ENCOUNTER — OFFICE VISIT (OUTPATIENT)
Dept: INTERNAL MEDICINE CLINIC | Facility: CLINIC | Age: 57
End: 2022-02-14
Payer: COMMERCIAL

## 2022-02-14 ENCOUNTER — PATIENT MESSAGE (OUTPATIENT)
Dept: ORTHOPEDICS CLINIC | Facility: CLINIC | Age: 57
End: 2022-02-14

## 2022-02-14 ENCOUNTER — OFFICE VISIT (OUTPATIENT)
Dept: ORTHOPEDICS CLINIC | Facility: CLINIC | Age: 57
End: 2022-02-14
Payer: COMMERCIAL

## 2022-02-14 ENCOUNTER — HOSPITAL ENCOUNTER (OUTPATIENT)
Dept: GENERAL RADIOLOGY | Age: 57
Discharge: HOME | End: 2022-02-14
Attending: ORTHOPAEDIC SURGERY
Payer: COMMERCIAL

## 2022-02-14 ENCOUNTER — TELEPHONE (OUTPATIENT)
Dept: ORTHOPEDICS CLINIC | Facility: CLINIC | Age: 57
End: 2022-02-14

## 2022-02-14 VITALS
BODY MASS INDEX: 20.1 KG/M2 | TEMPERATURE: 98 F | RESPIRATION RATE: 18 BRPM | WEIGHT: 140.38 LBS | HEIGHT: 70 IN | SYSTOLIC BLOOD PRESSURE: 126 MMHG | HEART RATE: 68 BPM | DIASTOLIC BLOOD PRESSURE: 78 MMHG

## 2022-02-14 VITALS — BODY MASS INDEX: 20.04 KG/M2 | HEIGHT: 70 IN | WEIGHT: 140 LBS

## 2022-02-14 DIAGNOSIS — E78.5 HYPERLIPIDEMIA, UNSPECIFIED HYPERLIPIDEMIA TYPE: ICD-10-CM

## 2022-02-14 DIAGNOSIS — F41.9 ANXIETY: ICD-10-CM

## 2022-02-14 DIAGNOSIS — I10 ESSENTIAL HYPERTENSION: ICD-10-CM

## 2022-02-14 DIAGNOSIS — Z00.00 ROUTINE PHYSICAL EXAMINATION: Primary | ICD-10-CM

## 2022-02-14 DIAGNOSIS — M25.512 LEFT SHOULDER PAIN, UNSPECIFIED CHRONICITY: ICD-10-CM

## 2022-02-14 DIAGNOSIS — I25.10 CORONARY ARTERY DISEASE INVOLVING NATIVE CORONARY ARTERY OF NATIVE HEART WITHOUT ANGINA PECTORIS: ICD-10-CM

## 2022-02-14 DIAGNOSIS — M75.02 ADHESIVE CAPSULITIS OF LEFT SHOULDER: Primary | ICD-10-CM

## 2022-02-14 PROCEDURE — 73030 X-RAY EXAM OF SHOULDER: CPT | Performed by: ORTHOPAEDIC SURGERY

## 2022-02-14 PROCEDURE — 3008F BODY MASS INDEX DOCD: CPT | Performed by: INTERNAL MEDICINE

## 2022-02-14 PROCEDURE — 99396 PREV VISIT EST AGE 40-64: CPT | Performed by: INTERNAL MEDICINE

## 2022-02-14 PROCEDURE — 99204 OFFICE O/P NEW MOD 45 MIN: CPT | Performed by: ORTHOPAEDIC SURGERY

## 2022-02-14 PROCEDURE — 3008F BODY MASS INDEX DOCD: CPT | Performed by: ORTHOPAEDIC SURGERY

## 2022-02-14 PROCEDURE — 3074F SYST BP LT 130 MM HG: CPT | Performed by: INTERNAL MEDICINE

## 2022-02-14 PROCEDURE — 3078F DIAST BP <80 MM HG: CPT | Performed by: INTERNAL MEDICINE

## 2022-02-14 RX ORDER — KETOCONAZOLE 20 MG/G
CREAM TOPICAL
COMMUNITY
Start: 2022-02-08 | End: 2022-03-28

## 2022-02-14 RX ORDER — KETOROLAC TROMETHAMINE 30 MG/ML
30 INJECTION, SOLUTION INTRAMUSCULAR; INTRAVENOUS ONCE
Status: DISCONTINUED | OUTPATIENT
Start: 2022-02-14 | End: 2022-02-14

## 2022-02-14 RX ORDER — TRIAMCINOLONE ACETONIDE 40 MG/ML
40 INJECTION, SUSPENSION INTRA-ARTICULAR; INTRAMUSCULAR ONCE
Status: DISCONTINUED | OUTPATIENT
Start: 2022-02-14 | End: 2022-02-14

## 2022-02-14 NOTE — PROCEDURES
Left Shoulder Glenohumeral Joint Injection    Name: Robbie Alanis   MRN: [id number]  Date: 2/14/2022     Clinical Indications:   Adhesive Capsulitis. After informed consent, the injection site was marked, sterilized with topical chlorhexidine antiseptic, and locally anesthetized with skin refrigerant. The patient was seated upright and the shoulder was exposed. Using sterile technique: 1 mL of 30mg/mL of Ketorolac, 2 mL of 0.5% Bupivicaine, 2 mL of 1% Lidocaine, and 1 mg of 40mg/mL of Triamcinolone (Kenalog) was injected with a Anterior approach utilizing a 21 gauge needle. A band-aid was applied. The patient tolerated the procedure well. Disposition:   Proceed with physical therapy and return for repeat evaluation in 6 weeks. No imaging required at next visit. Shan Sellers. Marlyn Chavez MD  Knee, Shoulder, & Elbow Surgery / Sports Medicine Specialist  [facility]  [address], [address] [place] [address]. [email or web address] [email or web address]  t: [phone]  o: [phone]  f: [phone]

## 2022-02-15 NOTE — TELEPHONE ENCOUNTER
From: Marzetta Fothergill  To: Suzan Russell MD  Sent: 2/14/2022 6:44 PM CST  Subject: From Luzmaria    I went to see my primary doctor today at the [facility] and he approved the steriod injection. Do I need to schedule a nurse visit for the injection or I need to schedule an office visit?  Thanks

## 2022-02-16 ENCOUNTER — TELEPHONE (OUTPATIENT)
Dept: INTERNAL MEDICINE CLINIC | Facility: CLINIC | Age: 57
End: 2022-02-16

## 2022-02-16 NOTE — TELEPHONE ENCOUNTER
Patient asking about interactions with Allegra and fluticasone with current medications. Checked drug interaction  on [employer] and found the following. Minor  atorvastatin + fexofenadine  atorvastatin will increase the level or effect of fexofenadine by P-glycoprotein (MDR1) efflux transporter. Minor/Significance Unknown. Patient advised of above, states he will continue with Zyrtec which showed no interactions and Allegra only as needed. No further questions or concerns.

## 2022-02-18 ENCOUNTER — OFFICE VISIT (OUTPATIENT)
Dept: ORTHOPEDICS CLINIC | Facility: CLINIC | Age: 57
End: 2022-02-18
Payer: COMMERCIAL

## 2022-02-18 VITALS — BODY MASS INDEX: 20 KG/M2 | HEIGHT: 70 IN

## 2022-02-18 DIAGNOSIS — M75.02 ADHESIVE CAPSULITIS OF LEFT SHOULDER: Primary | ICD-10-CM

## 2022-02-18 LAB
ABSOLUTE BASOPHILS: 30 CELLS/UL (ref 0–200)
ABSOLUTE EOSINOPHILS: 82 CELLS/UL (ref 15–500)
ABSOLUTE LYMPHOCYTES: 980 CELLS/UL (ref 850–3900)
ABSOLUTE MONOCYTES: 288 CELLS/UL (ref 200–950)
ABSOLUTE NEUTROPHILS: 2920 CELLS/UL (ref 1500–7800)
ALBUMIN/GLOBULIN RATIO: 2 (CALC) (ref 1–2.5)
ALBUMIN: 4.3 G/DL (ref 3.6–5.1)
ALKALINE PHOSPHATASE: 66 U/L (ref 35–144)
ALT: 22 U/L (ref 9–46)
AST: 20 U/L (ref 10–35)
BASOPHILS: 0.7 %
BILIRUBIN, TOTAL: 0.6 MG/DL (ref 0.2–1.2)
CARBON DIOXIDE: 30 MMOL/L (ref 20–32)
CHLORIDE: 103 MMOL/L (ref 98–110)
CHOL/HDLC RATIO: 3.1 (CALC)
CREATININE: 0.86 MG/DL (ref 0.7–1.33)
EGFR IF AFRICN AM: 114 ML/MIN/1.73M2
EGFR IF NONAFRICN AM: 98 ML/MIN/1.73M2
EOSINOPHILS: 1.9 %
GLOBULIN: 2.2 G/DL (CALC) (ref 1.9–3.7)
GLUCOSE: 111 MG/DL (ref 65–99)
HDL CHOLESTEROL: 40 MG/DL
HEMATOCRIT: 45.4 % (ref 38.5–50)
HEMOGLOBIN A1C: 5.5 % OF TOTAL HGB
HEMOGLOBIN: 14.8 G/DL (ref 13.2–17.1)
LDL-CHOLESTEROL: 64 MG/DL (CALC)
LYMPHOCYTES: 22.8 %
MCH: 30.1 PG (ref 27–33)
MCHC: 32.6 G/DL (ref 32–36)
MCV: 92.3 FL (ref 80–100)
MONOCYTES: 6.7 %
MPV: 10 FL (ref 7.5–12.5)
NEUTROPHILS: 67.9 %
NON-HDL CHOLESTEROL: 82 MG/DL (CALC)
PLATELET COUNT: 206 THOUSAND/UL (ref 140–400)
POTASSIUM: 4 MMOL/L (ref 3.5–5.3)
PROTEIN, TOTAL: 6.5 G/DL (ref 6.1–8.1)
PSA, TOTAL: 0.54 NG/ML
RDW: 12.4 % (ref 11–15)
RED BLOOD CELL COUNT: 4.92 MILLION/UL (ref 4.2–5.8)
SODIUM: 137 MMOL/L (ref 135–146)
TRIGLYCERIDES: 93 MG/DL
TSH W/REFLEX TO FT4: 1.17 MIU/L (ref 0.4–4.5)
VITAMIN B12: 786 PG/ML (ref 200–1100)
WHITE BLOOD CELL COUNT: 4.3 THOUSAND/UL (ref 3.8–10.8)

## 2022-02-18 PROCEDURE — 99213 OFFICE O/P EST LOW 20 MIN: CPT | Performed by: ORTHOPAEDIC SURGERY

## 2022-02-18 PROCEDURE — 20610 DRAIN/INJ JOINT/BURSA W/O US: CPT | Performed by: ORTHOPAEDIC SURGERY

## 2022-02-18 RX ORDER — TRIAMCINOLONE ACETONIDE 40 MG/ML
40 INJECTION, SUSPENSION INTRA-ARTICULAR; INTRAMUSCULAR ONCE
Status: COMPLETED | OUTPATIENT
Start: 2022-02-18 | End: 2022-02-18

## 2022-02-18 RX ORDER — KETOROLAC TROMETHAMINE 30 MG/ML
30 INJECTION, SOLUTION INTRAMUSCULAR; INTRAVENOUS ONCE
Status: COMPLETED | OUTPATIENT
Start: 2022-02-18 | End: 2022-02-18

## 2022-02-18 RX ADMIN — KETOROLAC TROMETHAMINE 30 MG: 30 INJECTION, SOLUTION INTRAMUSCULAR; INTRAVENOUS at 08:53:00

## 2022-02-18 RX ADMIN — TRIAMCINOLONE ACETONIDE 40 MG: 40 INJECTION, SUSPENSION INTRA-ARTICULAR; INTRAMUSCULAR at 08:53:00

## 2022-02-18 NOTE — PROCEDURES
Left Shoulder Glenohumeral Joint Injection    Name: Cuate Nelson   MRN: [id number]  Date: 2/18/2022     Clinical Indications:   Adhesive Capsulitis. After informed consent, the injection site was marked, sterilized with topical chlorhexidine antiseptic, and locally anesthetized with skin refrigerant. The patient was seated upright and the shoulder was exposed. Using sterile technique: 1 mL of 30mg/mL of Ketorolac, 2 mL of 0.5% Bupivicaine, 2 mL of 1% Lidocaine, and 1 mg of 40mg/mL of Triamcinolone (Kenalog) was injected with a Anterior approach utilizing a 21 gauge needle. A band-aid was applied. The patient tolerated the procedure well. Disposition:   Return to clinic on an as needed basis. Nicolasa Phillips. Acosta Tellez MD  Knee, Shoulder, & Elbow Surgery / Sports Medicine Specialist  [facility]  [place] [address], [place] , [address] [facility]. [email or web address]  t: [phone]  o: [phone]  f: [phone]

## 2022-02-28 ENCOUNTER — NURSE TRIAGE (OUTPATIENT)
Dept: INTERNAL MEDICINE CLINIC | Facility: CLINIC | Age: 57
End: 2022-02-28

## 2022-03-02 ENCOUNTER — OFFICE VISIT (OUTPATIENT)
Dept: INTERNAL MEDICINE CLINIC | Facility: CLINIC | Age: 57
End: 2022-03-02
Payer: COMMERCIAL

## 2022-03-02 VITALS
BODY MASS INDEX: 20.19 KG/M2 | HEIGHT: 70 IN | DIASTOLIC BLOOD PRESSURE: 82 MMHG | HEART RATE: 72 BPM | WEIGHT: 141 LBS | SYSTOLIC BLOOD PRESSURE: 134 MMHG

## 2022-03-02 DIAGNOSIS — I20.8 EXERTIONAL ANGINA (HCC): Primary | ICD-10-CM

## 2022-03-02 PROCEDURE — 3079F DIAST BP 80-89 MM HG: CPT | Performed by: INTERNAL MEDICINE

## 2022-03-02 PROCEDURE — 3075F SYST BP GE 130 - 139MM HG: CPT | Performed by: INTERNAL MEDICINE

## 2022-03-02 PROCEDURE — 3008F BODY MASS INDEX DOCD: CPT | Performed by: INTERNAL MEDICINE

## 2022-03-02 PROCEDURE — 99214 OFFICE O/P EST MOD 30 MIN: CPT | Performed by: INTERNAL MEDICINE

## 2022-03-02 RX ORDER — METOPROLOL SUCCINATE 50 MG/1
50 TABLET, EXTENDED RELEASE ORAL DAILY
Qty: 30 TABLET | Refills: 0 | Status: SHIPPED | OUTPATIENT
Start: 2022-03-02 | End: 2022-03-28

## 2022-03-02 RX ORDER — PANTOPRAZOLE SODIUM 40 MG/1
40 TABLET, DELAYED RELEASE ORAL
COMMUNITY
End: 2022-03-11

## 2022-03-02 NOTE — PATIENT INSTRUCTIONS
Please stop exercising for now. Begin metoprolol ER 50 mg 1 tablet daily. Call Dr. Teodoro Saxena today for evaluation as soon as you can. Go to the ER if chest pain occurs while at rest or if episodes are prolonged, for more immediate evaluation.

## 2022-03-08 ENCOUNTER — OFFICE VISIT (OUTPATIENT)
Dept: GASTROENTEROLOGY | Facility: CLINIC | Age: 57
End: 2022-03-08
Payer: COMMERCIAL

## 2022-03-08 VITALS
BODY MASS INDEX: 20.19 KG/M2 | WEIGHT: 141 LBS | HEIGHT: 70 IN | DIASTOLIC BLOOD PRESSURE: 84 MMHG | SYSTOLIC BLOOD PRESSURE: 126 MMHG

## 2022-03-08 DIAGNOSIS — R07.9 CHEST PAIN, UNSPECIFIED TYPE: Primary | ICD-10-CM

## 2022-03-08 DIAGNOSIS — R07.89 BURNING CHEST PAIN: ICD-10-CM

## 2022-03-08 PROCEDURE — 3079F DIAST BP 80-89 MM HG: CPT | Performed by: INTERNAL MEDICINE

## 2022-03-08 PROCEDURE — 99214 OFFICE O/P EST MOD 30 MIN: CPT | Performed by: INTERNAL MEDICINE

## 2022-03-08 PROCEDURE — 3008F BODY MASS INDEX DOCD: CPT | Performed by: INTERNAL MEDICINE

## 2022-03-08 PROCEDURE — 3074F SYST BP LT 130 MM HG: CPT | Performed by: INTERNAL MEDICINE

## 2022-03-08 RX ORDER — PANTOPRAZOLE SODIUM 40 MG/1
40 TABLET, DELAYED RELEASE ORAL
Qty: 60 TABLET | Refills: 0 | Status: SHIPPED | OUTPATIENT
Start: 2022-03-08 | End: 2022-03-28

## 2022-03-08 RX ORDER — ISOSORBIDE MONONITRATE 30 MG/1
TABLET, EXTENDED RELEASE ORAL
COMMUNITY
Start: 2022-03-03 | End: 2022-03-28

## 2022-03-14 ENCOUNTER — LAB ENCOUNTER (OUTPATIENT)
Dept: LAB | Age: 57
End: 2022-03-14
Attending: INTERNAL MEDICINE
Payer: COMMERCIAL

## 2022-03-14 ENCOUNTER — NURSE ONLY (OUTPATIENT)
Dept: LAB | Age: 57
End: 2022-03-14
Attending: INTERNAL MEDICINE
Payer: COMMERCIAL

## 2022-03-14 ENCOUNTER — HOSPITAL ENCOUNTER (OUTPATIENT)
Dept: GENERAL RADIOLOGY | Age: 57
Discharge: HOME | End: 2022-03-14
Attending: INTERNAL MEDICINE
Payer: COMMERCIAL

## 2022-03-14 DIAGNOSIS — Z01.818 PRE-OP TESTING: ICD-10-CM

## 2022-03-14 LAB
ANION GAP SERPL CALC-SCNC: 3 MMOL/L (ref 0–18)
BUN BLD-MCNC: 18 MG/DL (ref 7–18)
CALCIUM BLD-MCNC: 9 MG/DL (ref 8.5–10.1)
CHLORIDE SERPL-SCNC: 106 MMOL/L (ref 98–112)
CO2 SERPL-SCNC: 29 MMOL/L (ref 21–32)
CREAT BLD-MCNC: 0.97 MG/DL
ERYTHROCYTE [DISTWIDTH] IN BLOOD BY AUTOMATED COUNT: 13.2 %
FASTING STATUS PATIENT QL REPORTED: NO
GLUCOSE BLD-MCNC: 184 MG/DL (ref 70–99)
HCT VFR BLD AUTO: 47.1 %
HGB BLD-MCNC: 14.8 G/DL
INR BLD: 1.15 (ref 0.8–1.2)
MCH RBC QN AUTO: 29.7 PG (ref 26–34)
MCHC RBC AUTO-ENTMCNC: 31.4 G/DL (ref 31–37)
MCV RBC AUTO: 94.6 FL
OSMOLALITY SERPL CALC.SUM OF ELEC: 293 MOSM/KG (ref 275–295)
PLATELET # BLD AUTO: 221 10(3)UL (ref 150–450)
PROTHROMBIN TIME: 14.8 SECONDS (ref 11.6–14.8)
RBC # BLD AUTO: 4.98 X10(6)UL
SODIUM SERPL-SCNC: 138 MMOL/L (ref 136–145)
WBC # BLD AUTO: 6.3 X10(3) UL (ref 4–11)

## 2022-03-14 PROCEDURE — 80048 BASIC METABOLIC PNL TOTAL CA: CPT

## 2022-03-14 PROCEDURE — 71046 X-RAY EXAM CHEST 2 VIEWS: CPT | Performed by: INTERNAL MEDICINE

## 2022-03-14 PROCEDURE — 85027 COMPLETE CBC AUTOMATED: CPT

## 2022-03-14 PROCEDURE — 85610 PROTHROMBIN TIME: CPT

## 2022-03-15 LAB — SARS-COV-2 RNA RESP QL NAA+PROBE: NOT DETECTED

## 2022-03-17 ENCOUNTER — HOSPITAL ENCOUNTER (OUTPATIENT)
Dept: INTERVENTIONAL RADIOLOGY/VASCULAR | Facility: HOSPITAL | Age: 57
Discharge: HOME | End: 2022-03-17
Attending: INTERNAL MEDICINE | Admitting: INTERNAL MEDICINE
Payer: COMMERCIAL

## 2022-03-17 VITALS
RESPIRATION RATE: 17 BRPM | WEIGHT: 139 LBS | HEIGHT: 70 IN | BODY MASS INDEX: 19.9 KG/M2 | OXYGEN SATURATION: 99 % | SYSTOLIC BLOOD PRESSURE: 104 MMHG | TEMPERATURE: 98 F | DIASTOLIC BLOOD PRESSURE: 70 MMHG | HEART RATE: 69 BPM

## 2022-03-17 DIAGNOSIS — Z01.818 PRE-OP TESTING: Primary | ICD-10-CM

## 2022-03-17 DIAGNOSIS — R07.9 CHEST PAIN: ICD-10-CM

## 2022-03-17 DIAGNOSIS — I25.10 CAD (CORONARY ARTERY DISEASE): ICD-10-CM

## 2022-03-17 DIAGNOSIS — Z98.61 HISTORY OF PERCUTANEOUS CORONARY INTERVENTION: ICD-10-CM

## 2022-03-17 DIAGNOSIS — R94.39 ABNORMAL NUCLEAR STRESS TEST: ICD-10-CM

## 2022-03-17 PROCEDURE — 4A023N7 MEASUREMENT OF CARDIAC SAMPLING AND PRESSURE, LEFT HEART, PERCUTANEOUS APPROACH: ICD-10-PCS | Performed by: INTERNAL MEDICINE

## 2022-03-17 PROCEDURE — 93458 L HRT ARTERY/VENTRICLE ANGIO: CPT

## 2022-03-17 PROCEDURE — 36415 COLL VENOUS BLD VENIPUNCTURE: CPT

## 2022-03-17 PROCEDURE — 93571 IV DOP VEL&/PRESS C FLO 1ST: CPT

## 2022-03-17 PROCEDURE — B2121ZZ FLUOROSCOPY OF SINGLE CORONARY ARTERY BYPASS GRAFT USING LOW OSMOLAR CONTRAST: ICD-10-PCS | Performed by: INTERNAL MEDICINE

## 2022-03-17 PROCEDURE — B2111ZZ FLUOROSCOPY OF MULTIPLE CORONARY ARTERIES USING LOW OSMOLAR CONTRAST: ICD-10-PCS | Performed by: INTERNAL MEDICINE

## 2022-03-17 PROCEDURE — 99152 MOD SED SAME PHYS/QHP 5/>YRS: CPT

## 2022-03-17 RX ORDER — VERAPAMIL HYDROCHLORIDE 2.5 MG/ML
INJECTION, SOLUTION INTRAVENOUS
Status: COMPLETED
Start: 2022-03-17 | End: 2022-03-17

## 2022-03-17 RX ORDER — NITROGLYCERIN 20 MG/100ML
INJECTION INTRAVENOUS
Status: COMPLETED
Start: 2022-03-17 | End: 2022-03-17

## 2022-03-17 RX ORDER — LIDOCAINE HYDROCHLORIDE 20 MG/ML
INJECTION, SOLUTION EPIDURAL; INFILTRATION; INTRACAUDAL; PERINEURAL
Status: COMPLETED
Start: 2022-03-17 | End: 2022-03-17

## 2022-03-17 RX ORDER — MIDAZOLAM HYDROCHLORIDE 1 MG/ML
INJECTION INTRAMUSCULAR; INTRAVENOUS
Status: COMPLETED
Start: 2022-03-17 | End: 2022-03-17

## 2022-03-17 RX ORDER — SODIUM CHLORIDE 9 MG/ML
INJECTION, SOLUTION INTRAVENOUS
Status: DISCONTINUED | OUTPATIENT
Start: 2022-03-17 | End: 2022-03-17

## 2022-03-17 RX ORDER — HEPARIN SODIUM 1000 [USP'U]/ML
INJECTION, SOLUTION INTRAVENOUS; SUBCUTANEOUS
Status: COMPLETED
Start: 2022-03-17 | End: 2022-03-17

## 2022-03-17 NOTE — INTERVAL H&P NOTE
Pre-op Diagnosis: * No pre-op diagnosis entered *    The above referenced H&P was reviewed by Sumit Smiley MD on 3/17/2022, the patient was examined and no significant changes have occurred in the patient's condition since the H&P was performed. I discussed with the patient and/or legal representative the potential benefits, risks and side effects of this procedure; the likelihood of the patient achieving goals; and potential problems that might occur during recuperation. I discussed reasonable alternatives to the procedure, including risks, benefits and side effects related to the alternatives and risks related to not receiving this procedure. We will proceed with procedure as planned.

## 2022-03-17 NOTE — IVS NOTE
DISCHARGE NOTE     Pt is able to sit up and ambulate without difficulty. Pt voided and tolerated fluids and food. Procedural site remains dry and intact with good circulation, motion, and sensation. No signs and symptoms of bleeding/hematoma noted. Pt denies any pain or discomfort at this time. IV access removed  Instruction provided, patient/family verbalizes understanding. Dr. Mellisa Garcia spoke with patient/family post procedure.      Pt discharge via wheelchair to / [place] [address] to follow up appt on March 29 at 0900AM.

## 2022-03-17 NOTE — PROCEDURES
[facility]    Cardiac Cath Procedure Note  Malou Null Patient Status:  Outpatient in a Bed    1965 MRN [id number]   Location [facility] Attending Thomas Suarez MD   [address] Day # 0 PCP Ricci Izaguirre MD       Cardiologist: Trey Watson MD  Primary Proceduralist: Trey Watson MD  Procedure Performed: LHC, LV and FFR LAD  Date of Procedure: 3/17/2022   Indication: Positive stress test    Summary of procedure: Significant multivessel coronary disease including FFR positive LAD. CV surgery consultation for bypass of PDA, diagonal, LAD, OM. Left Ventriculography and hemodynamics: Aortic valve not crossed      Coronary Angiography  RCA: 100% occluded in the proximal segment. Bridging right to right collateral which fills the AV groove portion of the RCA antegrade. Epicardial LAD collateral which fills the PDA retrograde. Appears to have relatively healthy RCA after proximal vessel stenosis. Left main:  Free of obstructive disease    Left anterior descending: Diffusely diseased LAD with sequential stenoses in the proximal and mid segment. 60 to 70% stenosis. 95% ostial diagonal stenosis. Small to moderate sized vessel to the lateral wall. Ramus: Patent stent, moderate size artery nonobstructive. Circumflex: Just after the takeoff of the ramus/high OM there is a 99% proximal vessel stenosis into the circumflex. Circumflex supplies significant obtuse marginal branches to the lateral wall. LAD FFR    Guide Catheter: EBU 3.75  Wire: Comet II   Wire advanced outside the guide catheter, wire zeroed and advanced through lesion. dFR measurement taken, 0.88 (significant)      Assessment:  CAD: Significant progression of coronary disease with hemodynamically significant LAD stenosis, known  RCA, now new critical circumflex stenosis likely culprit for new symptoms.   Preserved ejection fraction  Family history of premature coronary disease      Recommendations:  Multivessel coronary disease as above, CV surgery evaluation. Summary of Case: After written informed consent was obtained from the patient, patient was brought to the cardiac catheterization laboratory. Patient was prepped and draped in the usual sterile fashion. Lidocaine 1% was used to infiltrate the right radial artery for local anesthesia and a 6 Niuean introducer sheath was inserted into the right radial artery. Selective coronary angiography performed with JR4 catheter for RCA and JL3.5 catheter for LCA. Angiography performed in standard projections. Specimen sent to: No specimen collected  Estimated blood loss: 10 cc  Closure:  TR band      IV was maintained by RN and moderate conscious sedation of versed and fentanyl was given. Patient was assessed and monitoring of oxygen, heart rate and blood pressure by nurse and myself during the exam 35 minutes.       Skinny Moody MD  03/17/22

## 2022-03-21 ENCOUNTER — ANESTHESIA EVENT (OUTPATIENT)
Dept: SURGERY | Facility: HOSPITAL | Age: 57
DRG: 236 | End: 2022-03-21
Payer: COMMERCIAL

## 2022-03-21 ENCOUNTER — NURSE ONLY (OUTPATIENT)
Dept: LAB | Facility: HOSPITAL | Age: 57
End: 2022-03-21
Attending: THORACIC SURGERY (CARDIOTHORACIC VASCULAR SURGERY)
Payer: COMMERCIAL

## 2022-03-21 ENCOUNTER — HOSPITAL ENCOUNTER (OUTPATIENT)
Dept: ULTRASOUND IMAGING | Facility: HOSPITAL | Age: 57
Discharge: HOME | End: 2022-03-21
Attending: PHYSICIAN ASSISTANT
Payer: COMMERCIAL

## 2022-03-21 ENCOUNTER — HOSPITAL ENCOUNTER (OUTPATIENT)
Dept: CV DIAGNOSTICS | Facility: HOSPITAL | Age: 57
Discharge: HOME | End: 2022-03-21
Attending: PHYSICIAN ASSISTANT
Payer: COMMERCIAL

## 2022-03-21 DIAGNOSIS — Z01.818 PRE-OP TESTING: Primary | ICD-10-CM

## 2022-03-21 DIAGNOSIS — Z01.818 PRE-OP TESTING: ICD-10-CM

## 2022-03-21 DIAGNOSIS — I65.23 BILATERAL CAROTID ARTERY STENOSIS: ICD-10-CM

## 2022-03-21 DIAGNOSIS — R42 DIZZINESS: ICD-10-CM

## 2022-03-21 DIAGNOSIS — I35.0 NONRHEUMATIC AORTIC VALVE STENOSIS: ICD-10-CM

## 2022-03-21 LAB
ALBUMIN SERPL-MCNC: 3.7 G/DL (ref 3.4–5)
ALBUMIN/GLOB SERPL: 1.3 {RATIO} (ref 1–2)
ALP LIVER SERPL-CCNC: 76 U/L
ALT SERPL-CCNC: 31 U/L
ANION GAP SERPL CALC-SCNC: 6 MMOL/L (ref 0–18)
ANTIBODY SCREEN: NEGATIVE
APTT PPP: 33.2 SECONDS (ref 23.3–35.6)
AST SERPL-CCNC: 13 U/L (ref 15–37)
BASOPHILS # BLD AUTO: 0.03 X10(3) UL (ref 0–0.2)
BILIRUB SERPL-MCNC: 0.5 MG/DL (ref 0.1–2)
BILIRUB UR QL: NEGATIVE
BUN BLD-MCNC: 24 MG/DL (ref 7–18)
BUN/CREAT SERPL: 25.3 (ref 10–20)
CALCIUM BLD-MCNC: 8.8 MG/DL (ref 8.5–10.1)
CHLORIDE SERPL-SCNC: 105 MMOL/L (ref 98–112)
CLARITY UR: CLEAR
CO2 SERPL-SCNC: 30 MMOL/L (ref 21–32)
COLOR UR: YELLOW
CREAT BLD-MCNC: 0.95 MG/DL
DEPRECATED RDW RBC AUTO: 45.7 FL (ref 35.1–46.3)
EOSINOPHIL # BLD AUTO: 0.05 X10(3) UL (ref 0–0.7)
EOSINOPHIL NFR BLD AUTO: 0.8 %
ERYTHROCYTE [DISTWIDTH] IN BLOOD BY AUTOMATED COUNT: 13.2 % (ref 11–15)
EST. AVERAGE GLUCOSE BLD GHB EST-MCNC: 120 MG/DL (ref 68–126)
FASTING STATUS PATIENT QL REPORTED: NO
GLOBULIN PLAS-MCNC: 2.9 G/DL (ref 2.8–4.4)
GLUCOSE BLD-MCNC: 116 MG/DL (ref 70–99)
GLUCOSE UR-MCNC: NEGATIVE MG/DL
HBA1C MFR BLD: 5.8 % (ref ?–5.7)
HCT VFR BLD AUTO: 44.6 %
HGB BLD-MCNC: 14.1 G/DL
HGB UR QL STRIP.AUTO: NEGATIVE
IMM GRANULOCYTES # BLD AUTO: 0.02 X10(3) UL (ref 0–1)
IMM GRANULOCYTES NFR BLD: 0.3 %
INR BLD: 1.09 (ref 0.8–1.2)
KETONES UR-MCNC: NEGATIVE MG/DL
LEUKOCYTE ESTERASE UR QL STRIP.AUTO: NEGATIVE
LYMPHOCYTES # BLD AUTO: 1.11 X10(3) UL (ref 1–4)
LYMPHOCYTES NFR BLD AUTO: 17.1 %
MAGNESIUM SERPL-MCNC: 2.5 MG/DL (ref 1.6–2.6)
MCH RBC QN AUTO: 29.7 PG (ref 26–34)
MCHC RBC AUTO-ENTMCNC: 31.6 G/DL (ref 31–37)
MCV RBC AUTO: 93.9 FL
MONOCYTES # BLD AUTO: 0.42 X10(3) UL (ref 0.1–1)
MONOCYTES NFR BLD AUTO: 6.5 %
MRSA DNA SPEC QL NAA+PROBE: NEGATIVE
NEUTROPHILS # BLD AUTO: 4.85 X10 (3) UL (ref 1.5–7.7)
NEUTROPHILS # BLD AUTO: 4.85 X10(3) UL (ref 1.5–7.7)
NEUTROPHILS NFR BLD AUTO: 74.8 %
NITRITE UR QL STRIP.AUTO: NEGATIVE
OSMOLALITY SERPL CALC.SUM OF ELEC: 297 MOSM/KG (ref 275–295)
PH UR: 6 [PH] (ref 5–8)
PLATELET # BLD AUTO: 214 10(3)UL (ref 150–450)
POTASSIUM SERPL-SCNC: 4.7 MMOL/L (ref 3.5–5.1)
PROT SERPL-MCNC: 6.6 G/DL (ref 6.4–8.2)
PROT UR-MCNC: NEGATIVE MG/DL
PROTHROMBIN TIME: 14.3 SECONDS (ref 11.6–14.8)
RBC # BLD AUTO: 4.75 X10(6)UL
RH BLOOD TYPE: POSITIVE
RH BLOOD TYPE: POSITIVE
SARS-COV-2 RNA RESP QL NAA+PROBE: NOT DETECTED
SODIUM SERPL-SCNC: 141 MMOL/L (ref 136–145)
SP GR UR STRIP: 1.02 (ref 1–1.03)
UROBILINOGEN UR STRIP-ACNC: <2
VIT C UR-MCNC: NEGATIVE MG/DL
WBC # BLD AUTO: 6.5 X10(3) UL (ref 4–11)

## 2022-03-21 PROCEDURE — 86900 BLOOD TYPING SEROLOGIC ABO: CPT

## 2022-03-21 PROCEDURE — 85025 COMPLETE CBC W/AUTO DIFF WBC: CPT

## 2022-03-21 PROCEDURE — 36415 COLL VENOUS BLD VENIPUNCTURE: CPT

## 2022-03-21 PROCEDURE — 85730 THROMBOPLASTIN TIME PARTIAL: CPT

## 2022-03-21 PROCEDURE — 93306 TTE W/DOPPLER COMPLETE: CPT | Performed by: PHYSICIAN ASSISTANT

## 2022-03-21 PROCEDURE — 87641 MR-STAPH DNA AMP PROBE: CPT

## 2022-03-21 PROCEDURE — 83735 ASSAY OF MAGNESIUM: CPT

## 2022-03-21 PROCEDURE — 86850 RBC ANTIBODY SCREEN: CPT

## 2022-03-21 PROCEDURE — 85610 PROTHROMBIN TIME: CPT

## 2022-03-21 PROCEDURE — 81003 URINALYSIS AUTO W/O SCOPE: CPT

## 2022-03-21 PROCEDURE — 80053 COMPREHEN METABOLIC PANEL: CPT

## 2022-03-21 PROCEDURE — 93880 EXTRACRANIAL BILAT STUDY: CPT | Performed by: PHYSICIAN ASSISTANT

## 2022-03-21 PROCEDURE — 83036 HEMOGLOBIN GLYCOSYLATED A1C: CPT

## 2022-03-21 PROCEDURE — 86901 BLOOD TYPING SEROLOGIC RH(D): CPT

## 2022-03-23 ENCOUNTER — HOSPITAL ENCOUNTER (OUTPATIENT)
Dept: INTERVENTIONAL RADIOLOGY/VASCULAR | Facility: HOSPITAL | Age: 57
Discharge: HOME | DRG: 236 | End: 2022-03-23
Attending: THORACIC SURGERY (CARDIOTHORACIC VASCULAR SURGERY)
Payer: COMMERCIAL

## 2022-03-23 NOTE — CM/SW NOTE
03/23/22 1000   CM/SW Referral Data   Referral Source Physician   Reason for Referral Discharge planning   Informant Patient   Patient Info   Advanced directives? Yes   Patient's Current Mental Status at Time of Assessment Alert;Oriented   Patient's Home Environment [facility]   Number of Levels in Home 2   Patient lives with Spouse/Significant other   Patient Status Prior to Admission   Independent with ADLs and Mobility Yes   Discharge Needs   Anticipated D/C needs Home health care   Choice of Post-Acute Provider   Informed patient of right to choose their preferred provider Yes     Met with patient during his preadmission testing appointment in [facility]. Pt scheduled for CABG tomorrow 3/23/2022 with Dr Britney Peres    Pt reports he is independent with all ADLs/IADLs and [facility]. Will send referrals once pt is admitted    HCPOA document reviewed with patient and they will take home to read and fill out. / to remain available for support and/or discharge planning.      Theodora HERNANDEZA MSN, RN CTL/  [id number]

## 2022-03-23 NOTE — PAT NURSING NOTE
Pt is having CABG  with Dr. Philipp English  On date:   3/24/22. Preadmission testing performed today with patient. Written and verbal information provided re: bhumika operative expectations with all questions answered. Pt and wife, Lakeisha Michaud, who came with pt verbalized understanding. Consents obtained. STS Risk score discussed with patient and wife. Pre- Op instructions provided for patient's surgery on  3/24/22 . They both verbalized understanding. Pre-admission test results reviewed with STEPHEN Waterman. Patient instruted report to Surgery check-in tomorrow 3/24/22 at 0730.

## 2022-03-24 ENCOUNTER — HOSPITAL ENCOUNTER (INPATIENT)
Facility: HOSPITAL | Age: 57
LOS: 4 days | Discharge: HOME HEALTH | DRG: 236 | End: 2022-03-28
Attending: THORACIC SURGERY (CARDIOTHORACIC VASCULAR SURGERY) | Admitting: THORACIC SURGERY (CARDIOTHORACIC VASCULAR SURGERY)
Payer: COMMERCIAL

## 2022-03-24 ENCOUNTER — APPOINTMENT (OUTPATIENT)
Dept: GENERAL RADIOLOGY | Facility: HOSPITAL | Age: 57
DRG: 236 | End: 2022-03-24
Attending: CLINICAL NURSE SPECIALIST
Payer: COMMERCIAL

## 2022-03-24 ENCOUNTER — ANESTHESIA (OUTPATIENT)
Dept: SURGERY | Facility: HOSPITAL | Age: 57
DRG: 236 | End: 2022-03-24
Payer: COMMERCIAL

## 2022-03-24 DIAGNOSIS — I25.118 CORONARY ARTERY DISEASE OF NATIVE ARTERY OF NATIVE HEART WITH STABLE ANGINA PECTORIS (HCC): ICD-10-CM

## 2022-03-24 DIAGNOSIS — Z01.818 PRE-OP TESTING: Primary | ICD-10-CM

## 2022-03-24 PROBLEM — I25.10 CAD (CORONARY ARTERY DISEASE): Status: ACTIVE | Noted: 2022-03-24

## 2022-03-24 LAB
ANION GAP SERPL CALC-SCNC: 6 MMOL/L (ref 0–18)
APTT PPP: 31.2 SECONDS (ref 23.3–35.6)
APTT PPP: 32.9 SECONDS (ref 23.3–35.6)
APTT PPP: 38.8 SECONDS (ref 23.3–35.6)
BASE EXCESS BLD CALC-SCNC: 1.2 MMOL/L (ref ?–2)
BASE EXCESS BLDA CALC-SCNC: -1 MMOL/L (ref ?–30)
BASE EXCESS BLDA CALC-SCNC: 0 MMOL/L (ref ?–30)
BASE EXCESS BLDA CALC-SCNC: 1 MMOL/L (ref ?–30)
BASE EXCESS BLDA CALC-SCNC: 1 MMOL/L (ref ?–30)
BASE EXCESS BLDA CALC-SCNC: 2 MMOL/L (ref ?–30)
BLOOD TYPE BARCODE: 7300
BUN BLD-MCNC: 15 MG/DL (ref 7–18)
BUN/CREAT SERPL: 14.6 (ref 10–20)
CA-I BLD-SCNC: 1.22 MMOL/L (ref 0.95–1.32)
CA-I BLDA-SCNC: 0.85 MMOL/L (ref 1.12–1.32)
CA-I BLDA-SCNC: 1.01 MMOL/L (ref 1.12–1.32)
CA-I BLDA-SCNC: 1.05 MMOL/L (ref 1.12–1.32)
CA-I BLDA-SCNC: 1.06 MMOL/L (ref 1.12–1.32)
CA-I BLDA-SCNC: 1.06 MMOL/L (ref 1.12–1.32)
CA-I BLDA-SCNC: 1.1 MMOL/L (ref 1.12–1.32)
CA-I BLDA-SCNC: 1.12 MMOL/L (ref 1.12–1.32)
CA-I BLDA-SCNC: 1.16 MMOL/L (ref 1.12–1.32)
CALCIUM BLD-MCNC: 9.2 MG/DL (ref 8.5–10.1)
CHLORIDE SERPL-SCNC: 114 MMOL/L (ref 98–112)
CO2 BLDA-SCNC: 25 MMOL/L (ref 22–32)
CO2 BLDA-SCNC: 26 MMOL/L (ref 22–32)
CO2 BLDA-SCNC: 26 MMOL/L (ref 22–32)
CO2 BLDA-SCNC: 27 MMOL/L (ref 22–32)
CO2 BLDA-SCNC: 27 MMOL/L (ref 22–32)
CO2 BLDA-SCNC: 28 MMOL/L (ref 22–32)
CO2 SERPL-SCNC: 25 MMOL/L (ref 21–32)
COHGB MFR BLD: 1.5 % (ref 0–3)
CREAT BLD-MCNC: 1.03 MG/DL
D DIMER PPP FEU-MCNC: 0.36 UG/ML FEU (ref ?–0.54)
D DIMER PPP FEU-MCNC: 0.65 UG/ML FEU (ref ?–0.54)
DEPRECATED RDW RBC AUTO: 42.9 FL (ref 35.1–46.3)
ERYTHROCYTE [DISTWIDTH] IN BLOOD BY AUTOMATED COUNT: 13.1 % (ref 11–15)
FIBRINOGEN PPP-MCNC: 112 MG/DL (ref 180–480)
FIBRINOGEN PPP-MCNC: 274 MG/DL (ref 180–480)
FIBRINOGEN PPP-MCNC: 313 MG/DL (ref 180–480)
GLUCOSE BLD-MCNC: 61 MG/DL (ref 70–99)
GLUCOSE BLDA-MCNC: 123 MG/DL (ref 70–99)
GLUCOSE BLDA-MCNC: 123 MG/DL (ref 70–99)
GLUCOSE BLDA-MCNC: 130 MG/DL (ref 70–99)
GLUCOSE BLDA-MCNC: 138 MG/DL (ref 70–99)
GLUCOSE BLDA-MCNC: 166 MG/DL (ref 70–99)
GLUCOSE BLDA-MCNC: 177 MG/DL (ref 70–99)
GLUCOSE BLDA-MCNC: 192 MG/DL (ref 70–99)
GLUCOSE BLDA-MCNC: 202 MG/DL (ref 70–99)
GLUCOSE BLDC GLUCOMTR-MCNC: 107 MG/DL (ref 70–99)
GLUCOSE BLDC GLUCOMTR-MCNC: 113 MG/DL (ref 70–99)
GLUCOSE BLDC GLUCOMTR-MCNC: 114 MG/DL (ref 70–99)
GLUCOSE BLDC GLUCOMTR-MCNC: 128 MG/DL (ref 70–99)
GLUCOSE BLDC GLUCOMTR-MCNC: 130 MG/DL (ref 70–99)
GLUCOSE BLDC GLUCOMTR-MCNC: 132 MG/DL (ref 70–99)
GLUCOSE BLDC GLUCOMTR-MCNC: 133 MG/DL (ref 70–99)
GLUCOSE BLDC GLUCOMTR-MCNC: 134 MG/DL (ref 70–99)
GLUCOSE BLDC GLUCOMTR-MCNC: 191 MG/DL (ref 70–99)
GLUCOSE BLDC GLUCOMTR-MCNC: 239 MG/DL (ref 70–99)
GLUCOSE BLDC GLUCOMTR-MCNC: 56 MG/DL (ref 70–99)
GLUCOSE BLDC GLUCOMTR-MCNC: 99 MG/DL (ref 70–99)
HCO3 BLDA-SCNC: 24.4 MEQ/L (ref 22–26)
HCO3 BLDA-SCNC: 25 MEQ/L (ref 22–26)
HCO3 BLDA-SCNC: 25.4 MEQ/L (ref 22–26)
HCO3 BLDA-SCNC: 25.9 MEQ/L (ref 21–27)
HCO3 BLDA-SCNC: 25.9 MEQ/L (ref 22–26)
HCO3 BLDA-SCNC: 26.3 MEQ/L (ref 22–26)
HCO3 BLDA-SCNC: 26.4 MEQ/L (ref 22–26)
HCO3 BLDA-SCNC: 26.5 MEQ/L (ref 22–26)
HCT VFR BLD AUTO: 24.8 %
HCT VFR BLDA CALC: 20 %
HCT VFR BLDA CALC: 22 %
HCT VFR BLDA CALC: 23 %
HCT VFR BLDA CALC: 24 %
HCT VFR BLDA CALC: 29 %
HCT VFR BLDA CALC: 35 %
HCT VFR BLDA CALC: 36 %
HGB BLD-MCNC: 8.7 G/DL
INR BLD: 0.72 (ref 0.8–1.2)
INR BLD: 1.28 (ref 0.8–1.2)
INR BLD: 2.31 (ref 0.8–1.2)
ISTAT ACTIVATED CLOTTING TIME: 130 SECONDS (ref 125–137)
ISTAT ACTIVATED CLOTTING TIME: 131 SECONDS (ref 125–137)
ISTAT ACTIVATED CLOTTING TIME: 470 SECONDS (ref 125–137)
ISTAT ACTIVATED CLOTTING TIME: 565 SECONDS (ref 125–137)
ISTAT ACTIVATED CLOTTING TIME: <125 SECONDS (ref 125–137)
ISTAT ACTIVATED CLOTTING TIME: >675 SECONDS (ref 125–137)
ISTAT ACTIVATED CLOTTING TIME: >675 SECONDS (ref 125–137)
LACTATE BLD-SCNC: 2.2 MMOL/L (ref 0.5–2)
MAGNESIUM SERPL-MCNC: 2.5 MG/DL (ref 1.6–2.6)
MCH RBC QN AUTO: 30.6 PG (ref 26–34)
MCHC RBC AUTO-ENTMCNC: 34.3 G/DL (ref 31–37)
MCV RBC AUTO: 89.2 FL
METHGB MFR BLD: 1 % SAT (ref 0.4–1.5)
O2 CT BLD-SCNC: 12.5 VOL% (ref 15–23)
O2/TOTAL GAS SETTING VFR VENT: 50 %
OSMOLALITY SERPL CALC.SUM OF ELEC: 299 MOSM/KG (ref 275–295)
PCO2 BLDA: 31 MM HG (ref 35–45)
PCO2 BLDA: 37.2 MMHG (ref 35–45)
PCO2 BLDA: 38.1 MMHG (ref 35–45)
PCO2 BLDA: 41.8 MMHG (ref 35–45)
PCO2 BLDA: 42.6 MMHG (ref 35–45)
PCO2 BLDA: 43.2 MMHG (ref 35–45)
PCO2 BLDA: 46.2 MMHG (ref 35–45)
PCO2 BLDA: 46.9 MMHG (ref 35–45)
PCO2 BLDA: 47.4 MMHG (ref 35–45)
PEEP SETTING VENT: 8 CM H2O
PH BLDA: 7.34 [PH] (ref 7.35–7.45)
PH BLDA: 7.35 [PH] (ref 7.35–7.45)
PH BLDA: 7.37 [PH] (ref 7.35–7.45)
PH BLDA: 7.38 [PH] (ref 7.35–7.45)
PH BLDA: 7.4 [PH] (ref 7.35–7.45)
PH BLDA: 7.4 [PH] (ref 7.35–7.45)
PH BLDA: 7.41 [PH] (ref 7.35–7.45)
PH BLDA: 7.43 [PH] (ref 7.35–7.45)
PH BLDA: 7.5 [PH] (ref 7.35–7.45)
PLATELET # BLD AUTO: 107 10(3)UL (ref 150–450)
PLATELET # BLD AUTO: 149 10(3)UL (ref 150–450)
PO2 BLDA: 226 MM HG (ref 80–100)
PO2 BLDA: >400 MMHG (ref 80–105)
POTASSIUM BLD-SCNC: 3.1 MMOL/L (ref 3.6–5.1)
POTASSIUM SERPL-SCNC: 3.3 MMOL/L (ref 3.5–5.1)
PROTHROMBIN TIME: 16.1 SECONDS (ref 11.6–14.8)
PROTHROMBIN TIME: 25.7 SECONDS (ref 11.6–14.8)
PROTHROMBIN TIME: <10.5 SECONDS (ref 11.6–14.8)
PUNCTURE CHARGE: NO
RBC # BLD AUTO: 2.78 X10(6)UL
RESP RATE: 12 BPM
SAO2 % BLDA: 100 % (ref 92–100)
SAO2 % BLDA: >99 % (ref 94–100)
SODIUM BLD-SCNC: 143 MMOL/L (ref 135–145)
SODIUM BLDA-SCNC: 139 MMOL/L (ref 136–145)
SODIUM BLDA-SCNC: 139 MMOL/L (ref 136–145)
SODIUM BLDA-SCNC: 141 MMOL/L (ref 136–145)
SODIUM BLDA-SCNC: 142 MMOL/L (ref 136–145)
SODIUM BLDA-SCNC: 144 MMOL/L (ref 136–145)
SODIUM BLDA-SCNC: 145 MMOL/L (ref 136–145)
SODIUM BLDA-SCNC: 146 MMOL/L (ref 136–145)
SODIUM BLDA-SCNC: 3.4 MMOL/L (ref 3.6–5.1)
SODIUM BLDA-SCNC: 3.5 MMOL/L (ref 3.6–5.1)
SODIUM BLDA-SCNC: 3.7 MMOL/L (ref 3.6–5.1)
SODIUM BLDA-SCNC: 4.2 MMOL/L (ref 3.6–5.1)
SODIUM BLDA-SCNC: 4.8 MMOL/L (ref 3.6–5.1)
SODIUM BLDA-SCNC: 5 MMOL/L (ref 3.6–5.1)
SODIUM BLDA-SCNC: 5.1 MMOL/L (ref 3.6–5.1)
SODIUM SERPL-SCNC: 145 MMOL/L (ref 136–145)
SPECIMEN VOL 24H UR: 600 ML
WBC # BLD AUTO: 6.2 X10(3) UL (ref 4–11)

## 2022-03-24 PROCEDURE — 021209W BYPASS CORONARY ARTERY, THREE ARTERIES FROM AORTA WITH AUTOLOGOUS VENOUS TISSUE, OPEN APPROACH: ICD-10-PCS | Performed by: THORACIC SURGERY (CARDIOTHORACIC VASCULAR SURGERY)

## 2022-03-24 PROCEDURE — B24BZZ4 ULTRASONOGRAPHY OF HEART WITH AORTA, TRANSESOPHAGEAL: ICD-10-PCS | Performed by: ANESTHESIOLOGY

## 2022-03-24 PROCEDURE — 36430 TRANSFUSION BLD/BLD COMPNT: CPT | Performed by: ANESTHESIOLOGY

## 2022-03-24 PROCEDURE — 5A1221Z PERFORMANCE OF CARDIAC OUTPUT, CONTINUOUS: ICD-10-PCS | Performed by: THORACIC SURGERY (CARDIOTHORACIC VASCULAR SURGERY)

## 2022-03-24 PROCEDURE — 30233N1 TRANSFUSION OF NONAUTOLOGOUS RED BLOOD CELLS INTO PERIPHERAL VEIN, PERCUTANEOUS APPROACH: ICD-10-PCS | Performed by: THORACIC SURGERY (CARDIOTHORACIC VASCULAR SURGERY)

## 2022-03-24 PROCEDURE — 06BQ4ZZ EXCISION OF LEFT SAPHENOUS VEIN, PERCUTANEOUS ENDOSCOPIC APPROACH: ICD-10-PCS | Performed by: THORACIC SURGERY (CARDIOTHORACIC VASCULAR SURGERY)

## 2022-03-24 PROCEDURE — 93312 ECHO TRANSESOPHAGEAL: CPT | Performed by: ANESTHESIOLOGY

## 2022-03-24 PROCEDURE — 30233R1 TRANSFUSION OF NONAUTOLOGOUS PLATELETS INTO PERIPHERAL VEIN, PERCUTANEOUS APPROACH: ICD-10-PCS | Performed by: THORACIC SURGERY (CARDIOTHORACIC VASCULAR SURGERY)

## 2022-03-24 PROCEDURE — 71045 X-RAY EXAM CHEST 1 VIEW: CPT | Performed by: CLINICAL NURSE SPECIALIST

## 2022-03-24 PROCEDURE — 99291 CRITICAL CARE FIRST HOUR: CPT | Performed by: INTERNAL MEDICINE

## 2022-03-24 PROCEDURE — 30233D1 TRANSFUSION OF NONAUTOLOGOUS PATHOGEN REDUCED CRYOPRECIPITATED FIBRINOGEN COMPLEX INTO PERIPHERAL VEIN, PERCUTANEOUS APPROACH: ICD-10-PCS | Performed by: THORACIC SURGERY (CARDIOTHORACIC VASCULAR SURGERY)

## 2022-03-24 PROCEDURE — XW03372 INTRODUCTION OF INACTIVATED COAGULATION FACTOR XA INTO PERIPHERAL VEIN, PERCUTANEOUS APPROACH, NEW TECHNOLOGY GROUP 2: ICD-10-PCS | Performed by: THORACIC SURGERY (CARDIOTHORACIC VASCULAR SURGERY)

## 2022-03-24 PROCEDURE — 76942 ECHO GUIDE FOR BIOPSY: CPT | Performed by: ANESTHESIOLOGY

## 2022-03-24 PROCEDURE — 30233K1 TRANSFUSION OF NONAUTOLOGOUS FROZEN PLASMA INTO PERIPHERAL VEIN, PERCUTANEOUS APPROACH: ICD-10-PCS | Performed by: THORACIC SURGERY (CARDIOTHORACIC VASCULAR SURGERY)

## 2022-03-24 PROCEDURE — 30233L1 TRANSFUSION OF NONAUTOLOGOUS FRESH PLASMA INTO PERIPHERAL VEIN, PERCUTANEOUS APPROACH: ICD-10-PCS | Performed by: THORACIC SURGERY (CARDIOTHORACIC VASCULAR SURGERY)

## 2022-03-24 PROCEDURE — 02100Z9 BYPASS CORONARY ARTERY, ONE ARTERY FROM LEFT INTERNAL MAMMARY, OPEN APPROACH: ICD-10-PCS | Performed by: THORACIC SURGERY (CARDIOTHORACIC VASCULAR SURGERY)

## 2022-03-24 RX ORDER — SENNOSIDES 8.6 MG
17.2 TABLET ORAL NIGHTLY PRN
Status: DISCONTINUED | OUTPATIENT
Start: 2022-03-24 | End: 2022-03-24

## 2022-03-24 RX ORDER — FAMOTIDINE 20 MG/1
20 TABLET, FILM COATED ORAL ONCE
Status: COMPLETED | OUTPATIENT
Start: 2022-03-24 | End: 2022-03-24

## 2022-03-24 RX ORDER — DEXMEDETOMIDINE HYDROCHLORIDE 4 UG/ML
INJECTION, SOLUTION INTRAVENOUS CONTINUOUS
Status: DISCONTINUED | OUTPATIENT
Start: 2022-03-24 | End: 2022-03-24

## 2022-03-24 RX ORDER — CEFAZOLIN SODIUM/WATER 2 G/20 ML
2 SYRINGE (ML) INTRAVENOUS
Status: COMPLETED | OUTPATIENT
Start: 2022-03-24 | End: 2022-03-24

## 2022-03-24 RX ORDER — HYDROMORPHONE HYDROCHLORIDE 1 MG/ML
1.2 INJECTION, SOLUTION INTRAMUSCULAR; INTRAVENOUS; SUBCUTANEOUS EVERY 2 HOUR PRN
Status: DISCONTINUED | OUTPATIENT
Start: 2022-03-24 | End: 2022-03-26

## 2022-03-24 RX ORDER — DOBUTAMINE HYDROCHLORIDE 200 MG/100ML
INJECTION INTRAVENOUS CONTINUOUS PRN
Status: DISCONTINUED | OUTPATIENT
Start: 2022-03-24 | End: 2022-03-26

## 2022-03-24 RX ORDER — HEPARIN SODIUM 1000 [USP'U]/ML
INJECTION, SOLUTION INTRAVENOUS; SUBCUTANEOUS AS NEEDED
Status: DISCONTINUED | OUTPATIENT
Start: 2022-03-24 | End: 2022-03-24 | Stop reason: SURG

## 2022-03-24 RX ORDER — DEXMEDETOMIDINE HYDROCHLORIDE 4 UG/ML
INJECTION, SOLUTION INTRAVENOUS CONTINUOUS
Status: DISCONTINUED | OUTPATIENT
Start: 2022-03-24 | End: 2022-03-26

## 2022-03-24 RX ORDER — CEFAZOLIN SODIUM 1 G/3ML
INJECTION, POWDER, FOR SOLUTION INTRAMUSCULAR; INTRAVENOUS AS NEEDED
Status: DISCONTINUED | OUTPATIENT
Start: 2022-03-24 | End: 2022-03-24 | Stop reason: HOSPADM

## 2022-03-24 RX ORDER — DEXTROSE MONOHYDRATE 25 G/50ML
INJECTION, SOLUTION INTRAVENOUS
Status: COMPLETED
Start: 2022-03-24 | End: 2022-03-24

## 2022-03-24 RX ORDER — POTASSIUM CHLORIDE 14.9 MG/ML
20 INJECTION INTRAVENOUS AS NEEDED
Status: DISCONTINUED | OUTPATIENT
Start: 2022-03-24 | End: 2022-03-28

## 2022-03-24 RX ORDER — GABAPENTIN 300 MG/1
300 CAPSULE ORAL DAILY
Status: COMPLETED | OUTPATIENT
Start: 2022-03-25 | End: 2022-03-26

## 2022-03-24 RX ORDER — SODIUM PHOSPHATE, DIBASIC AND SODIUM PHOSPHATE, MONOBASIC 7; 19 G/133ML; G/133ML
1 ENEMA RECTAL ONCE AS NEEDED
Status: DISCONTINUED | OUTPATIENT
Start: 2022-03-24 | End: 2022-03-28

## 2022-03-24 RX ORDER — SENNOSIDES 8.6 MG
8.6 TABLET ORAL 2 TIMES DAILY
Status: DISCONTINUED | OUTPATIENT
Start: 2022-03-25 | End: 2022-03-28

## 2022-03-24 RX ORDER — NITROGLYCERIN 20 MG/100ML
INJECTION INTRAVENOUS CONTINUOUS PRN
Status: DISCONTINUED | OUTPATIENT
Start: 2022-03-24 | End: 2022-03-26

## 2022-03-24 RX ORDER — PROCHLORPERAZINE EDISYLATE 5 MG/ML
5 INJECTION INTRAMUSCULAR; INTRAVENOUS EVERY 8 HOURS PRN
Status: DISCONTINUED | OUTPATIENT
Start: 2022-03-24 | End: 2022-03-28

## 2022-03-24 RX ORDER — HYDROCODONE BITARTRATE AND ACETAMINOPHEN 5; 325 MG/1; MG/1
2 TABLET ORAL EVERY 4 HOURS PRN
Status: DISCONTINUED | OUTPATIENT
Start: 2022-03-24 | End: 2022-03-28

## 2022-03-24 RX ORDER — HYDROMORPHONE HYDROCHLORIDE 1 MG/ML
0.4 INJECTION, SOLUTION INTRAMUSCULAR; INTRAVENOUS; SUBCUTANEOUS EVERY 2 HOUR PRN
Status: DISCONTINUED | OUTPATIENT
Start: 2022-03-24 | End: 2022-03-28

## 2022-03-24 RX ORDER — ALBUMIN, HUMAN INJ 5% 5 %
SOLUTION INTRAVENOUS
Status: COMPLETED
Start: 2022-03-24 | End: 2022-03-24

## 2022-03-24 RX ORDER — ENOXAPARIN SODIUM 100 MG/ML
40 INJECTION SUBCUTANEOUS DAILY
Status: DISCONTINUED | OUTPATIENT
Start: 2022-03-25 | End: 2022-03-28

## 2022-03-24 RX ORDER — FAMOTIDINE 10 MG/ML
20 INJECTION, SOLUTION INTRAVENOUS 2 TIMES DAILY
Status: DISCONTINUED | OUTPATIENT
Start: 2022-03-24 | End: 2022-03-24

## 2022-03-24 RX ORDER — NICOTINE POLACRILEX 4 MG
30 LOZENGE BUCCAL
Status: DISCONTINUED | OUTPATIENT
Start: 2022-03-24 | End: 2022-03-28

## 2022-03-24 RX ORDER — CALCIUM CHLORIDE 100 MG/ML
INJECTION INTRAVENOUS; INTRAVENTRICULAR AS NEEDED
Status: DISCONTINUED | OUTPATIENT
Start: 2022-03-24 | End: 2022-03-24 | Stop reason: SURG

## 2022-03-24 RX ORDER — SENNOSIDES 8.8 MG/5ML
10 LIQUID ORAL NIGHTLY PRN
Status: DISCONTINUED | OUTPATIENT
Start: 2022-03-24 | End: 2022-03-28

## 2022-03-24 RX ORDER — VANCOMYCIN HYDROCHLORIDE 1 G/20ML
INJECTION, POWDER, LYOPHILIZED, FOR SOLUTION INTRAVENOUS AS NEEDED
Status: DISCONTINUED | OUTPATIENT
Start: 2022-03-24 | End: 2022-03-24 | Stop reason: HOSPADM

## 2022-03-24 RX ORDER — ACETAMINOPHEN 10 MG/ML
INJECTION, SOLUTION INTRAVENOUS
Status: COMPLETED
Start: 2022-03-24 | End: 2022-03-24

## 2022-03-24 RX ORDER — FAMOTIDINE 10 MG/ML
INJECTION, SOLUTION INTRAVENOUS
Status: DISCONTINUED
Start: 2022-03-24 | End: 2022-03-24

## 2022-03-24 RX ORDER — MAGNESIUM SULFATE 1 G/100ML
1 INJECTION INTRAVENOUS AS NEEDED
Status: DISCONTINUED | OUTPATIENT
Start: 2022-03-24 | End: 2022-03-28

## 2022-03-24 RX ORDER — CLOPIDOGREL BISULFATE 75 MG/1
75 TABLET ORAL DAILY
Status: DISCONTINUED | OUTPATIENT
Start: 2022-03-25 | End: 2022-03-25

## 2022-03-24 RX ORDER — ACETAMINOPHEN 650 MG/1
650 SUPPOSITORY RECTAL EVERY 4 HOURS PRN
Status: DISCONTINUED | OUTPATIENT
Start: 2022-03-24 | End: 2022-03-28

## 2022-03-24 RX ORDER — ACETAMINOPHEN 160 MG/5ML
650 SOLUTION ORAL EVERY 4 HOURS PRN
Status: DISCONTINUED | OUTPATIENT
Start: 2022-03-24 | End: 2022-03-28

## 2022-03-24 RX ORDER — POLYETHYLENE GLYCOL 3350 17 G/17G
17 POWDER, FOR SOLUTION ORAL DAILY PRN
Status: DISCONTINUED | OUTPATIENT
Start: 2022-03-24 | End: 2022-03-24

## 2022-03-24 RX ORDER — CHLORHEXIDINE GLUCONATE 0.12 MG/ML
15 RINSE ORAL 2 TIMES DAILY
Status: DISCONTINUED | OUTPATIENT
Start: 2022-03-24 | End: 2022-03-24

## 2022-03-24 RX ORDER — NEOSTIGMINE METHYLSULFATE 1 MG/ML
3 INJECTION INTRAVENOUS ONCE
Status: COMPLETED | OUTPATIENT
Start: 2022-03-24 | End: 2022-03-24

## 2022-03-24 RX ORDER — MAGNESIUM SULFATE HEPTAHYDRATE 40 MG/ML
2 INJECTION, SOLUTION INTRAVENOUS AS NEEDED
Status: DISCONTINUED | OUTPATIENT
Start: 2022-03-24 | End: 2022-03-28

## 2022-03-24 RX ORDER — ONDANSETRON 2 MG/ML
4 INJECTION INTRAMUSCULAR; INTRAVENOUS EVERY 6 HOURS PRN
Status: DISCONTINUED | OUTPATIENT
Start: 2022-03-24 | End: 2022-03-28

## 2022-03-24 RX ORDER — LIDOCAINE HYDROCHLORIDE 10 MG/ML
INJECTION, SOLUTION EPIDURAL; INFILTRATION; INTRACAUDAL; PERINEURAL AS NEEDED
Status: DISCONTINUED | OUTPATIENT
Start: 2022-03-24 | End: 2022-03-24 | Stop reason: SURG

## 2022-03-24 RX ORDER — METOCLOPRAMIDE HYDROCHLORIDE 5 MG/ML
10 INJECTION INTRAMUSCULAR; INTRAVENOUS EVERY 6 HOURS
Status: DISCONTINUED | OUTPATIENT
Start: 2022-03-24 | End: 2022-03-28

## 2022-03-24 RX ORDER — DEXTROSE AND SODIUM CHLORIDE 5; .9 G/100ML; G/100ML
INJECTION, SOLUTION INTRAVENOUS CONTINUOUS
Status: DISCONTINUED | OUTPATIENT
Start: 2022-03-24 | End: 2022-03-25

## 2022-03-24 RX ORDER — ALBUMIN, HUMAN INJ 5% 5 %
250 SOLUTION INTRAVENOUS ONCE AS NEEDED
Status: COMPLETED | OUTPATIENT
Start: 2022-03-24 | End: 2022-03-24

## 2022-03-24 RX ORDER — DEXTROSE MONOHYDRATE 25 G/50ML
50 INJECTION, SOLUTION INTRAVENOUS
Status: DISCONTINUED | OUTPATIENT
Start: 2022-03-24 | End: 2022-03-28

## 2022-03-24 RX ORDER — HYDROCODONE BITARTRATE AND ACETAMINOPHEN 5; 325 MG/1; MG/1
1 TABLET ORAL EVERY 4 HOURS PRN
Status: DISCONTINUED | OUTPATIENT
Start: 2022-03-24 | End: 2022-03-28

## 2022-03-24 RX ORDER — ASCORBIC ACID 500 MG
500 TABLET ORAL 3 TIMES DAILY
Status: DISCONTINUED | OUTPATIENT
Start: 2022-03-25 | End: 2022-03-28

## 2022-03-24 RX ORDER — DEXTROSE MONOHYDRATE 25 G/50ML
50 INJECTION, SOLUTION INTRAVENOUS AS NEEDED
Status: DISCONTINUED | OUTPATIENT
Start: 2022-03-24 | End: 2022-03-28

## 2022-03-24 RX ORDER — POTASSIUM CHLORIDE 29.8 MG/ML
40 INJECTION INTRAVENOUS AS NEEDED
Status: DISCONTINUED | OUTPATIENT
Start: 2022-03-24 | End: 2022-03-28

## 2022-03-24 RX ORDER — ACETAMINOPHEN 10 MG/ML
1000 INJECTION, SOLUTION INTRAVENOUS EVERY 6 HOURS PRN
Status: DISCONTINUED | OUTPATIENT
Start: 2022-03-24 | End: 2022-03-28

## 2022-03-24 RX ORDER — MILRINONE LACTATE 0.2 MG/ML
INJECTION, SOLUTION INTRAVENOUS AS NEEDED
Status: DISCONTINUED | OUTPATIENT
Start: 2022-03-24 | End: 2022-03-26

## 2022-03-24 RX ORDER — ACETAMINOPHEN 325 MG/1
650 TABLET ORAL EVERY 4 HOURS PRN
Status: DISCONTINUED | OUTPATIENT
Start: 2022-03-24 | End: 2022-03-24

## 2022-03-24 RX ORDER — MORPHINE SULFATE 2 MG/ML
2 INJECTION, SOLUTION INTRAMUSCULAR; INTRAVENOUS ONCE
Status: COMPLETED | OUTPATIENT
Start: 2022-03-24 | End: 2022-03-24

## 2022-03-24 RX ORDER — METOCLOPRAMIDE 10 MG/1
10 TABLET ORAL ONCE
Status: COMPLETED | OUTPATIENT
Start: 2022-03-24 | End: 2022-03-24

## 2022-03-24 RX ORDER — GABAPENTIN 300 MG/1
300 CAPSULE ORAL ONCE
Status: COMPLETED | OUTPATIENT
Start: 2022-03-24 | End: 2022-03-24

## 2022-03-24 RX ORDER — ACETAMINOPHEN 325 MG/1
650 TABLET ORAL EVERY 4 HOURS PRN
Status: DISCONTINUED | OUTPATIENT
Start: 2022-03-24 | End: 2022-03-28

## 2022-03-24 RX ORDER — FUROSEMIDE 10 MG/ML
INJECTION INTRAMUSCULAR; INTRAVENOUS AS NEEDED
Status: DISCONTINUED | OUTPATIENT
Start: 2022-03-24 | End: 2022-03-24 | Stop reason: SURG

## 2022-03-24 RX ORDER — PROTAMINE SULFATE 10 MG/ML
INJECTION, SOLUTION INTRAVENOUS AS NEEDED
Status: DISCONTINUED | OUTPATIENT
Start: 2022-03-24 | End: 2022-03-24 | Stop reason: SURG

## 2022-03-24 RX ORDER — CHLORHEXIDINE GLUCONATE 0.12 MG/ML
15 RINSE ORAL
Status: DISCONTINUED | OUTPATIENT
Start: 2022-03-24 | End: 2022-03-28

## 2022-03-24 RX ORDER — POLYETHYLENE GLYCOL 3350 17 G/17G
17 POWDER, FOR SOLUTION ORAL DAILY PRN
Status: DISCONTINUED | OUTPATIENT
Start: 2022-03-24 | End: 2022-03-28

## 2022-03-24 RX ORDER — SODIUM CHLORIDE 9 MG/ML
83 INJECTION, SOLUTION INTRAVENOUS CONTINUOUS
Status: DISCONTINUED | OUTPATIENT
Start: 2022-03-24 | End: 2022-03-26

## 2022-03-24 RX ORDER — HYDROMORPHONE HYDROCHLORIDE 1 MG/ML
0.8 INJECTION, SOLUTION INTRAMUSCULAR; INTRAVENOUS; SUBCUTANEOUS EVERY 2 HOUR PRN
Status: DISCONTINUED | OUTPATIENT
Start: 2022-03-24 | End: 2022-03-28

## 2022-03-24 RX ORDER — DOCUSATE SODIUM 100 MG/1
100 CAPSULE, LIQUID FILLED ORAL 2 TIMES DAILY
Status: DISCONTINUED | OUTPATIENT
Start: 2022-03-25 | End: 2022-03-28

## 2022-03-24 RX ORDER — CEFAZOLIN SODIUM/WATER 2 G/20 ML
2 SYRINGE (ML) INTRAVENOUS EVERY 8 HOURS
Status: COMPLETED | OUTPATIENT
Start: 2022-03-24 | End: 2022-03-26

## 2022-03-24 RX ORDER — FAMOTIDINE 20 MG/1
20 TABLET, FILM COATED ORAL 2 TIMES DAILY
Status: DISCONTINUED | OUTPATIENT
Start: 2022-03-24 | End: 2022-03-24

## 2022-03-24 RX ORDER — ASCORBIC ACID 500 MG
1000 TABLET ORAL ONCE
Status: COMPLETED | OUTPATIENT
Start: 2022-03-24 | End: 2022-03-24

## 2022-03-24 RX ORDER — GLYCOPYRROLATE 0.2 MG/ML
0.6 INJECTION, SOLUTION INTRAMUSCULAR; INTRAVENOUS ONCE
Status: COMPLETED | OUTPATIENT
Start: 2022-03-24 | End: 2022-03-24

## 2022-03-24 RX ORDER — LORAZEPAM 1 MG/1
1 TABLET ORAL ONCE
Status: COMPLETED | OUTPATIENT
Start: 2022-03-24 | End: 2022-03-24

## 2022-03-24 RX ORDER — MIDAZOLAM HYDROCHLORIDE 1 MG/ML
INJECTION INTRAMUSCULAR; INTRAVENOUS AS NEEDED
Status: DISCONTINUED | OUTPATIENT
Start: 2022-03-24 | End: 2022-03-24 | Stop reason: SURG

## 2022-03-24 RX ORDER — BISACODYL 10 MG
10 SUPPOSITORY, RECTAL RECTAL
Status: DISCONTINUED | OUTPATIENT
Start: 2022-03-24 | End: 2022-03-28

## 2022-03-24 RX ORDER — KETOROLAC TROMETHAMINE 30 MG/ML
30 INJECTION, SOLUTION INTRAMUSCULAR; INTRAVENOUS EVERY 6 HOURS PRN
Status: DISCONTINUED | OUTPATIENT
Start: 2022-03-24 | End: 2022-03-24

## 2022-03-24 RX ORDER — ACETAMINOPHEN 10 MG/ML
1000 INJECTION, SOLUTION INTRAVENOUS EVERY 8 HOURS
Status: COMPLETED | OUTPATIENT
Start: 2022-03-24 | End: 2022-03-25

## 2022-03-24 RX ORDER — ROCURONIUM BROMIDE 10 MG/ML
INJECTION, SOLUTION INTRAVENOUS AS NEEDED
Status: DISCONTINUED | OUTPATIENT
Start: 2022-03-24 | End: 2022-03-24 | Stop reason: SURG

## 2022-03-24 RX ORDER — DOXEPIN HYDROCHLORIDE 50 MG/1
1 CAPSULE ORAL DAILY
Status: DISCONTINUED | OUTPATIENT
Start: 2022-03-25 | End: 2022-03-28

## 2022-03-24 RX ORDER — NICOTINE POLACRILEX 4 MG
15 LOZENGE BUCCAL
Status: DISCONTINUED | OUTPATIENT
Start: 2022-03-24 | End: 2022-03-28

## 2022-03-24 RX ORDER — MELATONIN
3 NIGHTLY PRN
Status: DISCONTINUED | OUTPATIENT
Start: 2022-03-25 | End: 2022-03-28

## 2022-03-24 RX ORDER — METOCLOPRAMIDE HYDROCHLORIDE 5 MG/ML
INJECTION INTRAMUSCULAR; INTRAVENOUS
Status: COMPLETED
Start: 2022-03-24 | End: 2022-03-24

## 2022-03-24 RX ORDER — BISACODYL 10 MG
10 SUPPOSITORY, RECTAL RECTAL
Status: DISCONTINUED | OUTPATIENT
Start: 2022-03-24 | End: 2022-03-24

## 2022-03-24 RX ORDER — MAGNESIUM HYDROXIDE 1200 MG/15ML
LIQUID ORAL CONTINUOUS PRN
Status: COMPLETED | OUTPATIENT
Start: 2022-03-24 | End: 2022-03-24

## 2022-03-24 RX ORDER — SODIUM PHOSPHATE, DIBASIC AND SODIUM PHOSPHATE, MONOBASIC 7; 19 G/133ML; G/133ML
1 ENEMA RECTAL ONCE AS NEEDED
Status: DISCONTINUED | OUTPATIENT
Start: 2022-03-24 | End: 2022-03-24

## 2022-03-24 RX ORDER — ASPIRIN 81 MG/1
81 TABLET ORAL DAILY
Status: DISCONTINUED | OUTPATIENT
Start: 2022-03-25 | End: 2022-03-28

## 2022-03-24 RX ADMIN — ROCURONIUM BROMIDE 50 MG: 10 INJECTION, SOLUTION INTRAVENOUS at 10:39:00

## 2022-03-24 RX ADMIN — FUROSEMIDE 20 MG: 10 INJECTION INTRAMUSCULAR; INTRAVENOUS at 13:22:00

## 2022-03-24 RX ADMIN — LIDOCAINE HYDROCHLORIDE 50 MG: 10 INJECTION, SOLUTION EPIDURAL; INFILTRATION; INTRACAUDAL; PERINEURAL at 09:00:00

## 2022-03-24 RX ADMIN — CALCIUM CHLORIDE 1 G: 100 INJECTION INTRAVENOUS; INTRAVENTRICULAR at 13:28:00

## 2022-03-24 RX ADMIN — ROCURONIUM BROMIDE 50 MG: 10 INJECTION, SOLUTION INTRAVENOUS at 09:45:00

## 2022-03-24 RX ADMIN — MIDAZOLAM HYDROCHLORIDE 2 MG: 1 INJECTION INTRAMUSCULAR; INTRAVENOUS at 08:51:00

## 2022-03-24 RX ADMIN — DEXMEDETOMIDINE HYDROCHLORIDE 0.6 MCG/KG/HR: 4 INJECTION, SOLUTION INTRAVENOUS at 11:58:00

## 2022-03-24 RX ADMIN — CEFAZOLIN SODIUM/WATER 2 G: 2 G/20 ML SYRINGE (ML) INTRAVENOUS at 09:15:00

## 2022-03-24 RX ADMIN — CALCIUM CHLORIDE 1 G: 100 INJECTION INTRAVENOUS; INTRAVENTRICULAR at 14:33:00

## 2022-03-24 RX ADMIN — ROCURONIUM BROMIDE 20 MG: 10 INJECTION, SOLUTION INTRAVENOUS at 11:59:00

## 2022-03-24 RX ADMIN — HEPARIN SODIUM 25000 UNITS: 1000 INJECTION, SOLUTION INTRAVENOUS; SUBCUTANEOUS at 10:31:00

## 2022-03-24 RX ADMIN — CEFAZOLIN SODIUM/WATER 2 G: 2 G/20 ML SYRINGE (ML) INTRAVENOUS at 14:44:00

## 2022-03-24 RX ADMIN — PROTAMINE SULFATE 250 MG: 10 INJECTION, SOLUTION INTRAVENOUS at 12:58:00

## 2022-03-24 RX ADMIN — PROTAMINE SULFATE 50 MG: 10 INJECTION, SOLUTION INTRAVENOUS at 13:47:00

## 2022-03-24 RX ADMIN — ROCURONIUM BROMIDE 50 MG: 10 INJECTION, SOLUTION INTRAVENOUS at 09:00:00

## 2022-03-24 NOTE — PROGRESS NOTES
[facility] note: Duplicate Proton Pump Inhibitor with Histamine 2 blocker. Josie Mcconnell is a 49year old patient who has been prescribed both famotidine (Pepcid)  And pantoprazole (Protonix). Pepcid was discontinued per [employer] approved protocol for duplicate therapy in adult patients for medications not ordered by gastroenterology.     Thank you,  Charlotte Quezada, PharmD  3/24/2022

## 2022-03-24 NOTE — ANESTHESIA PROCEDURE NOTES
Arterial Line  Performed by: Mayito Garrett MD  Authorized by: Mayito Garrett MD     General Information and Staff    Procedure Start:  3/24/2022 9:51 AM  Procedure End:  3/24/2022 9:51 AM  Anesthesiologist:  Mayito Garrett MD  Performed By:  Anesthesiologist  Patient Location:  OR  Indication: continuous blood pressure monitoring and blood sampling needed    Site Identification: surface landmarks    Preanesthetic Checklist: 2 patient identifiers, IV checked, risks and benefits discussed, monitors and equipment checked, pre-op evaluation, timeout performed, anesthesia consent and sterile technique used    Procedure Details    Catheter Size:  20 G  Catheter Length:  1 and 3/4 inchCatheter Type:  Arrow  Seldinger Technique?: Yes    Laterality:  LeftSite:  Radial artery  Site Prep: chlorhexidine  Line Secured:  Wrist Brace, tape and Tegaderm    Assessment    Events: patient tolerated procedure well with no complications      Medications      Additional Comments

## 2022-03-24 NOTE — ANESTHESIA PROCEDURE NOTES
Procedure Performed: MEGHANN      Start Time:  3/24/2022 1:00 PM       End Time:   3/24/2022 1:15 PM    Preanesthesia Checklist:  Patient identified, IV assessed, risks and benefits discussed, monitors and equipment assessed, procedure being performed at surgeon's request and anesthesia consent obtained. General Procedure Information  Diagnostic Indications for Echo:  hemodynamic monitoring  Physician Requesting Echo: Justin Hylton MD  Location performed:  OR  Intubated  Heart visualized  Probe Type:  Biplane  Modalities:  2D only    Echocardiographic and Doppler Measurements    Ventricles    Right Ventricle:  Cavity size normal.  Hypertrophy not present. Thrombus not present. Global function normal.    Left Ventricle:  Cavity size normal.  Hypertrophy not present. Thrombus not present. Global Function normal.  Ejection Fraction 65%. Ventricular Regional Function:  1- Basal Anteroseptal:  normal  2- Basal Anterior:  normal  3- Basal Anterolateral:  normal  4- Basal Inferolateral:  normal  5- Basal Inferior:  normal  6- Basal Inferoseptal:  normal  7- Mid Anteroseptal:  normal  8- Mid Anterior:  normal  9- Mid Anterolateral:  normal  10- Mid Inferolateral:  normal  11- Mid Inferior:  normal  12- Mid Inferoseptal:  normal  13- Apical Anterior:  normal  14- Apical Lateral:  normal  15- Apical Inferior:  normal  16- Apical Septal:  normal  17- [place]:  normal      Valves    Aortic Valve: Annulus normal.  Regurgitation +1. Leaflets normal.      Mitral Valve: Annulus normal.  Regurgitation +1. Leaflets normal.                                Anesthesia Information  Performed Personally  Anesthesiologist:  Abrahan Wu MD      Echocardiogram Comments:       Normal function. Very mild AI and MR.

## 2022-03-24 NOTE — ANESTHESIA PROCEDURE NOTES
Central Line  Performed by: Kaila Gamez MD  Authorized by: Kaila Gamez MD     General Information and Staff    Procedure Start:  3/24/2022 9:46 AM  Procedure End:  3/24/2022 9:46 AM  Anesthesiologist:  Kaila Gamez MD  Performed by:   Anesthesiologist  Patient Location:  OR  Indication: central venous access and CVP monitoring    Site Identification: real time ultrasound guided and image stored and retrievable    Preanesthetic Checklist: 2 patient identifiers, IV checked, risks and benefits discussed, monitors and equipment checked, pre-op evaluation, timeout performed, anesthesia consent and sterile technique used    Procedure Detail    Patient Position:  Trendelenburg  Laterality:  Right  Site:  Internal jugular  Prep:  Chloraprep  Catheter Size:  9 Fr  Catheter Type:  MAC introducer  Oximetric Catheter?: Yes    Procedure Detail: target vein identified, needle advanced into vein and blood aspirated and guidewire advanced into vein    Seldinger Technique?: Yes    Intravenous Verification: verified by ultrasound and venous blood return    Post Insertion: all ports aspirated, all ports flushed easily, guidewire was removed intact, line was sutured in place and dressing was applied      Assessment    Events: patient tolerated procedure well with no complications      PA Catheter Placement    PA Catheter Placed?: Yes    PA Catheter Type:  Oximetric  PA Catheter Size:  8  Laterality:  Right  Site:  Internal jugular  Placement Confirmation: pressure tracing changes and verified by MEGHANN    Events: patient tolerated procedure well with no complications      Additional Comments     Very collapsed vein

## 2022-03-24 NOTE — ANESTHESIA POSTPROCEDURE EVALUATION
Patient: Brice Aguirre    Procedure Summary     Date: 03/24/22 Room / Location: [facility] OR 18 / [facility] OR    Anesthesia Start: 2204 Anesthesia Stop: 6534    Procedure: CORONARY ARTERY BYPASS GRAFT x 4, UTILIZING LEFT INTERNAL MAMMARY ARTERY TO LAD, SVG TO RIGHT, SVG TO DIAG, SVG TO OM, LEFT LEG ENDOSCOPIC GREATER SAPHENOUS VEIN HARVEST, INSERTION TEMPORARY VENTRICULAR PACING WIRES, INTRAOPERARIVE TRANSESOPHEGEAL ECHOCARDIOGRAM. (N/A ) Diagnosis:       Coronary artery disease of native artery of native heart with stable angina pectoris (HCC)      (Coronary artery disease of native artery of native heart with stable angina pectoris ([facility] [address] [I25.118])    Surgeons: Ca Swann MD Anesthesiologist: John Vasquez MD    Anesthesia Type: general ASA Status: 4          Anesthesia Type: general    Vitals Value Taken Time   /65 03/24/22 1557   Temp 34.7 03/24/22 1557   Pulse 77 03/24/22 1557   Resp 12 on vent 03/24/22 1557   SpO2 100 03/24/22 1557       [facility] AN Post Evaluation:   Patient Participation: complete - patient cannot participate  Level of Consciousness: Post-procedure mental status: sedated.   Pain Management: adequate  Airway Patency:patent  Yes    Cardiovascular Status: acceptable  Respiratory Status: ETT  Postoperative Hydration acceptable  Comments: Sedated and intubated in the ICU      Wilfredo Benson MD  3/24/2022 3:57 PM

## 2022-03-24 NOTE — CM/SW NOTE
SW received MDO for [facility]. SW requested [facility] to send [facility] referrals. SW placed F2F. PLAN: pending medical clearance - home with home health  Need:  -available [facility] agency    SW remains available for support and/or discharge planning. Please do not hesitate to call/chat SW if further DC needs arise.      Shanique Church [facility]  Ext [phone]

## 2022-03-24 NOTE — ANESTHESIA PROCEDURE NOTES
Airway  Date/Time: 3/24/2022 9:04 AM  Urgency: Elective    Airway not difficult    General Information and Staff    Patient location during procedure: OR  Anesthesiologist: Spencer Perera MD  Performed: anesthesiologist     Indications and Patient Condition  Indications for airway management: anesthesia  Sedation level: deep  Preoxygenated: yes  Patient position: sniffing  Mask difficulty assessment: 1 - vent by mask    Final Airway Details  Final airway type: endotracheal airway      Successful airway: ETT  Cuffed: yes   Successful intubation technique: Video laryngoscopy  Facilitating devices/methods: intubating stylet  Endotracheal tube insertion site: oral  Blade: GlideScope  Blade size: #4  ETT size (mm): 8.0    Cormack-Lehane Classification: grade I - full view of glottis  Placement verified by: chest auscultation and capnometry   Measured from: teeth  Number of attempts at approach: 1

## 2022-03-24 NOTE — PLAN OF CARE
Received patient from OR for recovery s/p CABG x4. Patient hypoglycemic upon arrival to CCU. Insulin gtt stopped and 1 amp D50 given. Jaiden hugger applied. Chest tube and bellamy outputs monitored frequently. Urine output maintaining >30ml/hr. Labs drawn - K 3.3, replaced with 40mEq KCl, see MAR. On low dose levophed for BP. Albumin x1 administered.      Problem: Patient Centered Care  Goal: Patient preferences are identified and integrated in the patient's plan of care  Description: Interventions:  - Provide timely, complete, and accurate information to patient/family  - Incorporate patient and family knowledge, values, beliefs, and cultural backgrounds into the planning and delivery of care  - Encourage patient/family to participate in care and decision-making at the level they choose  - Honor patient and family perspectives and choices  Outcome: Progressing     Problem: Patient/Family Goals  Goal: Patient/Family Long Term Goal  Description: Patient's Long Term Goal: heart-healthy lifestyle    Interventions:  - heart healthy diet, activity  - See additional Care Plan goals for specific interventions  Outcome: Progressing  Goal: Patient/Family Short Term Goal  Description: Patient's Short Term Goal: no pain    Interventions:   - monitor for s/s of pain, administer pain medications as needed  - See additional Care Plan goals for specific interventions  Outcome: Progressing

## 2022-03-25 ENCOUNTER — APPOINTMENT (OUTPATIENT)
Dept: GENERAL RADIOLOGY | Facility: HOSPITAL | Age: 57
DRG: 236 | End: 2022-03-25
Attending: CLINICAL NURSE SPECIALIST
Payer: COMMERCIAL

## 2022-03-25 LAB
ANION GAP SERPL CALC-SCNC: 2 MMOL/L (ref 0–18)
BASE EXCESS BLD CALC-SCNC: 0.9 MMOL/L (ref ?–2)
BASOPHILS # BLD AUTO: 0 X10(3) UL (ref 0–0.2)
BASOPHILS NFR BLD AUTO: 0 %
BLOOD TYPE BARCODE: 6200
BLOOD TYPE BARCODE: 7300
BUN BLD-MCNC: 18 MG/DL (ref 7–18)
BUN/CREAT SERPL: 20.5 (ref 10–20)
CA-I BLD-SCNC: 1.24 MMOL/L (ref 0.95–1.32)
CALCIUM BLD-MCNC: 8.4 MG/DL (ref 8.5–10.1)
CHLORIDE SERPL-SCNC: 117 MMOL/L (ref 98–112)
CO2 SERPL-SCNC: 28 MMOL/L (ref 21–32)
COHGB MFR BLD: 1.8 % (ref 0–3)
CREAT BLD-MCNC: 0.88 MG/DL
DEPRECATED RDW RBC AUTO: 45.5 FL (ref 35.1–46.3)
EOSINOPHIL # BLD AUTO: 0.01 X10(3) UL (ref 0–0.7)
EOSINOPHIL NFR BLD AUTO: 0.1 %
ERYTHROCYTE [DISTWIDTH] IN BLOOD BY AUTOMATED COUNT: 13.7 % (ref 11–15)
GLUCOSE BLD-MCNC: 131 MG/DL (ref 70–99)
GLUCOSE BLDC GLUCOMTR-MCNC: 108 MG/DL (ref 70–99)
GLUCOSE BLDC GLUCOMTR-MCNC: 109 MG/DL (ref 70–99)
GLUCOSE BLDC GLUCOMTR-MCNC: 112 MG/DL (ref 70–99)
GLUCOSE BLDC GLUCOMTR-MCNC: 121 MG/DL (ref 70–99)
GLUCOSE BLDC GLUCOMTR-MCNC: 122 MG/DL (ref 70–99)
GLUCOSE BLDC GLUCOMTR-MCNC: 125 MG/DL (ref 70–99)
GLUCOSE BLDC GLUCOMTR-MCNC: 125 MG/DL (ref 70–99)
GLUCOSE BLDC GLUCOMTR-MCNC: 126 MG/DL (ref 70–99)
GLUCOSE BLDC GLUCOMTR-MCNC: 128 MG/DL (ref 70–99)
GLUCOSE BLDC GLUCOMTR-MCNC: 133 MG/DL (ref 70–99)
GLUCOSE BLDC GLUCOMTR-MCNC: 133 MG/DL (ref 70–99)
GLUCOSE BLDC GLUCOMTR-MCNC: 138 MG/DL (ref 70–99)
GLUCOSE BLDC GLUCOMTR-MCNC: 142 MG/DL (ref 70–99)
GLUCOSE BLDC GLUCOMTR-MCNC: 144 MG/DL (ref 70–99)
GLUCOSE BLDC GLUCOMTR-MCNC: 149 MG/DL (ref 70–99)
GLUCOSE BLDC GLUCOMTR-MCNC: 149 MG/DL (ref 70–99)
HCO3 BLDA-SCNC: 25.6 MEQ/L (ref 21–27)
HCT VFR BLD AUTO: 24.7 %
HGB BLD-MCNC: 8.2 G/DL
HGB BLD-MCNC: 8.2 G/DL
IMM GRANULOCYTES # BLD AUTO: 0.03 X10(3) UL (ref 0–1)
IMM GRANULOCYTES NFR BLD: 0.3 %
LACTATE BLD-SCNC: 0.8 MMOL/L (ref 0.5–2)
LYMPHOCYTES # BLD AUTO: 0.33 X10(3) UL (ref 1–4)
LYMPHOCYTES NFR BLD AUTO: 3.7 %
MAGNESIUM SERPL-MCNC: 2.1 MG/DL (ref 1.6–2.6)
MCH RBC QN AUTO: 30.3 PG (ref 26–34)
MCHC RBC AUTO-ENTMCNC: 33.2 G/DL (ref 31–37)
MCV RBC AUTO: 91.1 FL
METHGB MFR BLD: 1.3 % SAT (ref 0.4–1.5)
MONOCYTES # BLD AUTO: 0.63 X10(3) UL (ref 0.1–1)
MONOCYTES NFR BLD AUTO: 7 %
NEUTROPHILS # BLD AUTO: 7.97 X10(3) UL (ref 1.5–7.7)
NEUTROPHILS NFR BLD AUTO: 88.9 %
O2 CT BLD-SCNC: 11.4 VOL% (ref 15–23)
O2/TOTAL GAS SETTING VFR VENT: 30 %
OSMOLALITY SERPL CALC.SUM OF ELEC: 308 MOSM/KG (ref 275–295)
PCO2 BLDA: 38 MM HG (ref 35–45)
PEEP SETTING VENT: 5 CM H2O
PH BLDA: 7.43 [PH] (ref 7.35–7.45)
PLATELET # BLD AUTO: 188 10(3)UL (ref 150–450)
PO2 BLDA: 145 MM HG (ref 80–100)
POTASSIUM BLD-SCNC: 4 MMOL/L (ref 3.6–5.1)
POTASSIUM SERPL-SCNC: 4 MMOL/L (ref 3.5–5.1)
PRESSURE SUPPORT SETTING VENT: 10 CM H2O
PUNCTURE CHARGE: NO
RBC # BLD AUTO: 2.71 X10(6)UL
SAO2 % BLDA: >99 % (ref 94–100)
SODIUM BLD-SCNC: 143 MMOL/L (ref 135–145)
SODIUM SERPL-SCNC: 147 MMOL/L (ref 136–145)
WBC # BLD AUTO: 9 X10(3) UL (ref 4–11)

## 2022-03-25 PROCEDURE — 99233 SBSQ HOSP IP/OBS HIGH 50: CPT | Performed by: INTERNAL MEDICINE

## 2022-03-25 PROCEDURE — 71045 X-RAY EXAM CHEST 1 VIEW: CPT | Performed by: CLINICAL NURSE SPECIALIST

## 2022-03-25 RX ORDER — ALBUMIN, HUMAN INJ 5% 5 %
SOLUTION INTRAVENOUS
Status: COMPLETED
Start: 2022-03-25 | End: 2022-03-25

## 2022-03-25 RX ORDER — CLOPIDOGREL BISULFATE 75 MG/1
75 TABLET ORAL DAILY
COMMUNITY
End: 2022-03-28

## 2022-03-25 RX ORDER — ROSUVASTATIN CALCIUM 20 MG/1
40 TABLET, COATED ORAL NIGHTLY
Status: DISCONTINUED | OUTPATIENT
Start: 2022-03-25 | End: 2022-03-28

## 2022-03-25 RX ORDER — MELATONIN
325
Status: DISCONTINUED | OUTPATIENT
Start: 2022-03-25 | End: 2022-03-28

## 2022-03-25 RX ORDER — ATORVASTATIN CALCIUM 40 MG/1
40 TABLET, FILM COATED ORAL NIGHTLY
Status: DISCONTINUED | OUTPATIENT
Start: 2022-03-25 | End: 2022-03-25

## 2022-03-25 NOTE — OCCUPATIONAL THERAPY NOTE
Occupational Therapy order received, chart reviewed. Per PT, RN stated patient is on bedrest at this time. Will re-attempt as able.       Sonal Butler, [address] [place] [address]  #[phone]

## 2022-03-25 NOTE — HOME CARE LIAISON
Received referral via [facility] for Home Health services. Spoke w/ patient and provided with list of [facility] providers from [facility], patient choice is [address]. Agency reserved in [facility] and contact information placed on AVS.Financial interest disclosure provided to patient. Notified CM/Melba PHELPS

## 2022-03-25 NOTE — RESPIRATORY THERAPY NOTE
Pt received at 1545 from OR, intubated with ETT size 8.0 @ 23 cm at the lip, on VC/AC 12/600/+8/50%. ABG drawn, results as follow:     Ref. Range 3/24/2022 15:13   ABG PH Latest Ref Range: 7.35 - 7.45  7.50 (H)   ABG PCO2 Latest Ref Range: 35 - 45 mm Hg 31 (L)   ABG PO2 Latest Ref Range: 80 - 100 mm Hg 226 (H)   ABG HCO3 Latest Ref Range: 21.0 - 27.0 mEq/L 25.9     Per ABG Vt wean down to 500 ml, and FiO2 to 40%. @ 2115 FiO2 wean down to 30%. @ 2140 PEEP wean down to 5 cmH2O per MD order. Pt tolerating well, saturating appropriately, RT continue to monitor.

## 2022-03-25 NOTE — PHYSICAL THERAPY NOTE
PT orders received and chart reviewed. RN present in room, beginning to remove wires. Reports that patient will require period of bedrest. Will re-attempt as schedule permits.     Thank you,  Tamar Shannon  Student Physical Therapist  M[facility] Sor[facility]  92.  Ext: [phone]

## 2022-03-25 NOTE — CM/SW NOTE
Department  notified of request for [facility], griselda referrals started. Assigned CM/SW to follow up with pt/family on further discharge planning.      Garfield Larsen   March 25, 2022   09:10

## 2022-03-25 NOTE — PROGRESS NOTES
Pt extubated @ 0107. Pt is alert and follows command. OG removed as well. Pt within acceptable respiratory parameters.

## 2022-03-25 NOTE — RESPIRATORY THERAPY NOTE
Planned extubation to 3L NC post spontaneous breathing trial. Spontaneous parameters include: VC of 0.89 L, NIF of -21, and a 63 RSBI. Pt resting comfortably.

## 2022-03-25 NOTE — PLAN OF CARE
Pt safety maintained. Pt alert and oriented x 4. Follows command. CABG x 4. Chest tube intact. Output sanguinous fluid. Marked at 450. Bellamy intact patent. Urine output>30 ml /hr. Cordis intact. [place] intact. Marked at 49. Vianca on left wrist. Correlating with cuff pressure. Insulin drip running. Titrated per protocol. IV fluid running. Pt on levophed for pressure support. EKG done. Normal sinus rhythm. OOB to charin at 0600. Pain meds given. Will continue to monitor. Problem: Patient Centered Care  Goal: Patient preferences are identified and integrated in the patient's plan of care  Description: Interventions:  - What would you like us to know as we care for you? CABG x 4  - Provide timely, complete, and accurate information to patient/family  - Incorporate patient and family knowledge, values, beliefs, and cultural backgrounds into the planning and delivery of care  - Encourage patient/family to participate in care and decision-making at the level they choose  - Honor patient and family perspectives and choices  Outcome: Progressing     Problem: Patient/Family Goals  Goal: Patient/Family Long Term Goal  Description: Patient's Long Term Goal: to be discharged home    Interventions:  - s/p CABG x 4.  Pending removal of lines, chest tube and bellamy  - See additional Care Plan goals for specific interventions  Outcome: Progressing  Goal: Patient/Family Short Term Goal  Description: Patient's Short Term Goal: aparna Coley to be remove today    Interventions:   -waiting for MD to assess patient  - See additional Care Plan goals for specific interventions  Outcome: Progressing     Problem: CARDIOVASCULAR - ADULT  Goal: Maintains optimal cardiac output and hemodynamic stability  Description: INTERVENTIONS:  - Monitor vital signs, rhythm, and trends  - Monitor for bleeding, hypotension and signs of decreased cardiac output  - Evaluate effectiveness of vasoactive medications to optimize hemodynamic stability  - Monitor arterial and/or venous puncture sites for bleeding and/or hematoma  - Assess quality of pulses, skin color and temperature  - Assess for signs of decreased coronary artery perfusion - ex.  Angina  - Evaluate fluid balance, assess for edema, trend weights  Outcome: Progressing  Goal: Absence of cardiac arrhythmias or at baseline  Description: INTERVENTIONS:  - Continuous cardiac monitoring, monitor vital signs, obtain 12 lead EKG if indicated  - Evaluate effectiveness of antiarrhythmic and heart rate control medications as ordered  - Initiate emergency measures for life threatening arrhythmias  - Monitor electrolytes and administer replacement therapy as ordered  Outcome: Progressing     Problem: RESPIRATORY - ADULT  Goal: Achieves optimal ventilation and oxygenation  Description: INTERVENTIONS:  - Assess for changes in respiratory status  - Assess for changes in mentation and behavior  - Position to facilitate oxygenation and minimize respiratory effort  - Oxygen supplementation based on oxygen saturation or ABGs  - Provide Smoking Cessation handout, if applicable  - Encourage broncho-pulmonary hygiene including cough, deep breathe, Incentive Spirometry  - Assess the need for suctioning and perform as needed  - Assess and instruct to report SOB or any respiratory difficulty  - Respiratory Therapy support as indicated  - Manage/alleviate anxiety  - Monitor for signs/symptoms of CO2 retention  Outcome: Progressing     Problem: HEMATOLOGIC - ADULT  Goal: Maintains hematologic stability  Description: INTERVENTIONS  - Assess for signs and symptoms of bleeding or hemorrhage  - Monitor labs and vital signs for trends  - Administer supportive blood products/factors, fluids and medications as ordered and appropriate  - Administer supportive blood products/factors as ordered and appropriate  Outcome: Progressing  Goal: Free from bleeding injury  Description: (Example usage: patient with low platelets)  INTERVENTIONS:  - Avoid intramuscular injections, enemas and rectal medication administration  - Ensure safe mobilization of patient  - Hold pressure on venipuncture sites to achieve adequate hemostasis  - Assess for signs and symptoms of internal bleeding  - Monitor lab trends  - Patient is to report abnormal signs of bleeding to staff  - Avoid use of toothpicks and dental floss  - Use electric shaver for shaving  - Use soft bristle tooth brush  - Limit straining and forceful nose blowing  Outcome: Progressing     Problem: Diabetes/Glucose Control  Goal: Glucose maintained within prescribed range  Description: INTERVENTIONS:  - Monitor Blood Glucose as ordered  - Assess for signs and symptoms of hyperglycemia and hypoglycemia  - Administer ordered medications to maintain glucose within target range  - Assess barriers to adequate nutritional intake and initiate nutrition consult as needed  - Instruct patient on self management of diabetes  Outcome: Progressing

## 2022-03-25 NOTE — PLAN OF CARE
Patient weaned to room air. Levophed weaned off. Insulin gtt stopped. [place] and A-line removed. Patient ambulated in hallway - became tachycardic with ambulation, stabilized at rest. Norco and dilaudid provided PRN for pain relief. Encouraged IS use. Diet advanced. Patient reporting minimal appetite at this time.       Problem: Patient Centered Care  Goal: Patient preferences are identified and integrated in the patient's plan of care  Description: Interventions:  - Provide timely, complete, and accurate information to patient/family  - Incorporate patient and family knowledge, values, beliefs, and cultural backgrounds into the planning and delivery of care  - Encourage patient/family to participate in care and decision-making at the level they choose  - Honor patient and family perspectives and choices  Outcome: Progressing     Problem: Patient/Family Goals  Goal: Patient/Family Long Term Goal  Description: Patient's Long Term Goal: heart healthy lifestyle    Interventions:  - routine post-op care, heart healthy diet, exercise, monitor HTN, continue follow-up appointments  - See additional Care Plan goals for specific interventions  Outcome: Progressing  Goal: Patient/Family Short Term Goal  Description: Patient's Short Term Goal: increase activity    Interventions:   - increase activity as tolerated, pain control, alternate periods of rest/activity   - See additional Care Plan goals for specific interventions  Outcome: Progressing     Problem: CARDIOVASCULAR - ADULT  Goal: Maintains optimal cardiac output and hemodynamic stability  Description: INTERVENTIONS:  - Monitor vital signs, rhythm, and trends  - Monitor for bleeding, hypotension and signs of decreased cardiac output  - Evaluate effectiveness of vasoactive medications to optimize hemodynamic stability  - Monitor arterial and/or venous puncture sites for bleeding and/or hematoma  - Assess quality of pulses, skin color and temperature  - Assess for signs of decreased coronary artery perfusion - ex.  Angina  - Evaluate fluid balance, assess for edema, trend weights  Outcome: Progressing  Goal: Absence of cardiac arrhythmias or at baseline  Description: INTERVENTIONS:  - Continuous cardiac monitoring, monitor vital signs, obtain 12 lead EKG if indicated  - Evaluate effectiveness of antiarrhythmic and heart rate control medications as ordered  - Initiate emergency measures for life threatening arrhythmias  - Monitor electrolytes and administer replacement therapy as ordered  Outcome: Progressing     Problem: RESPIRATORY - ADULT  Goal: Achieves optimal ventilation and oxygenation  Description: INTERVENTIONS:  - Assess for changes in respiratory status  - Assess for changes in mentation and behavior  - Position to facilitate oxygenation and minimize respiratory effort  - Oxygen supplementation based on oxygen saturation or ABGs  - Provide Smoking Cessation handout, if applicable  - Encourage broncho-pulmonary hygiene including cough, deep breathe, Incentive Spirometry  - Assess the need for suctioning and perform as needed  - Assess and instruct to report SOB or any respiratory difficulty  - Respiratory Therapy support as indicated  - Manage/alleviate anxiety  - Monitor for signs/symptoms of CO2 retention  Outcome: Progressing     Problem: HEMATOLOGIC - ADULT  Goal: Maintains hematologic stability  Description: INTERVENTIONS  - Assess for signs and symptoms of bleeding or hemorrhage  - Monitor labs and vital signs for trends  - Administer supportive blood products/factors, fluids and medications as ordered and appropriate  - Administer supportive blood products/factors as ordered and appropriate  Outcome: Progressing  Goal: Free from bleeding injury  Description: (Example usage: patient with low platelets)  INTERVENTIONS:  - Avoid intramuscular injections, enemas and rectal medication administration  - Ensure safe mobilization of patient  - Hold pressure on venipuncture sites to achieve adequate hemostasis  - Assess for signs and symptoms of internal bleeding  - Monitor lab trends  - Patient is to report abnormal signs of bleeding to staff  - Avoid use of toothpicks and dental floss  - Use electric shaver for shaving  - Use soft bristle tooth brush  - Limit straining and forceful nose blowing  Outcome: Progressing     Problem: Diabetes/Glucose Control  Goal: Glucose maintained within prescribed range  Description: INTERVENTIONS:  - Monitor Blood Glucose as ordered  - Assess for signs and symptoms of hyperglycemia and hypoglycemia  - Administer ordered medications to maintain glucose within target range  - Assess barriers to adequate nutritional intake and initiate nutrition consult as needed  - Instruct patient on self management of diabetes  Outcome: Progressing

## 2022-03-26 LAB
GLUCOSE BLDC GLUCOMTR-MCNC: 129 MG/DL (ref 70–99)
GLUCOSE BLDC GLUCOMTR-MCNC: 131 MG/DL (ref 70–99)
GLUCOSE BLDC GLUCOMTR-MCNC: 162 MG/DL (ref 70–99)
GLUCOSE BLDC GLUCOMTR-MCNC: 163 MG/DL (ref 70–99)

## 2022-03-26 PROCEDURE — 99233 SBSQ HOSP IP/OBS HIGH 50: CPT | Performed by: INTERNAL MEDICINE

## 2022-03-26 PROCEDURE — 99233 SBSQ HOSP IP/OBS HIGH 50: CPT | Performed by: HOSPITALIST

## 2022-03-26 RX ORDER — FUROSEMIDE 10 MG/ML
20 INJECTION INTRAMUSCULAR; INTRAVENOUS ONCE
Status: COMPLETED | OUTPATIENT
Start: 2022-03-26 | End: 2022-03-26

## 2022-03-26 NOTE — PLAN OF CARE
Pt safety maintained. Alert and oriented x 4. Post op day 2. Chest tube intact. No sign of any bleed. Self intact. Adequate urine output. Pt on 2L nasal canula. 750 on IS. IVL intact. Pain meds given. Verbalize relief. Problem: Patient Centered Care  Goal: Patient preferences are identified and integrated in the patient's plan of care  Description: Interventions:  - What would you like us to know as we care for you?   - Provide timely, complete, and accurate information to patient/family  - Incorporate patient and family knowledge, values, beliefs, and cultural backgrounds into the planning and delivery of care  - Encourage patient/family to participate in care and decision-making at the level they choose  - Honor patient and family perspectives and choices  Outcome: Progressing     Problem: Patient/Family Goals  Goal: Patient/Family Long Term Goal  Description: Patient's Long Term Goal: to be discharged    Interventions:  - possible discharge on monday  - See additional Care Plan goals for specific interventions  Outcome: Progressing  Goal: Patient/Family Short Term Goal  Description: Patient's Short Term Goal: chest tube removal    Interventions:   - chest tube to be removed today. - See additional Care Plan goals for specific interventions  Outcome: Progressing     Problem: CARDIOVASCULAR - ADULT  Goal: Maintains optimal cardiac output and hemodynamic stability  Description: INTERVENTIONS:  - Monitor vital signs, rhythm, and trends  - Monitor for bleeding, hypotension and signs of decreased cardiac output  - Evaluate effectiveness of vasoactive medications to optimize hemodynamic stability  - Monitor arterial and/or venous puncture sites for bleeding and/or hematoma  - Assess quality of pulses, skin color and temperature  - Assess for signs of decreased coronary artery perfusion - ex.  Angina  - Evaluate fluid balance, assess for edema, trend weights  Outcome: Progressing  Goal: Absence of cardiac arrhythmias or at baseline  Description: INTERVENTIONS:  - Continuous cardiac monitoring, monitor vital signs, obtain 12 lead EKG if indicated  - Evaluate effectiveness of antiarrhythmic and heart rate control medications as ordered  - Initiate emergency measures for life threatening arrhythmias  - Monitor electrolytes and administer replacement therapy as ordered  Outcome: Progressing     Problem: RESPIRATORY - ADULT  Goal: Achieves optimal ventilation and oxygenation  Description: INTERVENTIONS:  - Assess for changes in respiratory status  - Assess for changes in mentation and behavior  - Position to facilitate oxygenation and minimize respiratory effort  - Oxygen supplementation based on oxygen saturation or ABGs  - Provide Smoking Cessation handout, if applicable  - Encourage broncho-pulmonary hygiene including cough, deep breathe, Incentive Spirometry  - Assess the need for suctioning and perform as needed  - Assess and instruct to report SOB or any respiratory difficulty  - Respiratory Therapy support as indicated  - Manage/alleviate anxiety  - Monitor for signs/symptoms of CO2 retention  Outcome: Progressing     Problem: HEMATOLOGIC - ADULT  Goal: Maintains hematologic stability  Description: INTERVENTIONS  - Assess for signs and symptoms of bleeding or hemorrhage  - Monitor labs and vital signs for trends  - Administer supportive blood products/factors, fluids and medications as ordered and appropriate  - Administer supportive blood products/factors as ordered and appropriate  Outcome: Progressing  Goal: Free from bleeding injury  Description: (Example usage: patient with low platelets)  INTERVENTIONS:  - Avoid intramuscular injections, enemas and rectal medication administration  - Ensure safe mobilization of patient  - Hold pressure on venipuncture sites to achieve adequate hemostasis  - Assess for signs and symptoms of internal bleeding  - Monitor lab trends  - Patient is to report abnormal signs of bleeding to staff  - Avoid use of toothpicks and dental floss  - Use electric shaver for shaving  - Use soft bristle tooth brush  - Limit straining and forceful nose blowing  Outcome: Progressing

## 2022-03-26 NOTE — PLAN OF CARE
Chest tubes, bellamy catheter, and cordis removed, pt tolerating well. Vitals stable throughout shift - pt tachycardic & tachypnic w/ pain in evening, PRN pain medication administered and 2 L NC applied. Pt up to chair throughout most of shift and ambulating in room/singh. Urine output adequate. Minimal appetite during shift. Safety measures implemented w/ call light within reach. [phone] - Report given to Ti Corbin RN, all questions answered. Problem: Patient Centered Care  Goal: Patient preferences are identified and integrated in the patient's plan of care  Description: Interventions:  - What would you like us to know as we care for you?   - Provide timely, complete, and accurate information to patient/family  - Incorporate patient and family knowledge, values, beliefs, and cultural backgrounds into the planning and delivery of care  - Encourage patient/family to participate in care and decision-making at the level they choose  - Honor patient and family perspectives and choices  Outcome: Progressing     Problem: CARDIOVASCULAR - ADULT  Goal: Maintains optimal cardiac output and hemodynamic stability  Description: INTERVENTIONS:  - Monitor vital signs, rhythm, and trends  - Monitor for bleeding, hypotension and signs of decreased cardiac output  - Evaluate effectiveness of vasoactive medications to optimize hemodynamic stability  - Monitor arterial and/or venous puncture sites for bleeding and/or hematoma  - Assess quality of pulses, skin color and temperature  - Assess for signs of decreased coronary artery perfusion - ex.  Angina  - Evaluate fluid balance, assess for edema, trend weights  Outcome: Progressing  Goal: Absence of cardiac arrhythmias or at baseline  Description: INTERVENTIONS:  - Continuous cardiac monitoring, monitor vital signs, obtain 12 lead EKG if indicated  - Evaluate effectiveness of antiarrhythmic and heart rate control medications as ordered  - Initiate emergency measures for life threatening arrhythmias  - Monitor electrolytes and administer replacement therapy as ordered  Outcome: Progressing     Problem: RESPIRATORY - ADULT  Goal: Achieves optimal ventilation and oxygenation  Description: INTERVENTIONS:  - Assess for changes in respiratory status  - Assess for changes in mentation and behavior  - Position to facilitate oxygenation and minimize respiratory effort  - Oxygen supplementation based on oxygen saturation or ABGs  - Provide Smoking Cessation handout, if applicable  - Encourage broncho-pulmonary hygiene including cough, deep breathe, Incentive Spirometry  - Assess the need for suctioning and perform as needed  - Assess and instruct to report SOB or any respiratory difficulty  - Respiratory Therapy support as indicated  - Manage/alleviate anxiety  - Monitor for signs/symptoms of CO2 retention  Outcome: Progressing     Problem: HEMATOLOGIC - ADULT  Goal: Maintains hematologic stability  Description: INTERVENTIONS  - Assess for signs and symptoms of bleeding or hemorrhage  - Monitor labs and vital signs for trends  - Administer supportive blood products/factors, fluids and medications as ordered and appropriate  - Administer supportive blood products/factors as ordered and appropriate  Outcome: Progressing  Goal: Free from bleeding injury  Description: (Example usage: patient with low platelets)  INTERVENTIONS:  - Avoid intramuscular injections, enemas and rectal medication administration  - Ensure safe mobilization of patient  - Hold pressure on venipuncture sites to achieve adequate hemostasis  - Assess for signs and symptoms of internal bleeding  - Monitor lab trends  - Patient is to report abnormal signs of bleeding to staff  - Avoid use of toothpicks and dental floss  - Use electric shaver for shaving  - Use soft bristle tooth brush  - Limit straining and forceful nose blowing  Outcome: Progressing     Problem: Diabetes/Glucose Control  Goal: Glucose maintained within prescribed range  Description: INTERVENTIONS:  - Monitor Blood Glucose as ordered  - Assess for signs and symptoms of hyperglycemia and hypoglycemia  - Administer ordered medications to maintain glucose within target range  - Assess barriers to adequate nutritional intake and initiate nutrition consult as needed  - Instruct patient on self management of diabetes  Outcome: Progressing

## 2022-03-27 ENCOUNTER — APPOINTMENT (OUTPATIENT)
Dept: GENERAL RADIOLOGY | Facility: HOSPITAL | Age: 57
DRG: 236 | End: 2022-03-27
Attending: CLINICAL NURSE SPECIALIST
Payer: COMMERCIAL

## 2022-03-27 LAB
BLOOD TYPE BARCODE: 5100
GLUCOSE BLDC GLUCOMTR-MCNC: 123 MG/DL (ref 70–99)
GLUCOSE BLDC GLUCOMTR-MCNC: 142 MG/DL (ref 70–99)
GLUCOSE BLDC GLUCOMTR-MCNC: 158 MG/DL (ref 70–99)
GLUCOSE BLDC GLUCOMTR-MCNC: 158 MG/DL (ref 70–99)

## 2022-03-27 PROCEDURE — 99233 SBSQ HOSP IP/OBS HIGH 50: CPT | Performed by: HOSPITALIST

## 2022-03-27 PROCEDURE — 71046 X-RAY EXAM CHEST 2 VIEWS: CPT | Performed by: CLINICAL NURSE SPECIALIST

## 2022-03-27 PROCEDURE — 99232 SBSQ HOSP IP/OBS MODERATE 35: CPT | Performed by: INTERNAL MEDICINE

## 2022-03-27 RX ORDER — FUROSEMIDE 10 MG/ML
20 INJECTION INTRAMUSCULAR; INTRAVENOUS ONCE
Status: COMPLETED | OUTPATIENT
Start: 2022-03-27 | End: 2022-03-27

## 2022-03-27 NOTE — PROGRESS NOTES
While patient showering with PCT, this RN observed L foot with nonpitting edema and purple discoloration to tip of second toe. Pulse palpable +1 confirmed with doppler. Equal in temperature to RLE. Encouraged patient to elevate legs and do frequent ankle pumps. Discoloration slightly improved after ~20 minutes.

## 2022-03-27 NOTE — PLAN OF CARE
Patient is POD#3. Pacer  wires capped. Complaining back pain last night. PRN meds given On O2 at 2 liters per nasal cannula. Encouraged use of incentive spirometer. Voiding er urinal.    Problem: Patient Centered Care  Goal: Patient preferences are identified and integrated in the patient's plan of care  Description: Interventions:  - What would you like us to know as we care for you?  - Provide timely, complete, and accurate information to patient/family  - Incorporate patient and family knowledge, values, beliefs, and cultural backgrounds into the planning and delivery of care  - Encourage patient/family to participate in care and decision-making at the level they choose  - Honor patient and family perspectives and choices  Outcome: Progressing     Problem: Patient/Family Goals  Goal: Patient/Family Long Term Goal  Description: Patient's Long Term Goal: go home    Interventions:  -   - See additional Care Plan goals for specific interventions  Outcome: Progressing  Goal: Patient/Family Short Term Goal  Description: Patient's Short Term Goal: get strong and be pain free    Interventions:   - ambulate 4x day  -up in the chair  -weigh daily  -PRN pain medication  - See additional Care Plan goals for specific interventions  Outcome: Progressing     Problem: CARDIOVASCULAR - ADULT  Goal: Maintains optimal cardiac output and hemodynamic stability  Description: INTERVENTIONS:  - Monitor vital signs, rhythm, and trends  - Monitor for bleeding, hypotension and signs of decreased cardiac output  - Evaluate effectiveness of vasoactive medications to optimize hemodynamic stability  - Monitor arterial and/or venous puncture sites for bleeding and/or hematoma  - Assess quality of pulses, skin color and temperature  - Assess for signs of decreased coronary artery perfusion - ex.  Angina  - Evaluate fluid balance, assess for edema, trend weights  Outcome: Progressing  Goal: Absence of cardiac arrhythmias or at baseline  Description: INTERVENTIONS:  - Continuous cardiac monitoring, monitor vital signs, obtain 12 lead EKG if indicated  - Evaluate effectiveness of antiarrhythmic and heart rate control medications as ordered  - Initiate emergency measures for life threatening arrhythmias  - Monitor electrolytes and administer replacement therapy as ordered  Outcome: Progressing     Problem: RESPIRATORY - ADULT  Goal: Achieves optimal ventilation and oxygenation  Description: INTERVENTIONS:  - Assess for changes in respiratory status  - Assess for changes in mentation and behavior  - Position to facilitate oxygenation and minimize respiratory effort  - Oxygen supplementation based on oxygen saturation or ABGs  - Provide Smoking Cessation handout, if applicable  - Encourage broncho-pulmonary hygiene including cough, deep breathe, Incentive Spirometry  - Assess the need for suctioning and perform as needed  - Assess and instruct to report SOB or any respiratory difficulty  - Respiratory Therapy support as indicated  - Manage/alleviate anxiety  - Monitor for signs/symptoms of CO2 retention  Outcome: Progressing     Problem: HEMATOLOGIC - ADULT  Goal: Maintains hematologic stability  Description: INTERVENTIONS  - Assess for signs and symptoms of bleeding or hemorrhage  - Monitor labs and vital signs for trends  - Administer supportive blood products/factors, fluids and medications as ordered and appropriate  - Administer supportive blood products/factors as ordered and appropriate  Outcome: Progressing  Goal: Free from bleeding injury  Description: (Example usage: patient with low platelets)  INTERVENTIONS:  - Avoid intramuscular injections, enemas and rectal medication administration  - Ensure safe mobilization of patient  - Hold pressure on venipuncture sites to achieve adequate hemostasis  - Assess for signs and symptoms of internal bleeding  - Monitor lab trends  - Patient is to report abnormal signs of bleeding to staff  - Avoid use of toothpicks and dental floss  - Use electric shaver for shaving  - Use soft bristle tooth brush  - Limit straining and forceful nose blowing  Outcome: Progressing     Problem: Diabetes/Glucose Control  Goal: Glucose maintained within prescribed range  Description: INTERVENTIONS:  - Monitor Blood Glucose as ordered  - Assess for signs and symptoms of hyperglycemia and hypoglycemia  - Administer ordered medications to maintain glucose within target range  - Assess barriers to adequate nutritional intake and initiate nutrition consult as needed  - Instruct patient on self management of diabetes  Outcome: Progressing

## 2022-03-28 VITALS
RESPIRATION RATE: 23 BRPM | BODY MASS INDEX: 19.69 KG/M2 | SYSTOLIC BLOOD PRESSURE: 113 MMHG | HEIGHT: 70 IN | WEIGHT: 137.56 LBS | OXYGEN SATURATION: 97 % | TEMPERATURE: 99 F | DIASTOLIC BLOOD PRESSURE: 77 MMHG | HEART RATE: 99 BPM

## 2022-03-28 LAB
ANION GAP SERPL CALC-SCNC: 4 MMOL/L (ref 0–18)
BASOPHILS # BLD AUTO: 0.02 X10(3) UL (ref 0–0.2)
BASOPHILS NFR BLD AUTO: 0.3 %
BUN BLD-MCNC: 23 MG/DL (ref 7–18)
BUN/CREAT SERPL: 32.9 (ref 10–20)
CALCIUM BLD-MCNC: 8 MG/DL (ref 8.5–10.1)
CHLORIDE SERPL-SCNC: 107 MMOL/L (ref 98–112)
CO2 SERPL-SCNC: 31 MMOL/L (ref 21–32)
CREAT BLD-MCNC: 0.7 MG/DL
DEPRECATED RDW RBC AUTO: 48.5 FL (ref 35.1–46.3)
EOSINOPHIL # BLD AUTO: 0.1 X10(3) UL (ref 0–0.7)
EOSINOPHIL NFR BLD AUTO: 1.6 %
ERYTHROCYTE [DISTWIDTH] IN BLOOD BY AUTOMATED COUNT: 14 % (ref 11–15)
ERYTHROCYTE [SEDIMENTATION RATE] IN BLOOD: 86 MM/HR
GLUCOSE BLD-MCNC: 138 MG/DL (ref 70–99)
GLUCOSE BLDC GLUCOMTR-MCNC: 118 MG/DL (ref 70–99)
GLUCOSE BLDC GLUCOMTR-MCNC: 186 MG/DL (ref 70–99)
HCT VFR BLD AUTO: 22.7 %
HGB BLD-MCNC: 7.3 G/DL
IMM GRANULOCYTES # BLD AUTO: 0.03 X10(3) UL (ref 0–1)
IMM GRANULOCYTES NFR BLD: 0.5 %
LYMPHOCYTES # BLD AUTO: 0.58 X10(3) UL (ref 1–4)
LYMPHOCYTES NFR BLD AUTO: 9.4 %
MAGNESIUM SERPL-MCNC: 2 MG/DL (ref 1.6–2.6)
MCH RBC QN AUTO: 30.4 PG (ref 26–34)
MCHC RBC AUTO-ENTMCNC: 32.2 G/DL (ref 31–37)
MCV RBC AUTO: 94.6 FL
MONOCYTES # BLD AUTO: 0.49 X10(3) UL (ref 0.1–1)
MONOCYTES NFR BLD AUTO: 8 %
NEUTROPHILS # BLD AUTO: 4.92 X10 (3) UL (ref 1.5–7.7)
NEUTROPHILS # BLD AUTO: 4.92 X10(3) UL (ref 1.5–7.7)
NEUTROPHILS NFR BLD AUTO: 80.2 %
OSMOLALITY SERPL CALC.SUM OF ELEC: 300 MOSM/KG (ref 275–295)
PLATELET # BLD AUTO: 164 10(3)UL (ref 150–450)
POTASSIUM SERPL-SCNC: 3.4 MMOL/L (ref 3.5–5.1)
SODIUM SERPL-SCNC: 142 MMOL/L (ref 136–145)
WBC # BLD AUTO: 6.1 X10(3) UL (ref 4–11)

## 2022-03-28 PROCEDURE — 99238 HOSP IP/OBS DSCHRG MGMT 30/<: CPT | Performed by: HOSPITALIST

## 2022-03-28 RX ORDER — COLCHICINE 0.6 MG/1
0.6 TABLET ORAL 2 TIMES DAILY
Status: DISCONTINUED | OUTPATIENT
Start: 2022-03-28 | End: 2022-03-28

## 2022-03-28 RX ORDER — MELATONIN
325
Qty: 90 TABLET | Refills: 0 | Status: SHIPPED | OUTPATIENT
Start: 2022-03-28

## 2022-03-28 RX ORDER — COLCHICINE 0.6 MG/1
0.6 TABLET ORAL 2 TIMES DAILY
Qty: 60 TABLET | Refills: 0 | Status: SHIPPED | OUTPATIENT
Start: 2022-03-28

## 2022-03-28 RX ORDER — PSEUDOEPHEDRINE HCL 30 MG
100 TABLET ORAL 2 TIMES DAILY
Qty: 30 CAPSULE | Refills: 0 | Status: SHIPPED | OUTPATIENT
Start: 2022-03-28

## 2022-03-28 RX ORDER — ASCORBIC ACID 500 MG
500 TABLET ORAL 3 TIMES DAILY
Qty: 90 TABLET | Refills: 0 | Status: SHIPPED | OUTPATIENT
Start: 2022-03-28

## 2022-03-28 RX ORDER — ROSUVASTATIN CALCIUM 40 MG/1
40 TABLET, COATED ORAL NIGHTLY
Qty: 30 TABLET | Refills: 3 | Status: SHIPPED | OUTPATIENT
Start: 2022-03-28

## 2022-03-28 RX ORDER — HYDROCODONE BITARTRATE AND ACETAMINOPHEN 5; 325 MG/1; MG/1
2 TABLET ORAL EVERY 4 HOURS PRN
Qty: 30 TABLET | Refills: 0 | Status: SHIPPED | OUTPATIENT
Start: 2022-03-28

## 2022-03-28 RX ORDER — POTASSIUM CHLORIDE 20 MEQ/1
40 TABLET, EXTENDED RELEASE ORAL EVERY 4 HOURS
Status: COMPLETED | OUTPATIENT
Start: 2022-03-28 | End: 2022-03-28

## 2022-03-28 RX ORDER — PANTOPRAZOLE SODIUM 40 MG/1
40 TABLET, DELAYED RELEASE ORAL
Status: DISCONTINUED | OUTPATIENT
Start: 2022-03-29 | End: 2022-03-28

## 2022-03-28 NOTE — PLAN OF CARE
Problem: Patient Centered Care  Goal: Patient preferences are identified and integrated in the patient's plan of care  Description: Interventions:  - What would you like us to know as we care for you? Keep patient informed of changes in plan of care  - Provide timely, complete, and accurate information to patient/family  - Incorporate patient and family knowledge, values, beliefs, and cultural backgrounds into the planning and delivery of care  - Encourage patient/family to participate in care and decision-making at the level they choose  - Honor patient and family perspectives and choices  Outcome: Progressing     Problem: Patient/Family Goals  Goal: Patient/Family Long Term Goal  Description: Patient's Long Term Goal: Discharge home    Interventions:  - Medications as ordered  - Tests and procedures as ordered  - PT/OT as ordered  - See additional Care Plan goals for specific interventions  Outcome: Progressing  Goal: Patient/Family Short Term Goal  Description: Patient's Short Term Goal: Increased strength and stamina    Interventions:   - Medications as ordered  - Tests and procedures as ordered  - PT/OT as ordered  - See additional Care Plan goals for specific interventions  Outcome: Progressing     Problem: CARDIOVASCULAR - ADULT  Goal: Maintains optimal cardiac output and hemodynamic stability  Description: INTERVENTIONS:  - Monitor vital signs, rhythm, and trends  - Monitor for bleeding, hypotension and signs of decreased cardiac output  - Evaluate effectiveness of vasoactive medications to optimize hemodynamic stability  - Monitor arterial and/or venous puncture sites for bleeding and/or hematoma  - Assess quality of pulses, skin color and temperature  - Assess for signs of decreased coronary artery perfusion - ex.  Angina  - Evaluate fluid balance, assess for edema, trend weights  Outcome: Progressing  Goal: Absence of cardiac arrhythmias or at baseline  Description: INTERVENTIONS:  - Continuous cardiac monitoring, monitor vital signs, obtain 12 lead EKG if indicated  - Evaluate effectiveness of antiarrhythmic and heart rate control medications as ordered  - Initiate emergency measures for life threatening arrhythmias  - Monitor electrolytes and administer replacement therapy as ordered  Outcome: Progressing     Problem: RESPIRATORY - ADULT  Goal: Achieves optimal ventilation and oxygenation  Description: INTERVENTIONS:  - Assess for changes in respiratory status  - Assess for changes in mentation and behavior  - Position to facilitate oxygenation and minimize respiratory effort  - Oxygen supplementation based on oxygen saturation or ABGs  - Provide Smoking Cessation handout, if applicable  - Encourage broncho-pulmonary hygiene including cough, deep breathe, Incentive Spirometry  - Assess the need for suctioning and perform as needed  - Assess and instruct to report SOB or any respiratory difficulty  - Respiratory Therapy support as indicated  - Manage/alleviate anxiety  - Monitor for signs/symptoms of CO2 retention  Outcome: Progressing     Problem: HEMATOLOGIC - ADULT  Goal: Maintains hematologic stability  Description: INTERVENTIONS  - Assess for signs and symptoms of bleeding or hemorrhage  - Monitor labs and vital signs for trends  - Administer supportive blood products/factors, fluids and medications as ordered and appropriate  - Administer supportive blood products/factors as ordered and appropriate  Outcome: Progressing  Goal: Free from bleeding injury  Description: (Example usage: patient with low platelets)  INTERVENTIONS:  - Avoid intramuscular injections, enemas and rectal medication administration  - Ensure safe mobilization of patient  - Hold pressure on venipuncture sites to achieve adequate hemostasis  - Assess for signs and symptoms of internal bleeding  - Monitor lab trends  - Patient is to report abnormal signs of bleeding to staff  - Avoid use of toothpicks and dental floss  - Use electric shaver for shaving  - Use soft bristle tooth brush  - Limit straining and forceful nose blowing  Outcome: Progressing     Problem: Diabetes/Glucose Control  Goal: Glucose maintained within prescribed range  Description: INTERVENTIONS:  - Monitor Blood Glucose as ordered  - Assess for signs and symptoms of hyperglycemia and hypoglycemia  - Administer ordered medications to maintain glucose within target range  - Assess barriers to adequate nutritional intake and initiate nutrition consult as needed  - Instruct patient on self management of diabetes  Outcome: Progressing

## 2022-03-28 NOTE — CM/SW NOTE
03/28/22 0900   Discharge disposition   Expected discharge disposition Home-Health   Post Acute Care Provider Residential   Discharge transportation Private car     Notified in rounds that patient is ready for discharge today. Noted that PT recommending HH PT,  New F2F entered with RN/PT  Met with patient at the bedside to confirm plan. Notified [facility] liaisonTara of discharge today. Plan:  Home with [facility],       / to remain available for support and/or discharge planning.      Niecy Leslie MBA MSN, RN CTL/  [id number]

## 2022-03-28 NOTE — PROGRESS NOTES
Patient discharged home with spouse. Escorted out with Rn via Wheelchair. All lines removed. Patient stable. Discharge Summary reviewed over with patient and family. A copy of discharge summary given to patient. All questions answered.    All prescriptions e-prescribed  Calendar appointments given to patient

## 2022-03-28 NOTE — DIETARY NOTE
NUTRITION EDUCATION NOTE    Received consult for nutrition education per cardiac rehab order set. Appropriate education and handout(s) provided. See education section of Epic for specifics.     Tara Buck RDN, LDN  Clinical Nutrition  Ext [phone]

## 2022-03-28 NOTE — PLAN OF CARE
Patient Aox4. S/p CABGx4. Vitals stable. NSR. Denies CP, SOB. Ambulating in hallway. Cleared to go home today         Problem: Patient Centered Care  Goal: Patient preferences are identified and integrated in the patient's plan of care  Description: Interventions:  - What would you like us to know as we care for you?   - Provide timely, complete, and accurate information to patient/family  - Incorporate patient and family knowledge, values, beliefs, and cultural backgrounds into the planning and delivery of care  - Encourage patient/family to participate in care and decision-making at the level they choose  - Honor patient and family perspectives and choices  Outcome: Adequate for Discharge     Problem: Patient/Family Goals  Goal: Patient/Family Long Term Goal  Description: Patient's Long Term Goal:     Interventions:  -   - See additional Care Plan goals for specific interventions  Outcome: Adequate for Discharge  Goal: Patient/Family Short Term Goal  Description: Patient's Short Term Goal:     Interventions:   -   - See additional Care Plan goals for specific interventions  Outcome: Adequate for Discharge     Problem: CARDIOVASCULAR - ADULT  Goal: Maintains optimal cardiac output and hemodynamic stability  Description: INTERVENTIONS:  - Monitor vital signs, rhythm, and trends  - Monitor for bleeding, hypotension and signs of decreased cardiac output  - Evaluate effectiveness of vasoactive medications to optimize hemodynamic stability  - Monitor arterial and/or venous puncture sites for bleeding and/or hematoma  - Assess quality of pulses, skin color and temperature  - Assess for signs of decreased coronary artery perfusion - ex.  Angina  - Evaluate fluid balance, assess for edema, trend weights  Outcome: Adequate for Discharge  Goal: Absence of cardiac arrhythmias or at baseline  Description: INTERVENTIONS:  - Continuous cardiac monitoring, monitor vital signs, obtain 12 lead EKG if indicated  - Evaluate effectiveness of antiarrhythmic and heart rate control medications as ordered  - Initiate emergency measures for life threatening arrhythmias  - Monitor electrolytes and administer replacement therapy as ordered  Outcome: Adequate for Discharge     Problem: RESPIRATORY - ADULT  Goal: Achieves optimal ventilation and oxygenation  Description: INTERVENTIONS:  - Assess for changes in respiratory status  - Assess for changes in mentation and behavior  - Position to facilitate oxygenation and minimize respiratory effort  - Oxygen supplementation based on oxygen saturation or ABGs  - Provide Smoking Cessation handout, if applicable  - Encourage broncho-pulmonary hygiene including cough, deep breathe, Incentive Spirometry  - Assess the need for suctioning and perform as needed  - Assess and instruct to report SOB or any respiratory difficulty  - Respiratory Therapy support as indicated  - Manage/alleviate anxiety  - Monitor for signs/symptoms of CO2 retention  Outcome: Adequate for Discharge     Problem: HEMATOLOGIC - ADULT  Goal: Maintains hematologic stability  Description: INTERVENTIONS  - Assess for signs and symptoms of bleeding or hemorrhage  - Monitor labs and vital signs for trends  - Administer supportive blood products/factors, fluids and medications as ordered and appropriate  - Administer supportive blood products/factors as ordered and appropriate  Outcome: Adequate for Discharge  Goal: Free from bleeding injury  Description: (Example usage: patient with low platelets)  INTERVENTIONS:  - Avoid intramuscular injections, enemas and rectal medication administration  - Ensure safe mobilization of patient  - Hold pressure on venipuncture sites to achieve adequate hemostasis  - Assess for signs and symptoms of internal bleeding  - Monitor lab trends  - Patient is to report abnormal signs of bleeding to staff  - Avoid use of toothpicks and dental floss  - Use electric shaver for shaving  - Use soft bristle tooth brush  - Limit straining and forceful nose blowing  Outcome: Adequate for Discharge     Problem: Diabetes/Glucose Control  Goal: Glucose maintained within prescribed range  Description: INTERVENTIONS:  - Monitor Blood Glucose as ordered  - Assess for signs and symptoms of hyperglycemia and hypoglycemia  - Administer ordered medications to maintain glucose within target range  - Assess barriers to adequate nutritional intake and initiate nutrition consult as needed  - Instruct patient on self management of diabetes  Outcome: Adequate for Discharge

## 2022-03-28 NOTE — PROGRESS NOTES
[facility] Pharm[facility] Note: Route Optimization for Pantoprazole (PROTONIX)    Patient is currently on Pantoprazole (PROTONIX) 40 mg IV every 24 hours. The patient meets the criteria to convert to the oral equivalent as established by the IV to Oral conversion protocol approved by the P&T committee. Medication was changed from IV formulation to Pantoprazole (PROTONIX) 40 mg PO every 24 hours per protocol.       Norman White, PharmD  3/28/2022,  7:29 AM

## 2022-03-29 ENCOUNTER — TELEPHONE (OUTPATIENT)
Dept: INTERNAL MEDICINE CLINIC | Facility: CLINIC | Age: 57
End: 2022-03-29

## 2022-03-29 NOTE — TELEPHONE ENCOUNTER
Spoke with Nell Romberg RN from [address] [place] [address]--will be seeing patient for [place] RN and PT--s/p CABG. RN will see patient twice this week, then 1/week x 2 weeks.  PT will start April 8th, then will call with their plan of care once patient is evaluated

## 2022-03-30 ENCOUNTER — PATIENT OUTREACH (OUTPATIENT)
Dept: CASE MANAGEMENT | Age: 57
End: 2022-03-30

## 2022-03-30 ENCOUNTER — TELEPHONE (OUTPATIENT)
Dept: INTERNAL MEDICINE CLINIC | Facility: CLINIC | Age: 57
End: 2022-03-30

## 2022-03-30 NOTE — TELEPHONE ENCOUNTER
Spoke to pt for TCM today. Patient has HFU scheduled for 4/20/22 but this is outside the TCM timeframe. TCM/HFU appt recommended by 4/4822 as pt is a high risk for readmission. Please advise. BOOK BY DATE (last date for TCM): 4/11/22    TRIAGE:  Please f/u with provider and patient and attempt to schedule sooner appointment unless provider is okay with patient waiting. Thank you!

## 2022-03-31 ENCOUNTER — PATIENT MESSAGE (OUTPATIENT)
Dept: ORTHOPEDICS CLINIC | Facility: CLINIC | Age: 57
End: 2022-03-31

## 2022-03-31 ENCOUNTER — TELEPHONE (OUTPATIENT)
Dept: INTERNAL MEDICINE CLINIC | Facility: CLINIC | Age: 57
End: 2022-03-31

## 2022-03-31 NOTE — TELEPHONE ENCOUNTER
Call received from Javier Nguyen with [facility]. Patient recently discharged after CABG, patient reporting pain, is currently on Norco, he has appt tomorrow with Cardiologist. Was advised to notify cardiologist of his pain. Patient is having constipation, no BM since Mon, currently on colace twice daily. Is it ok for patient to take Miralax or is something stronger recommended?      Patient will start cardiac rehab 4/27, nurse asking if ok to add on 1 more visit to see patient before he starts his rehab, please confirm if ok.     (may leave vm if no answer)

## 2022-03-31 NOTE — TELEPHONE ENCOUNTER
From: Elias Chambers  To: [facility], MD  Sent: 3/31/2022 9:19 AM CDT  Subject: Follow up    Hi,  I went to see Dr Duncan Hernandez 2 times for steroids injection . I was told by the [address] [place] [address] representative , that I will be charged for only 1 visit of $40 copay even I went for 2 visits. Please advise why I received a letter in the mail for 2 copays.  Thank you

## 2022-04-01 NOTE — TELEPHONE ENCOUNTER
Spoke with Zen Savage RN and relayed Dr. Colette Kirby message below--she verbalizes understanding and agreement. No further questions/concerns at this time.

## 2022-04-01 NOTE — TELEPHONE ENCOUNTER
Okay for patient to take MiraLAX as needed for constipation. Cutting back on the Rachel Schraderll will also help with the constipation. I am okay with adding on an additional nurse visit as requested by the home nurse.

## 2022-04-04 ENCOUNTER — TELEPHONE (OUTPATIENT)
Dept: CASE MANAGEMENT | Facility: HOSPITAL | Age: 57
End: 2022-04-04

## 2022-04-07 ENCOUNTER — HOSPITAL ENCOUNTER (OUTPATIENT)
Dept: ULTRASOUND IMAGING | Facility: HOSPITAL | Age: 57
Discharge: HOME | End: 2022-04-07
Attending: CLINICAL NURSE SPECIALIST
Payer: COMMERCIAL

## 2022-04-07 DIAGNOSIS — M79.89 SWELLING OF LIMB: ICD-10-CM

## 2022-04-07 DIAGNOSIS — M79.605 PAIN IN LEFT LEG: ICD-10-CM

## 2022-04-07 PROCEDURE — 93971 EXTREMITY STUDY: CPT | Performed by: CLINICAL NURSE SPECIALIST

## 2022-04-08 ENCOUNTER — TELEPHONE (OUTPATIENT)
Dept: INTERNAL MEDICINE CLINIC | Facility: CLINIC | Age: 57
End: 2022-04-08

## 2022-04-08 NOTE — TELEPHONE ENCOUNTER
Sher Guillermo, [facility] health just saw patient at home stated that he is having left lower leg pain in the calf to the foot. States it is a pins and needles pain. Pain level is 8/10. Left leg is alittle swollen. Ultrasound venous doppler was negative for DVT yesterday. RN advised him to do exercises at home in the meantime. No other symptoms or complaints. Patient calling cardiologist on 4/11/2022 and has an appointment with cardiology on Thursday 4/14/2022. Sher Guillermo thinks patient might be having nerve pain and thinks could benefit from gabapentin. Wanted to see if doctor could prescribe medication? Please advise.

## 2022-04-08 NOTE — TELEPHONE ENCOUNTER
Vivien Padron- RN with [facility] called to say that patient declined a nurse visit today. He told nurse he had testing done yesterday and does not need to see anyone today. He said he would call her next week. He had a venous doppler yesterday for pain and swelling in left leg. It was negative for DVT. Spoke to patient. He said his left leg is very painful and swollen, especially when he walks. He is open to Vivien Padron coming to see him. Ledy Cruz and she will call him to reset up visit this afternoon.

## 2022-04-20 ENCOUNTER — OFFICE VISIT (OUTPATIENT)
Dept: INTERNAL MEDICINE CLINIC | Facility: CLINIC | Age: 57
End: 2022-04-20
Payer: COMMERCIAL

## 2022-04-20 VITALS
BODY MASS INDEX: 19.9 KG/M2 | WEIGHT: 139 LBS | TEMPERATURE: 98 F | SYSTOLIC BLOOD PRESSURE: 116 MMHG | RESPIRATION RATE: 18 BRPM | HEART RATE: 66 BPM | HEIGHT: 70 IN | DIASTOLIC BLOOD PRESSURE: 72 MMHG

## 2022-04-20 DIAGNOSIS — F41.9 ANXIETY: ICD-10-CM

## 2022-04-20 DIAGNOSIS — E78.5 HYPERLIPIDEMIA, UNSPECIFIED HYPERLIPIDEMIA TYPE: ICD-10-CM

## 2022-04-20 DIAGNOSIS — I10 ESSENTIAL HYPERTENSION: ICD-10-CM

## 2022-04-20 DIAGNOSIS — I25.10 CORONARY ARTERY DISEASE INVOLVING NATIVE CORONARY ARTERY OF NATIVE HEART WITHOUT ANGINA PECTORIS: Primary | ICD-10-CM

## 2022-04-20 DIAGNOSIS — Z95.1 HX OF CABG: ICD-10-CM

## 2022-04-20 DIAGNOSIS — M79.672 LEFT FOOT PAIN: ICD-10-CM

## 2022-04-20 PROCEDURE — 99214 OFFICE O/P EST MOD 30 MIN: CPT | Performed by: INTERNAL MEDICINE

## 2022-04-20 PROCEDURE — 3074F SYST BP LT 130 MM HG: CPT | Performed by: INTERNAL MEDICINE

## 2022-04-20 PROCEDURE — 3008F BODY MASS INDEX DOCD: CPT | Performed by: INTERNAL MEDICINE

## 2022-04-20 PROCEDURE — 3078F DIAST BP <80 MM HG: CPT | Performed by: INTERNAL MEDICINE

## 2022-04-22 ENCOUNTER — TELEPHONE (OUTPATIENT)
Dept: INTERNAL MEDICINE CLINIC | Facility: CLINIC | Age: 57
End: 2022-04-22

## 2022-04-22 NOTE — TELEPHONE ENCOUNTER
Nel Skill calling from [facility]    Patient maryam be discharged from [facility] on Monday , 4/25    Routing as FYI , no action is  required

## 2022-04-23 ENCOUNTER — NURSE TRIAGE (OUTPATIENT)
Dept: INTERNAL MEDICINE CLINIC | Facility: CLINIC | Age: 57
End: 2022-04-23

## 2022-04-25 NOTE — TELEPHONE ENCOUNTER
Please call the patient. It is safe for him to take Allegra or any of the over-the-counter antihistamines. However has to be the regular antihistamine. He cannot take anything with a decongestant in it. So anything with a D at the end of the name is off limits.

## 2022-04-26 ENCOUNTER — PATIENT MESSAGE (OUTPATIENT)
Dept: DERMATOLOGY CLINIC | Facility: CLINIC | Age: 57
End: 2022-04-26

## 2022-04-26 NOTE — TELEPHONE ENCOUNTER
Spoke to patient and relayed Dr. Flavio Lin message below. Patient verbalized understanding and had no further questions.

## 2022-04-27 ENCOUNTER — CARDPULM VISIT (OUTPATIENT)
Dept: CARDIAC REHAB | Facility: HOSPITAL | Age: 57
End: 2022-04-27
Attending: INTERNAL MEDICINE
Payer: COMMERCIAL

## 2022-04-28 ENCOUNTER — ORDER TRANSCRIPTION (OUTPATIENT)
Dept: CARDIAC REHAB | Facility: HOSPITAL | Age: 57
End: 2022-04-28

## 2022-04-29 ENCOUNTER — CARDPULM VISIT (OUTPATIENT)
Dept: CARDIAC REHAB | Facility: HOSPITAL | Age: 57
End: 2022-04-29
Attending: INTERNAL MEDICINE
Payer: COMMERCIAL

## 2022-04-29 NOTE — TELEPHONE ENCOUNTER
Called both home number and cell number.   On cell mailbox not set up, on home number mailbox full.  Unable to leave message.    Yes, continue along with sunscreen.

## 2022-05-02 ENCOUNTER — APPOINTMENT (OUTPATIENT)
Dept: CARDIAC REHAB | Facility: HOSPITAL | Age: 57
End: 2022-05-02
Attending: INTERNAL MEDICINE
Payer: COMMERCIAL

## 2022-05-04 ENCOUNTER — CARDPULM VISIT (OUTPATIENT)
Dept: CARDIAC REHAB | Facility: HOSPITAL | Age: 57
End: 2022-05-04
Attending: INTERNAL MEDICINE
Payer: COMMERCIAL

## 2022-05-06 ENCOUNTER — CARDPULM VISIT (OUTPATIENT)
Dept: CARDIAC REHAB | Facility: HOSPITAL | Age: 57
End: 2022-05-06
Attending: INTERNAL MEDICINE
Payer: COMMERCIAL

## 2022-05-09 ENCOUNTER — CARDPULM VISIT (OUTPATIENT)
Dept: CARDIAC REHAB | Facility: HOSPITAL | Age: 57
End: 2022-05-09
Attending: INTERNAL MEDICINE
Payer: COMMERCIAL

## 2022-05-11 ENCOUNTER — CARDPULM VISIT (OUTPATIENT)
Dept: CARDIAC REHAB | Facility: HOSPITAL | Age: 57
End: 2022-05-11
Attending: INTERNAL MEDICINE
Payer: COMMERCIAL

## 2022-05-13 ENCOUNTER — CARDPULM VISIT (OUTPATIENT)
Dept: CARDIAC REHAB | Facility: HOSPITAL | Age: 57
End: 2022-05-13
Attending: INTERNAL MEDICINE
Payer: COMMERCIAL

## 2022-05-16 ENCOUNTER — CARDPULM VISIT (OUTPATIENT)
Dept: CARDIAC REHAB | Facility: HOSPITAL | Age: 57
End: 2022-05-16
Attending: INTERNAL MEDICINE
Payer: COMMERCIAL

## 2022-05-18 ENCOUNTER — CARDPULM VISIT (OUTPATIENT)
Dept: CARDIAC REHAB | Facility: HOSPITAL | Age: 57
End: 2022-05-18
Attending: INTERNAL MEDICINE
Payer: COMMERCIAL

## 2022-05-18 PROCEDURE — 93798 PHYS/QHP OP CAR RHAB W/ECG: CPT

## 2022-05-20 ENCOUNTER — CARDPULM VISIT (OUTPATIENT)
Dept: CARDIAC REHAB | Facility: HOSPITAL | Age: 57
End: 2022-05-20
Attending: INTERNAL MEDICINE
Payer: COMMERCIAL

## 2022-05-20 PROCEDURE — 93798 PHYS/QHP OP CAR RHAB W/ECG: CPT

## 2022-05-23 ENCOUNTER — CARDPULM VISIT (OUTPATIENT)
Dept: CARDIAC REHAB | Facility: HOSPITAL | Age: 57
End: 2022-05-23
Attending: INTERNAL MEDICINE
Payer: COMMERCIAL

## 2022-05-23 PROCEDURE — 93798 PHYS/QHP OP CAR RHAB W/ECG: CPT

## 2022-05-25 ENCOUNTER — CARDPULM VISIT (OUTPATIENT)
Dept: CARDIAC REHAB | Facility: HOSPITAL | Age: 57
End: 2022-05-25
Attending: INTERNAL MEDICINE
Payer: COMMERCIAL

## 2022-05-25 PROCEDURE — 93798 PHYS/QHP OP CAR RHAB W/ECG: CPT

## 2022-05-27 ENCOUNTER — CARDPULM VISIT (OUTPATIENT)
Dept: CARDIAC REHAB | Facility: HOSPITAL | Age: 57
End: 2022-05-27
Attending: INTERNAL MEDICINE
Payer: COMMERCIAL

## 2022-05-27 PROCEDURE — 93798 PHYS/QHP OP CAR RHAB W/ECG: CPT

## 2022-06-01 ENCOUNTER — CARDPULM VISIT (OUTPATIENT)
Dept: CARDIAC REHAB | Facility: HOSPITAL | Age: 57
End: 2022-06-01
Attending: INTERNAL MEDICINE
Payer: COMMERCIAL

## 2022-06-01 PROCEDURE — 93798 PHYS/QHP OP CAR RHAB W/ECG: CPT

## 2022-06-03 ENCOUNTER — CARDPULM VISIT (OUTPATIENT)
Dept: CARDIAC REHAB | Facility: HOSPITAL | Age: 57
End: 2022-06-03
Attending: INTERNAL MEDICINE
Payer: COMMERCIAL

## 2022-06-03 PROCEDURE — 93798 PHYS/QHP OP CAR RHAB W/ECG: CPT

## 2022-06-06 ENCOUNTER — CARDPULM VISIT (OUTPATIENT)
Dept: CARDIAC REHAB | Facility: HOSPITAL | Age: 57
End: 2022-06-06
Attending: INTERNAL MEDICINE
Payer: COMMERCIAL

## 2022-06-06 ENCOUNTER — PATIENT MESSAGE (OUTPATIENT)
Dept: INTERNAL MEDICINE CLINIC | Facility: CLINIC | Age: 57
End: 2022-06-06

## 2022-06-06 PROCEDURE — 93798 PHYS/QHP OP CAR RHAB W/ECG: CPT

## 2022-06-07 NOTE — TELEPHONE ENCOUNTER
From: Vi Hester  To: Nahum Estrada MD  Sent: 6/6/2022 7:00 PM CDT  Subject: Art Mao,   My ears is ringing a lot and I feel congested. I am using nasal spray everyday and sometime I take Alegra. Is there anything else that I can do? Thank you.

## 2022-06-08 ENCOUNTER — CARDPULM VISIT (OUTPATIENT)
Dept: CARDIAC REHAB | Facility: HOSPITAL | Age: 57
End: 2022-06-08
Attending: INTERNAL MEDICINE
Payer: COMMERCIAL

## 2022-06-08 ENCOUNTER — NURSE TRIAGE (OUTPATIENT)
Dept: INTERNAL MEDICINE CLINIC | Facility: CLINIC | Age: 57
End: 2022-06-08

## 2022-06-08 PROCEDURE — 93798 PHYS/QHP OP CAR RHAB W/ECG: CPT

## 2022-06-10 ENCOUNTER — CARDPULM VISIT (OUTPATIENT)
Dept: CARDIAC REHAB | Facility: HOSPITAL | Age: 57
End: 2022-06-10
Attending: INTERNAL MEDICINE
Payer: COMMERCIAL

## 2022-06-10 ENCOUNTER — OFFICE VISIT (OUTPATIENT)
Dept: OTOLARYNGOLOGY | Facility: CLINIC | Age: 57
End: 2022-06-10
Payer: COMMERCIAL

## 2022-06-10 VITALS — TEMPERATURE: 98 F

## 2022-06-10 DIAGNOSIS — H61.23 BILATERAL IMPACTED CERUMEN: ICD-10-CM

## 2022-06-10 DIAGNOSIS — H93.13 TINNITUS OF BOTH EARS: ICD-10-CM

## 2022-06-10 DIAGNOSIS — H90.3 SENSORINEURAL HEARING LOSS (SNHL) OF BOTH EARS: Primary | ICD-10-CM

## 2022-06-10 DIAGNOSIS — Z91.09 ENVIRONMENTAL ALLERGIES: ICD-10-CM

## 2022-06-10 PROCEDURE — 69210 REMOVE IMPACTED EAR WAX UNI: CPT | Performed by: OTOLARYNGOLOGY

## 2022-06-10 PROCEDURE — 93798 PHYS/QHP OP CAR RHAB W/ECG: CPT

## 2022-06-10 PROCEDURE — 99204 OFFICE O/P NEW MOD 45 MIN: CPT | Performed by: OTOLARYNGOLOGY

## 2022-06-10 RX ORDER — LEVOCETIRIZINE DIHYDROCHLORIDE 5 MG/1
5 TABLET, FILM COATED ORAL EVERY EVENING
Qty: 90 TABLET | Refills: 4 | Status: SHIPPED | OUTPATIENT
Start: 2022-06-10

## 2022-06-13 ENCOUNTER — TELEPHONE (OUTPATIENT)
Dept: OTOLARYNGOLOGY | Facility: CLINIC | Age: 57
End: 2022-06-13

## 2022-06-13 ENCOUNTER — CARDPULM VISIT (OUTPATIENT)
Dept: CARDIAC REHAB | Facility: HOSPITAL | Age: 57
End: 2022-06-13
Attending: INTERNAL MEDICINE
Payer: COMMERCIAL

## 2022-06-13 ENCOUNTER — PATIENT MESSAGE (OUTPATIENT)
Dept: OTOLARYNGOLOGY | Facility: CLINIC | Age: 57
End: 2022-06-13

## 2022-06-13 PROCEDURE — 93798 PHYS/QHP OP CAR RHAB W/ECG: CPT

## 2022-06-15 ENCOUNTER — CARDPULM VISIT (OUTPATIENT)
Dept: CARDIAC REHAB | Facility: HOSPITAL | Age: 57
End: 2022-06-15
Attending: INTERNAL MEDICINE
Payer: COMMERCIAL

## 2022-06-15 LAB
(T11) IGE: <0.1 KU/L
(T8) IGE: <0.1 KU/L
ALMOND (F20) IGE: <0.1 KU/L
ALTERNARIA ALTERNATA (M6) IGE: <0.1 KU/L
ALTERNARIA ALTERNATA (M6) IGE: <0.1 KU/L
ASPERGILLUS FUMIGATUS (M3) IGE: <0.1 KU/L
BERMUDA GRASS (G2) IGE: <0.1 KU/L
CASHEW NUT (F202) IGE: <0.1 KU/L
CAT DANDER (E1) IGE: <0.1 KU/L
CAT DANDER (E1) IGE: <0.1 KU/L
CLADOSPORIUM HERBARUM (M2) IGE: <0.1 KU/L
CLADOSPORIUM HERBARUM (M2) IGE: <0.1 KU/L
CLASS: 0
COCKROACH (I6) IGE: <0.1 KU/L
CODFISH (F3) IGE: <0.1 KU/L
COMMON RAGWEED (SHORT)$(W1) IGE: <0.1 KU/L
COMMON RAGWEED (SHORT)$(W1) IGE: <0.1 KU/L
COTTONWOOD (T14) IGE: <0.1 KU/L
DERMATOPHAGOIDES FARINAE (D2) IGE: <0.1 KU/L
DERMATOPHAGOIDES FARINAE (D2) IGE: <0.1 KU/L
DERMATOPHAGOIDES PTERONYSSINUS (D1) IGE: <0.1 KU/L
DOG DANDER (E5) IGE: <0.1 KU/L
DOG DANDER (E5) IGE: <0.1 KU/L
EGG WHITE (F1) IGE: <0.1 KU/L
HAZELNUT-FOOD, IGE: <0.1 KU/L
HICKORY/PECAN TREE (T22)$IGE: <0.1 KU/L
IMMUNOGLOBULIN E: 20 KU/L
JUNE GRASS, IGE: <0.1 KU/L
LAMB'S QUARTERS (GOOSE FOOT) (W10) IGE: <0.1 KU/L
MAPLE (BOX ELDER) (T1)$IGE: <0.1 KU/L
MILK (F2) IGE: <0.1 KU/L
MOUNTAIN CEDAR (T6) IGE: <0.1 KU/L
MOUSE URINE PROTEINS (E72) IGE: <0.1 KU/L
OAK (T7) IGE: <0.1 KU/L
OAK (T7) IGE: <0.1 KU/L
PEANUT (F13) IGE: <0.1 KU/L
PENICILLIUM NOTATUM (M1) IGE: <0.1 KU/L
ROUGH MARSH ELDER (W16)$IGE: <0.1 KU/L
ROUGH PIGWEED (W14) IGE: <0.1 KU/L
RUSSIAN THISTLE (W11) IGE: <0.1 KU/L
SALMON, IGE: <0.1 KU/L
SCALLOP (F338) IGE: <0.1 KU/L
SESAME SEED (F10) IGE: <0.1 KU/L
SHRIMP (F24) IGE: <0.1 KU/L
SOYBEAN (F14) IGE: <0.1 KU/L
TIMOTHY GRASS (G6) IGE: <0.1 KU/L
TIMOTHY GRASS (G6) IGE: <0.1 KU/L
TUNA (F40) IGE: <0.1 KU/L
WALNUT (F256) IGE: <0.1 KU/L
WALNUT TREE (T10) IGE: <0.1 KU/L
WHEAT (F4) IGE: <0.1 KU/L
WHITE ASH (T15) IGE: <0.1 KU/L
WHITE MULBERRY (T70) IGE: <0.1 KU/L

## 2022-06-15 PROCEDURE — 93798 PHYS/QHP OP CAR RHAB W/ECG: CPT

## 2022-06-17 ENCOUNTER — PATIENT MESSAGE (OUTPATIENT)
Dept: OTOLARYNGOLOGY | Facility: CLINIC | Age: 57
End: 2022-06-17

## 2022-06-17 ENCOUNTER — CARDPULM VISIT (OUTPATIENT)
Dept: CARDIAC REHAB | Facility: HOSPITAL | Age: 57
End: 2022-06-17
Attending: INTERNAL MEDICINE
Payer: COMMERCIAL

## 2022-06-17 PROCEDURE — 93798 PHYS/QHP OP CAR RHAB W/ECG: CPT

## 2022-06-20 ENCOUNTER — CARDPULM VISIT (OUTPATIENT)
Dept: CARDIAC REHAB | Facility: HOSPITAL | Age: 57
End: 2022-06-20
Attending: INTERNAL MEDICINE
Payer: COMMERCIAL

## 2022-06-20 PROCEDURE — 93798 PHYS/QHP OP CAR RHAB W/ECG: CPT

## 2022-06-20 NOTE — TELEPHONE ENCOUNTER
From: Alvaro Wren  Sent: 6/17/2022 1:57 PM CDT  To: Em Ent Clinical Staff  Subject: Results    Hi,  Yes I did received the allergies test results. Thank you!

## 2022-06-22 ENCOUNTER — CARDPULM VISIT (OUTPATIENT)
Dept: CARDIAC REHAB | Facility: HOSPITAL | Age: 57
End: 2022-06-22
Attending: INTERNAL MEDICINE
Payer: COMMERCIAL

## 2022-06-22 PROCEDURE — 93798 PHYS/QHP OP CAR RHAB W/ECG: CPT

## 2022-06-23 ENCOUNTER — TELEPHONE (OUTPATIENT)
Dept: INTERNAL MEDICINE CLINIC | Facility: CLINIC | Age: 57
End: 2022-06-23

## 2022-06-23 ENCOUNTER — PATIENT MESSAGE (OUTPATIENT)
Dept: INTERNAL MEDICINE CLINIC | Facility: CLINIC | Age: 57
End: 2022-06-23

## 2022-06-23 NOTE — TELEPHONE ENCOUNTER
Patient called requesting a call back from the nurse or Dr. Kong Members. Patient would like to know if he can use a nasal wash with salt for his allergies. Patient had bypass 3 months ago and is on certain medications.

## 2022-06-23 NOTE — TELEPHONE ENCOUNTER
Changed from physician to call to acute encounter:    Doris Mitchell MD 21 minutes ago (12:09 PM)           Hi,  I am having ringing in the ears and stuffy nose.    Thanks  [employer]

## 2022-06-23 NOTE — TELEPHONE ENCOUNTER
Wong Shine RN 6/23/2022 11:24 AM CDT        ----- Message -----  From: Malou Schreiber  Sent: 6/23/2022 10:22 AM CDT  To: Em Rn Triage  Subject: Neti pot     Good morning,   I am having allergies and I want to use the neti pot. Since I had a bypass surgery 3 months ago and I am on medication. Can I still use the neti pot. Thanks.

## 2022-06-24 ENCOUNTER — CARDPULM VISIT (OUTPATIENT)
Dept: CARDIAC REHAB | Facility: HOSPITAL | Age: 57
End: 2022-06-24
Attending: INTERNAL MEDICINE
Payer: COMMERCIAL

## 2022-06-24 PROCEDURE — 93798 PHYS/QHP OP CAR RHAB W/ECG: CPT

## 2022-06-27 ENCOUNTER — CARDPULM VISIT (OUTPATIENT)
Dept: CARDIAC REHAB | Facility: HOSPITAL | Age: 57
End: 2022-06-27
Attending: INTERNAL MEDICINE
Payer: COMMERCIAL

## 2022-06-27 PROCEDURE — 93798 PHYS/QHP OP CAR RHAB W/ECG: CPT

## 2022-06-29 ENCOUNTER — CARDPULM VISIT (OUTPATIENT)
Dept: CARDIAC REHAB | Facility: HOSPITAL | Age: 57
End: 2022-06-29
Attending: INTERNAL MEDICINE
Payer: COMMERCIAL

## 2022-06-29 ENCOUNTER — TELEPHONE (OUTPATIENT)
Dept: OTOLARYNGOLOGY | Facility: CLINIC | Age: 57
End: 2022-06-29

## 2022-06-29 PROCEDURE — 93798 PHYS/QHP OP CAR RHAB W/ECG: CPT

## 2022-06-29 NOTE — TELEPHONE ENCOUNTER
Spoke with patient. States his ears feel congested and the ringing is getting worse. Denies ear pain, some head and nasal congestion. Denies fever, body aches or chills. Taking xyzal daily. Patient inquires on next steps.

## 2022-06-30 ENCOUNTER — PATIENT MESSAGE (OUTPATIENT)
Dept: OTOLARYNGOLOGY | Facility: CLINIC | Age: 57
End: 2022-06-30

## 2022-07-01 ENCOUNTER — CARDPULM VISIT (OUTPATIENT)
Dept: CARDIAC REHAB | Facility: HOSPITAL | Age: 57
End: 2022-07-01
Attending: INTERNAL MEDICINE
Payer: COMMERCIAL

## 2022-07-01 PROCEDURE — 93798 PHYS/QHP OP CAR RHAB W/ECG: CPT

## 2022-07-03 NOTE — TELEPHONE ENCOUNTER
Dr. Christine Madrid- please advise
Email composed/sent.
Pt is requesting results from polyp that was found and sent to the lab please call thank you
no

## 2022-07-06 ENCOUNTER — CARDPULM VISIT (OUTPATIENT)
Dept: CARDIAC REHAB | Facility: HOSPITAL | Age: 57
End: 2022-07-06
Attending: INTERNAL MEDICINE
Payer: COMMERCIAL

## 2022-07-06 PROCEDURE — 93798 PHYS/QHP OP CAR RHAB W/ECG: CPT

## 2022-07-08 ENCOUNTER — CARDPULM VISIT (OUTPATIENT)
Dept: CARDIAC REHAB | Facility: HOSPITAL | Age: 57
End: 2022-07-08
Attending: INTERNAL MEDICINE
Payer: COMMERCIAL

## 2022-07-08 PROCEDURE — 93798 PHYS/QHP OP CAR RHAB W/ECG: CPT

## 2022-07-11 ENCOUNTER — CARDPULM VISIT (OUTPATIENT)
Dept: CARDIAC REHAB | Facility: HOSPITAL | Age: 57
End: 2022-07-11
Attending: INTERNAL MEDICINE
Payer: COMMERCIAL

## 2022-07-11 PROCEDURE — 93798 PHYS/QHP OP CAR RHAB W/ECG: CPT

## 2022-07-13 ENCOUNTER — CARDPULM VISIT (OUTPATIENT)
Dept: CARDIAC REHAB | Facility: HOSPITAL | Age: 57
End: 2022-07-13
Attending: INTERNAL MEDICINE
Payer: COMMERCIAL

## 2022-07-13 PROCEDURE — 93798 PHYS/QHP OP CAR RHAB W/ECG: CPT

## 2022-07-15 ENCOUNTER — CARDPULM VISIT (OUTPATIENT)
Dept: CARDIAC REHAB | Facility: HOSPITAL | Age: 57
End: 2022-07-15
Attending: INTERNAL MEDICINE
Payer: COMMERCIAL

## 2022-07-15 PROCEDURE — 93798 PHYS/QHP OP CAR RHAB W/ECG: CPT

## 2022-07-18 ENCOUNTER — APPOINTMENT (OUTPATIENT)
Dept: CARDIAC REHAB | Facility: HOSPITAL | Age: 57
End: 2022-07-18
Attending: INTERNAL MEDICINE
Payer: COMMERCIAL

## 2022-07-20 ENCOUNTER — APPOINTMENT (OUTPATIENT)
Dept: CARDIAC REHAB | Facility: HOSPITAL | Age: 57
End: 2022-07-20
Attending: INTERNAL MEDICINE
Payer: COMMERCIAL

## 2022-07-22 ENCOUNTER — APPOINTMENT (OUTPATIENT)
Dept: CARDIAC REHAB | Facility: HOSPITAL | Age: 57
End: 2022-07-22
Attending: INTERNAL MEDICINE
Payer: COMMERCIAL

## 2022-07-25 ENCOUNTER — APPOINTMENT (OUTPATIENT)
Dept: CARDIAC REHAB | Facility: HOSPITAL | Age: 57
End: 2022-07-25
Attending: INTERNAL MEDICINE
Payer: COMMERCIAL

## 2022-07-27 ENCOUNTER — APPOINTMENT (OUTPATIENT)
Dept: CARDIAC REHAB | Facility: HOSPITAL | Age: 57
End: 2022-07-27
Attending: INTERNAL MEDICINE
Payer: COMMERCIAL

## 2022-07-29 ENCOUNTER — APPOINTMENT (OUTPATIENT)
Dept: CARDIAC REHAB | Facility: HOSPITAL | Age: 57
End: 2022-07-29
Attending: INTERNAL MEDICINE
Payer: COMMERCIAL

## 2022-07-31 ENCOUNTER — PATIENT MESSAGE (OUTPATIENT)
Dept: INTERNAL MEDICINE CLINIC | Facility: CLINIC | Age: 57
End: 2022-07-31

## 2022-07-31 NOTE — TELEPHONE ENCOUNTER
[employer] message sent. From: Ena Mckeon  To: Nehemiah Doan MD  Sent: 7/31/2022  9:27 AM CDT  Subject: Advice    Hi,  I have gas and tummy ache. Please let me know what medication that I can take. I took sofgel containing\"semithicone\" and still have gas. Thank you.

## 2022-08-04 ENCOUNTER — OFFICE VISIT (OUTPATIENT)
Dept: INTERNAL MEDICINE CLINIC | Facility: CLINIC | Age: 57
End: 2022-08-04
Payer: COMMERCIAL

## 2022-08-04 VITALS
BODY MASS INDEX: 19.9 KG/M2 | WEIGHT: 139 LBS | HEART RATE: 55 BPM | DIASTOLIC BLOOD PRESSURE: 80 MMHG | SYSTOLIC BLOOD PRESSURE: 123 MMHG | HEIGHT: 70 IN

## 2022-08-04 DIAGNOSIS — K92.89 GAS BLOAT SYNDROME: Primary | ICD-10-CM

## 2022-08-04 PROCEDURE — 3079F DIAST BP 80-89 MM HG: CPT | Performed by: NURSE PRACTITIONER

## 2022-08-04 PROCEDURE — 3008F BODY MASS INDEX DOCD: CPT | Performed by: NURSE PRACTITIONER

## 2022-08-04 PROCEDURE — 99214 OFFICE O/P EST MOD 30 MIN: CPT | Performed by: NURSE PRACTITIONER

## 2022-08-04 PROCEDURE — 3074F SYST BP LT 130 MM HG: CPT | Performed by: NURSE PRACTITIONER

## 2022-08-04 RX ORDER — SACCHAROMYCES BOULARDII 250 MG
250 CAPSULE ORAL 2 TIMES DAILY
Qty: 60 CAPSULE | Refills: 0 | Status: SHIPPED | OUTPATIENT
Start: 2022-08-04

## 2022-09-03 ENCOUNTER — TELEPHONE (OUTPATIENT)
Dept: INTERNAL MEDICINE CLINIC | Facility: CLINIC | Age: 57
End: 2022-09-03

## 2022-09-13 ENCOUNTER — PATIENT MESSAGE (OUTPATIENT)
Dept: INTERNAL MEDICINE CLINIC | Facility: CLINIC | Age: 57
End: 2022-09-13

## 2022-09-13 NOTE — TELEPHONE ENCOUNTER
From: Yuliana De La Cruz  To: NITESH Torres  Sent: 9/13/2022 7:44 AM CDT  Subject: Excessive gas    Good morning. I came to see you a month ago about the gas issue that I was having. Currently I am still having the same issue (gas). Should I follow the same diet and take the medication twice a day that was prescribed previously. Please advise.

## 2022-09-13 NOTE — TELEPHONE ENCOUNTER
Pt called back and he stated that he wanted to cancel his appt for today at 10 am. I informed pt that he has no appt for today. The only one I see is for 9/19. Pt stated that he will like the appt to be cancelled. Pt appt has been cancelled.     Appt was cancelled her pt request.   Future Appointments   Date Time Provider Rafaela Bere   9/25/2022 10:50 AM [employer] [facility] [address] [place] [address]   11/7/2022  6:00 PM Deb Max MD [facility]   11/12/2022 10:20 AM Patrick Coates MD [facility]

## 2022-09-13 NOTE — TELEPHONE ENCOUNTER
Called patient, as last telehealth visit was 6 weeks ago, advised follow-up appt. Patient agreeable    States his symptoms had resolved, stopped the probiotic two weeks ago. Within past few days, symptoms have returned.   Denies vomiting or diarrhea    Offered office visit with JIMBO Cooper provider, prefers virtual visit    Future Appointments   Date Time Provider Rafaela Blackburn   9/19/2022 10:00 AM NITESH [place]

## 2022-09-13 NOTE — TELEPHONE ENCOUNTER
Called patient and he stated the probiotic worked and the diet relieved his gas. He now stopped and the gas returned. Patient stated he can be on the probiotic for a long time and instructed him to resume. Patient is in agreement with this plan.

## 2022-10-09 ENCOUNTER — IMMUNIZATION (OUTPATIENT)
Dept: LAB | Age: 57
End: 2022-10-09
Attending: EMERGENCY MEDICINE
Payer: COMMERCIAL

## 2022-10-09 DIAGNOSIS — Z23 NEED FOR VACCINATION: Primary | ICD-10-CM

## 2022-10-09 PROCEDURE — 0124A SARSCOV2 VAC BVL 30MCG/0.3ML: CPT

## 2022-10-12 LAB — AMB EXT COVID-19 RESULT: DETECTED

## 2022-10-13 ENCOUNTER — TELEMEDICINE (OUTPATIENT)
Dept: INTERNAL MEDICINE CLINIC | Facility: CLINIC | Age: 57
End: 2022-10-13

## 2022-10-13 ENCOUNTER — NURSE TRIAGE (OUTPATIENT)
Dept: INTERNAL MEDICINE CLINIC | Facility: CLINIC | Age: 57
End: 2022-10-13

## 2022-10-13 DIAGNOSIS — D64.89 ANEMIA DUE TO OTHER CAUSE, NOT CLASSIFIED: Primary | ICD-10-CM

## 2022-10-13 DIAGNOSIS — U07.1 COVID-19: ICD-10-CM

## 2022-10-13 PROCEDURE — 99213 OFFICE O/P EST LOW 20 MIN: CPT | Performed by: NURSE PRACTITIONER

## 2022-10-13 NOTE — ASSESSMENT & PLAN NOTE
Plan  1) Please follow strict handwashing  2) Isolate 5 days  3) Multi-vitamin with zinc 25 mg daily  4) Vitamin C 1000 mg  daily  5) Use separate bathrooms if possible  6) Wear a mask  7) Tylenol 2 tabs every 6 hours. Not exceed 4000 mg in a 24-hour. 8) Vitamin D3 2000 units daily.   9) Hydrate with fluid

## 2022-11-03 ENCOUNTER — NURSE TRIAGE (OUTPATIENT)
Dept: INTERNAL MEDICINE CLINIC | Facility: CLINIC | Age: 57
End: 2022-11-03

## 2022-11-07 ENCOUNTER — OFFICE VISIT (OUTPATIENT)
Dept: DERMATOLOGY CLINIC | Facility: CLINIC | Age: 57
End: 2022-11-07
Payer: COMMERCIAL

## 2022-11-07 DIAGNOSIS — L81.4 LENTIGO: ICD-10-CM

## 2022-11-07 DIAGNOSIS — L81.9 HYPERPIGMENTATION: Primary | ICD-10-CM

## 2022-11-07 DIAGNOSIS — L81.9 DYSCHROMIA: ICD-10-CM

## 2022-11-07 PROCEDURE — 99213 OFFICE O/P EST LOW 20 MIN: CPT | Performed by: DERMATOLOGY

## 2022-11-07 RX ORDER — TAZAROTENE 1 MG/G
CREAM TOPICAL
Qty: 60 G | Refills: 6 | Status: SHIPPED | OUTPATIENT
Start: 2022-11-07

## 2022-11-07 RX ORDER — FLUOCINOLONE ACETONIDE, HYDROQUINONE, AND TRETINOIN .1; 40; .5 MG/G; MG/G; MG/G
CREAM TOPICAL
Qty: 30 G | Refills: 6 | Status: SHIPPED | OUTPATIENT
Start: 2022-11-07

## 2022-11-07 RX ORDER — COVID-19 ANTIGEN TEST
KIT MISCELLANEOUS
COMMUNITY
Start: 2022-10-12 | End: 2022-11-12 | Stop reason: ALTCHOICE

## 2022-11-10 ENCOUNTER — TELEPHONE (OUTPATIENT)
Dept: DERMATOLOGY CLINIC | Facility: CLINIC | Age: 57
End: 2022-11-10

## 2022-11-10 NOTE — TELEPHONE ENCOUNTER
Fax from [address] [place] [address] required for TAZAROTENE 0.1% CREAM    Placed fax in pa inbox

## 2022-11-11 NOTE — TELEPHONE ENCOUNTER
LMTCB to clarify if patient would like to proceed with PA or if [address] [facility] was used in the past.

## 2022-11-11 NOTE — TELEPHONE ENCOUNTER
This was filled at [facility], not clear if PA done before, but ok to send to [facility] if needed and ok to attempt pa, medication has been helpful

## 2022-11-12 ENCOUNTER — OFFICE VISIT (OUTPATIENT)
Dept: INTERNAL MEDICINE CLINIC | Facility: CLINIC | Age: 57
End: 2022-11-12
Payer: COMMERCIAL

## 2022-11-12 VITALS
DIASTOLIC BLOOD PRESSURE: 72 MMHG | TEMPERATURE: 97 F | BODY MASS INDEX: 20.04 KG/M2 | HEART RATE: 66 BPM | RESPIRATION RATE: 18 BRPM | SYSTOLIC BLOOD PRESSURE: 108 MMHG | HEIGHT: 70 IN | WEIGHT: 140 LBS

## 2022-11-12 DIAGNOSIS — U07.1 COVID-19: ICD-10-CM

## 2022-11-12 DIAGNOSIS — I10 ESSENTIAL HYPERTENSION: ICD-10-CM

## 2022-11-12 DIAGNOSIS — F41.9 ANXIETY: ICD-10-CM

## 2022-11-12 DIAGNOSIS — I25.10 CORONARY ARTERY DISEASE INVOLVING NATIVE CORONARY ARTERY OF NATIVE HEART WITHOUT ANGINA PECTORIS: Primary | ICD-10-CM

## 2022-11-12 DIAGNOSIS — K92.89 GAS BLOAT SYNDROME: ICD-10-CM

## 2022-11-12 DIAGNOSIS — Z95.1 HX OF CABG: ICD-10-CM

## 2022-11-12 PROCEDURE — 99214 OFFICE O/P EST MOD 30 MIN: CPT | Performed by: INTERNAL MEDICINE

## 2022-11-12 PROCEDURE — 3078F DIAST BP <80 MM HG: CPT | Performed by: INTERNAL MEDICINE

## 2022-11-12 PROCEDURE — 3008F BODY MASS INDEX DOCD: CPT | Performed by: INTERNAL MEDICINE

## 2022-11-12 PROCEDURE — 3074F SYST BP LT 130 MM HG: CPT | Performed by: INTERNAL MEDICINE

## 2022-11-23 ENCOUNTER — TELEPHONE (OUTPATIENT)
Dept: INTERNAL MEDICINE CLINIC | Facility: CLINIC | Age: 57
End: 2022-11-23

## 2022-11-25 NOTE — TELEPHONE ENCOUNTER
I am not sure that the metoprolol is causing his symptoms. In addition, I do not feel comfortable stopping it unless we get approval from the cardiologist.  He can contact cardiology office and discussed this issue with them. Discussed the fact that he is having increasing gas issues that are quite troublesome to him and nothing else has worked at this point. They may or may not agree with stopping or changing the metoprolol.

## 2022-12-13 NOTE — TELEPHONE ENCOUNTER
From: Yogi Hanson  To: Katerin Cardoso MD  Sent: 5/30/2019 4:15 PM CDT  Subject: Test Results Question    Hi,  I talk to a nurse over the phone and my stress test results was good. Can you please provide me the full test result through my chart. Prednisone Counseling:  I discussed with the patient the risks of prolonged use of prednisone including but not limited to weight gain, insomnia, osteoporosis, mood changes, diabetes, susceptibility to infection, glaucoma and high blood pressure.  In cases where prednisone use is prolonged, patients should be monitored with blood pressure checks, serum glucose levels and an eye exam.  Additionally, the patient may need to be placed on GI prophylaxis, PCP prophylaxis, and calcium and vitamin D supplementation and/or a bisphosphonate.  The patient verbalized understanding of the proper use and the possible adverse effects of prednisone.  All of the patient's questions and concerns were addressed.

## 2022-12-20 ENCOUNTER — NURSE TRIAGE (OUTPATIENT)
Dept: INTERNAL MEDICINE CLINIC | Facility: CLINIC | Age: 57
End: 2022-12-20

## 2023-01-11 ENCOUNTER — TELEPHONE (OUTPATIENT)
Dept: INTERNAL MEDICINE CLINIC | Facility: CLINIC | Age: 58
End: 2023-01-11

## 2023-01-11 RX ORDER — CARVEDILOL 3.12 MG/1
TABLET ORAL
Qty: 180 TABLET | COMMUNITY
Start: 2023-01-11

## 2023-01-11 NOTE — TELEPHONE ENCOUNTER
Patient should definitely consider diet changes if he is not already on it. Start by stopping all dairy products for a week or 2. May also want to cut out gluten products and see if that helps. Continue with over-the-counter medications as discussed at his last visit. If problems persist and it is bothersome, we can have him see one of our GI doctors/APCs.

## 2023-01-11 NOTE — TELEPHONE ENCOUNTER
Patient states he is still having \"gas and bloating, burping, can hear my tummy making some noise\"  Patient has tried probiotics and simethicone with some relief. Patient asking Delores Ricardo for next step. He was advised at last appointment to contact his cardiologist if symptoms persisted. Patient  contacted his cardiologist and the metoprolol tartrate was discontinued. Patient now takes carvedilol 3.125 mg 1 in the morning and 2 at night. He has appointment with Rocky Benson tomorrow at 1:30pm Medication list updated.

## 2023-01-11 NOTE — TELEPHONE ENCOUNTER
Spoke to patient and relayed Dr. Marjan Burkett message below. Patient verbalized understanding. He asked that information be sent to him via [employer].

## 2023-01-13 ENCOUNTER — PATIENT MESSAGE (OUTPATIENT)
Dept: INTERNAL MEDICINE CLINIC | Facility: CLINIC | Age: 58
End: 2023-01-13

## 2023-01-13 NOTE — TELEPHONE ENCOUNTER
Eleuterio Edwards RN 1/13/2023 11:46 AM CST        ----- Message -----  From: Yuliana De La Cruz  Sent: 1/13/2023 9:17 AM CST  To: Em Rn Triage  Subject: Neti pot     Good morning, I feel congested, can I use the neti pot? Is there any other recommendation as such as nasal spray?  Thanks

## 2023-02-11 ENCOUNTER — OFFICE VISIT (OUTPATIENT)
Dept: INTERNAL MEDICINE CLINIC | Facility: CLINIC | Age: 58
End: 2023-02-11

## 2023-02-11 VITALS
WEIGHT: 141 LBS | TEMPERATURE: 98 F | RESPIRATION RATE: 18 BRPM | SYSTOLIC BLOOD PRESSURE: 114 MMHG | DIASTOLIC BLOOD PRESSURE: 70 MMHG | HEART RATE: 58 BPM | HEIGHT: 70 IN | BODY MASS INDEX: 20.19 KG/M2

## 2023-02-11 DIAGNOSIS — L64.9 MALE PATTERN BALDNESS: ICD-10-CM

## 2023-02-11 DIAGNOSIS — F41.9 ANXIETY: ICD-10-CM

## 2023-02-11 DIAGNOSIS — E78.5 HYPERLIPIDEMIA, UNSPECIFIED HYPERLIPIDEMIA TYPE: ICD-10-CM

## 2023-02-11 DIAGNOSIS — Z00.00 ROUTINE PHYSICAL EXAMINATION: Primary | ICD-10-CM

## 2023-02-11 DIAGNOSIS — I25.10 CORONARY ARTERY DISEASE INVOLVING NATIVE CORONARY ARTERY OF NATIVE HEART WITHOUT ANGINA PECTORIS: ICD-10-CM

## 2023-02-11 DIAGNOSIS — Z23 NEED FOR VACCINATION: ICD-10-CM

## 2023-02-11 DIAGNOSIS — I10 ESSENTIAL HYPERTENSION: ICD-10-CM

## 2023-02-11 PROCEDURE — 3008F BODY MASS INDEX DOCD: CPT | Performed by: INTERNAL MEDICINE

## 2023-02-11 PROCEDURE — 90715 TDAP VACCINE 7 YRS/> IM: CPT | Performed by: INTERNAL MEDICINE

## 2023-02-11 PROCEDURE — 3074F SYST BP LT 130 MM HG: CPT | Performed by: INTERNAL MEDICINE

## 2023-02-11 PROCEDURE — 3078F DIAST BP <80 MM HG: CPT | Performed by: INTERNAL MEDICINE

## 2023-02-11 PROCEDURE — 99396 PREV VISIT EST AGE 40-64: CPT | Performed by: INTERNAL MEDICINE

## 2023-02-11 PROCEDURE — 90471 IMMUNIZATION ADMIN: CPT | Performed by: INTERNAL MEDICINE

## 2023-02-11 PROCEDURE — 90677 PCV20 VACCINE IM: CPT | Performed by: INTERNAL MEDICINE

## 2023-02-11 PROCEDURE — 90472 IMMUNIZATION ADMIN EACH ADD: CPT | Performed by: INTERNAL MEDICINE

## 2023-02-11 RX ORDER — FINASTERIDE 1 MG/1
1 TABLET, FILM COATED ORAL DAILY
Qty: 30 TABLET | Refills: 5 | Status: SHIPPED | OUTPATIENT
Start: 2023-02-11

## 2023-02-14 LAB
ABSOLUTE BASOPHILS: 38 CELLS/UL (ref 0–200)
ABSOLUTE EOSINOPHILS: 81 CELLS/UL (ref 15–500)
ABSOLUTE LYMPHOCYTES: 983 CELLS/UL (ref 850–3900)
ABSOLUTE MONOCYTES: 556 CELLS/UL (ref 200–950)
ABSOLUTE NEUTROPHILS: 3742 CELLS/UL (ref 1500–7800)
ALBUMIN/GLOBULIN RATIO: 1.8 (CALC) (ref 1–2.5)
ALBUMIN: 4.2 G/DL (ref 3.6–5.1)
ALKALINE PHOSPHATASE: 60 U/L (ref 35–144)
ALT: 20 U/L (ref 9–46)
AST: 20 U/L (ref 10–35)
BASOPHILS: 0.7 %
BILIRUBIN, TOTAL: 0.6 MG/DL (ref 0.2–1.2)
BUN/CREATININE RATIO: 24 (CALC) (ref 6–22)
BUN: 26 MG/DL (ref 7–25)
CALCIUM: 8.9 MG/DL (ref 8.6–10.3)
CARBON DIOXIDE: 26 MMOL/L (ref 20–32)
CHLORIDE: 105 MMOL/L (ref 98–110)
CHOL/HDLC RATIO: 2.6 (CALC)
CHOLESTEROL, TOTAL: 97 MG/DL
CREATININE: 1.08 MG/DL (ref 0.7–1.3)
EGFR: 81 ML/MIN/1.73M2
EOSINOPHILS: 1.5 %
GLOBULIN: 2.4 G/DL (CALC) (ref 1.9–3.7)
GLUCOSE: 116 MG/DL (ref 65–99)
HDL CHOLESTEROL: 37 MG/DL
HEMATOCRIT: 45 % (ref 38.5–50)
HEMOGLOBIN A1C: 5.5 % OF TOTAL HGB
HEMOGLOBIN: 14.8 G/DL (ref 13.2–17.1)
LDL-CHOLESTEROL: 44 MG/DL (CALC)
LYMPHOCYTES: 18.2 %
MCH: 30.3 PG (ref 27–33)
MCHC: 32.9 G/DL (ref 32–36)
MCV: 92 FL (ref 80–100)
MONOCYTES: 10.3 %
MPV: 11.4 FL (ref 7.5–12.5)
NEUTROPHILS: 69.3 %
NON-HDL CHOLESTEROL: 60 MG/DL (CALC)
PLATELET COUNT: 186 THOUSAND/UL (ref 140–400)
POTASSIUM: 4 MMOL/L (ref 3.5–5.3)
PROTEIN, TOTAL: 6.6 G/DL (ref 6.1–8.1)
PSA, TOTAL: 0.44 NG/ML
RDW: 12.6 % (ref 11–15)
RED BLOOD CELL COUNT: 4.89 MILLION/UL (ref 4.2–5.8)
SODIUM: 138 MMOL/L (ref 135–146)
TRIGLYCERIDES: 78 MG/DL
TSH W/REFLEX TO FT4: 1.29 MIU/L (ref 0.4–4.5)
WHITE BLOOD CELL COUNT: 5.4 THOUSAND/UL (ref 3.8–10.8)

## 2023-02-27 ENCOUNTER — TELEPHONE (OUTPATIENT)
Dept: INTERNAL MEDICINE CLINIC | Facility: CLINIC | Age: 58
End: 2023-02-27

## 2023-02-27 NOTE — TELEPHONE ENCOUNTER
Spoke to spouse and patient. The dental office recommends Ibuprofen and Tylenol together every 6 hours. Patients is having a little anxiety about taking all these medications. Relayed that they are different medications and short-term.      They verbalized understanding and will take as prescribed by the dentist.

## 2023-02-27 NOTE — TELEPHONE ENCOUNTER
Patient spouse is calling to confirm if the patient can take all 3 medications together due to having tooth extracted.      Amoxicllin 500mg   Ibuprofen 600mg  Tylenol 500mg

## 2023-03-07 NOTE — TELEPHONE ENCOUNTER
From: Ophelia Pathak  To: Yanet Suarez MD  Sent: 9/22/2020 8:09 AM CDT  Subject: Non-Urgent Medical Question    Good morning,   There's 1 week ago I came for an office visit because my knee was swollen.  I took the medication that I was prescribed and Please call patient back about results  Patient with significant liver test elevation add acute hepatitis panel to the blood at the lab, patient drinks a fair amount of alcohol likely because work on cessation lets get ultrasound of the liver for further evaluation  Repeat hepatic function panel in 6 weeks  Also contact the lab LDL seems to have been entered incorrectly have them double check the result  the patient has mild prediabetes on bloodwork, have them work on diet/weight loss to improve this, work on avoiding simple carbohydrates (\"whites\"--white bread, white rice, white pasta, etc.. ) to improve further, no medications needed for now will monitor bloodwork

## 2023-03-23 ENCOUNTER — NURSE TRIAGE (OUTPATIENT)
Dept: INTERNAL MEDICINE CLINIC | Facility: CLINIC | Age: 58
End: 2023-03-23

## 2023-03-27 ENCOUNTER — PATIENT MESSAGE (OUTPATIENT)
Dept: INTERNAL MEDICINE CLINIC | Facility: CLINIC | Age: 58
End: 2023-03-27

## 2023-03-28 NOTE — TELEPHONE ENCOUNTER
From: Sandra Dillon  To: Lizette Garner MD  Sent: 3/27/2023 11:05 AM CDT  Subject: Ringing in the ears    Good morning, my ears has been ringing a lot since a few days and I feel my ears are block. I also feel congested. Is there any medication that I can take. Thanks

## 2023-04-26 ENCOUNTER — TELEPHONE (OUTPATIENT)
Dept: INTERNAL MEDICINE CLINIC | Facility: CLINIC | Age: 58
End: 2023-04-26

## 2023-04-26 NOTE — TELEPHONE ENCOUNTER
See [employer] messages  Pt called - taking xyzal /nose spray, head still feels full, ears ringing   Requesting further advice                 Good morning, my ears has been ringing a lot since a few days and I feel my ears are block. I also feel congested. Is there any other medication that I can take? I am currently taking allergy medication and nose spray for a few days.  Thanks

## 2023-04-26 NOTE — TELEPHONE ENCOUNTER
Patient can take over-the-counter medications including Zyrtec or other antihistamine, Coricidin HBP, and nasal spray such as Flonase or Nasacort. All of these are over-the-counter.

## 2023-06-13 ENCOUNTER — TELEPHONE (OUTPATIENT)
Facility: CLINIC | Age: 58
End: 2023-06-13

## 2023-06-13 NOTE — TELEPHONE ENCOUNTER
Dr. Miriam Grimaldo,    I spoke with pt. I tried to set up appt with you but your 1st available will be 8/18/23 res 24. Please advise when you can see him, please see below. Thank you. C/o: intermittent burping, lower abd'l pain, described as \"pressure\", denies pain at present, highest pain 4/10, bloating at times. Denies n/v, constipation, diarrhea, fever, chills, chest pain, shortness of breath    Normal BM daily, last BM last night, denies brbpr, melena, hematochezia    Onset: 3 months    Food Triggers: dairy products    Current Diet: low salt, low fat, low sugar diet    Had CABG 3/24/22    New Medications: No    Taking probiotics daily for a week now. Taking simethicone 2 tabs as needed    Last OV 2/12/18    Colonoscopy 11/18/21    Discussed patient education below:  -Avoid food triggers: dairy products, gas forming foods such as cabbage, beans, onion, garlic, cauliflower, broccoli. -Keep a food diary if needed to identify triggers and avoid them  -Avoid spicy/greasy/fatty/acidic/carbonated foods  -If symptoms get worse, please go to ER.

## 2023-06-15 NOTE — TELEPHONE ENCOUNTER
Patient contacted, name/ verified. Relayed message below from Dr. Jannette Millard. He said his symptoms are better. I made him aware to let us know if symptoms become severe so MD can order CT scan or he needs to go to ER. Pt already set up appt with Anisa Polanco on 23. Informed pt that he should only be seeing Dr. Jannette Millard. He said he will keep Ami's appt for now and will watch cancellations through [address] [place] [address] [address]. I informed him of Dr. Mazin Wen next availability which is in December. Patient verbalized understanding. All questions answered.

## 2023-06-15 NOTE — TELEPHONE ENCOUNTER
Reassuring colonoscopy examination 11/18/2021 and before that 7/3/2018. Unfortunately, this will need to wait for first available. Okay to put him on a wait list or ask him to watch MyChart for cancellations which occur every week.     If symptoms are severe, we could either order a CT scan or he would need to come into the ED.    - farhan

## 2023-07-14 ENCOUNTER — LAB ENCOUNTER (OUTPATIENT)
Dept: LAB | Facility: HOSPITAL | Age: 58
End: 2023-07-14
Attending: PHYSICIAN ASSISTANT
Payer: COMMERCIAL

## 2023-07-14 ENCOUNTER — OFFICE VISIT (OUTPATIENT)
Facility: CLINIC | Age: 58
End: 2023-07-14

## 2023-07-14 VITALS
BODY MASS INDEX: 20.47 KG/M2 | DIASTOLIC BLOOD PRESSURE: 82 MMHG | SYSTOLIC BLOOD PRESSURE: 130 MMHG | HEART RATE: 58 BPM | HEIGHT: 70 IN | WEIGHT: 143 LBS

## 2023-07-14 DIAGNOSIS — R14.3 FLATULENCE: ICD-10-CM

## 2023-07-14 DIAGNOSIS — R14.2 BELCHING: ICD-10-CM

## 2023-07-14 DIAGNOSIS — R14.2 BELCHING: Primary | ICD-10-CM

## 2023-07-14 PROCEDURE — 3008F BODY MASS INDEX DOCD: CPT | Performed by: PHYSICIAN ASSISTANT

## 2023-07-14 PROCEDURE — 99214 OFFICE O/P EST MOD 30 MIN: CPT | Performed by: PHYSICIAN ASSISTANT

## 2023-07-14 PROCEDURE — 3079F DIAST BP 80-89 MM HG: CPT | Performed by: PHYSICIAN ASSISTANT

## 2023-07-14 PROCEDURE — 83013 H PYLORI (C-13) BREATH: CPT

## 2023-07-14 PROCEDURE — 3075F SYST BP GE 130 - 139MM HG: CPT | Performed by: PHYSICIAN ASSISTANT

## 2023-07-14 NOTE — PATIENT INSTRUCTIONS
Recommendation:   - will test for h pylori today   - will contact with results  - if negative will start PPI treatment  - can continue with elimination of dairy

## 2023-07-15 LAB — H PYLORI BREATH TEST: NEGATIVE

## 2023-07-17 ENCOUNTER — TELEPHONE (OUTPATIENT)
Facility: CLINIC | Age: 58
End: 2023-07-17

## 2023-07-17 RX ORDER — PANTOPRAZOLE SODIUM 40 MG/1
40 TABLET, DELAYED RELEASE ORAL
Qty: 90 TABLET | Refills: 0 | Status: SHIPPED | OUTPATIENT
Start: 2023-07-17 | End: 2023-10-15

## 2023-07-17 NOTE — TELEPHONE ENCOUNTER
Patient contacted. I reviewed below complete message with the patient and he voiced understanding. He accepted below appointment to [facility] up with [facility] in office and given office directions.     Your Appointments        Tuesday September 12, 2023  7:30 AM  Follow Up Visit with Jagdeep Johnson PA-C  [address] [facility],[address], [facility]) [address] [place] [address],[address]  [phone]

## 2023-07-17 NOTE — TELEPHONE ENCOUNTER
----- Message from Aneesh Martin PA-C sent at 7/17/2023  7:36 AM CDT -----  Please inform patient of results. He is negative for h pylori. I want him to start pantoprazole 40 mg once a day and follow up in officein 2 months.      Thanks  [employer]

## 2023-08-04 ENCOUNTER — OFFICE VISIT (OUTPATIENT)
Dept: INTERNAL MEDICINE CLINIC | Facility: CLINIC | Age: 58
End: 2023-08-04

## 2023-08-04 VITALS
SYSTOLIC BLOOD PRESSURE: 124 MMHG | DIASTOLIC BLOOD PRESSURE: 80 MMHG | RESPIRATION RATE: 18 BRPM | HEART RATE: 62 BPM | WEIGHT: 144 LBS | BODY MASS INDEX: 20.62 KG/M2 | HEIGHT: 70 IN | TEMPERATURE: 98 F

## 2023-08-04 DIAGNOSIS — R73.09 ELEVATED GLUCOSE: ICD-10-CM

## 2023-08-04 DIAGNOSIS — E78.5 HYPERLIPIDEMIA, UNSPECIFIED HYPERLIPIDEMIA TYPE: ICD-10-CM

## 2023-08-04 DIAGNOSIS — H93.19 TINNITUS, UNSPECIFIED LATERALITY: ICD-10-CM

## 2023-08-04 DIAGNOSIS — F41.9 ANXIETY: ICD-10-CM

## 2023-08-04 DIAGNOSIS — I25.10 CORONARY ARTERY DISEASE INVOLVING NATIVE CORONARY ARTERY OF NATIVE HEART WITHOUT ANGINA PECTORIS: ICD-10-CM

## 2023-08-04 DIAGNOSIS — I10 ESSENTIAL HYPERTENSION: Primary | ICD-10-CM

## 2023-08-04 PROCEDURE — 3074F SYST BP LT 130 MM HG: CPT | Performed by: INTERNAL MEDICINE

## 2023-08-04 PROCEDURE — 99214 OFFICE O/P EST MOD 30 MIN: CPT | Performed by: INTERNAL MEDICINE

## 2023-08-04 PROCEDURE — 3008F BODY MASS INDEX DOCD: CPT | Performed by: INTERNAL MEDICINE

## 2023-08-04 PROCEDURE — 3079F DIAST BP 80-89 MM HG: CPT | Performed by: INTERNAL MEDICINE

## 2023-08-06 ENCOUNTER — LAB ENCOUNTER (OUTPATIENT)
Dept: LAB | Facility: HOSPITAL | Age: 58
End: 2023-08-06
Attending: INTERNAL MEDICINE
Payer: COMMERCIAL

## 2023-08-06 DIAGNOSIS — E78.5 HYPERLIPIDEMIA, UNSPECIFIED HYPERLIPIDEMIA TYPE: ICD-10-CM

## 2023-08-06 DIAGNOSIS — D64.89 ANEMIA DUE TO OTHER CAUSE, NOT CLASSIFIED: ICD-10-CM

## 2023-08-06 DIAGNOSIS — R73.09 ELEVATED GLUCOSE: ICD-10-CM

## 2023-08-06 LAB
ALBUMIN SERPL-MCNC: 3.6 G/DL (ref 3.4–5)
ALBUMIN/GLOB SERPL: 1.2 {RATIO} (ref 1–2)
ALP LIVER SERPL-CCNC: 61 U/L
ALT SERPL-CCNC: 40 U/L
ANION GAP SERPL CALC-SCNC: 1 MMOL/L (ref 0–18)
AST SERPL-CCNC: 28 U/L (ref 15–37)
BASOPHILS # BLD AUTO: 0.04 X10(3) UL (ref 0–0.2)
BASOPHILS NFR BLD AUTO: 0.9 %
BILIRUB SERPL-MCNC: 0.6 MG/DL (ref 0.1–2)
BUN BLD-MCNC: 24 MG/DL (ref 7–18)
BUN/CREAT SERPL: 20.5 (ref 10–20)
CALCIUM BLD-MCNC: 8.9 MG/DL (ref 8.5–10.1)
CHLORIDE SERPL-SCNC: 111 MMOL/L (ref 98–112)
CHOLEST SERPL-MCNC: 91 MG/DL (ref ?–200)
CO2 SERPL-SCNC: 29 MMOL/L (ref 21–32)
CREAT BLD-MCNC: 1.17 MG/DL
DEPRECATED RDW RBC AUTO: 44.4 FL (ref 35.1–46.3)
EGFRCR SERPLBLD CKD-EPI 2021: 74 ML/MIN/1.73M2 (ref 60–?)
EOSINOPHIL # BLD AUTO: 0.07 X10(3) UL (ref 0–0.7)
EOSINOPHIL NFR BLD AUTO: 1.6 %
ERYTHROCYTE [DISTWIDTH] IN BLOOD BY AUTOMATED COUNT: 13.3 % (ref 11–15)
EST. AVERAGE GLUCOSE BLD GHB EST-MCNC: 120 MG/DL (ref 68–126)
FASTING PATIENT LIPID ANSWER: YES
FASTING STATUS PATIENT QL REPORTED: YES
GLOBULIN PLAS-MCNC: 3.1 G/DL (ref 2.8–4.4)
GLUCOSE BLD-MCNC: 118 MG/DL (ref 70–99)
HBA1C MFR BLD: 5.8 % (ref ?–5.7)
HCT VFR BLD AUTO: 42.7 %
HDLC SERPL-MCNC: 37 MG/DL (ref 40–59)
HGB BLD-MCNC: 14 G/DL
IMM GRANULOCYTES # BLD AUTO: 0.01 X10(3) UL (ref 0–1)
IMM GRANULOCYTES NFR BLD: 0.2 %
LDLC SERPL CALC-MCNC: 39 MG/DL (ref ?–100)
LYMPHOCYTES # BLD AUTO: 1.08 X10(3) UL (ref 1–4)
LYMPHOCYTES NFR BLD AUTO: 24.6 %
MCH RBC QN AUTO: 29.9 PG (ref 26–34)
MCHC RBC AUTO-ENTMCNC: 32.8 G/DL (ref 31–37)
MCV RBC AUTO: 91 FL
MONOCYTES # BLD AUTO: 0.37 X10(3) UL (ref 0.1–1)
MONOCYTES NFR BLD AUTO: 8.4 %
NEUTROPHILS # BLD AUTO: 2.82 X10 (3) UL (ref 1.5–7.7)
NEUTROPHILS # BLD AUTO: 2.82 X10(3) UL (ref 1.5–7.7)
NEUTROPHILS NFR BLD AUTO: 64.3 %
NONHDLC SERPL-MCNC: 54 MG/DL (ref ?–130)
OSMOLALITY SERPL CALC.SUM OF ELEC: 297 MOSM/KG (ref 275–295)
PLATELET # BLD AUTO: 154 10(3)UL (ref 150–450)
POTASSIUM SERPL-SCNC: 4 MMOL/L (ref 3.5–5.1)
PROT SERPL-MCNC: 6.7 G/DL (ref 6.4–8.2)
RBC # BLD AUTO: 4.69 X10(6)UL
SODIUM SERPL-SCNC: 141 MMOL/L (ref 136–145)
TRIGL SERPL-MCNC: 67 MG/DL (ref 30–149)
VLDLC SERPL CALC-MCNC: 9 MG/DL (ref 0–30)
WBC # BLD AUTO: 4.4 X10(3) UL (ref 4–11)

## 2023-08-06 PROCEDURE — 80053 COMPREHEN METABOLIC PANEL: CPT

## 2023-08-06 PROCEDURE — 36415 COLL VENOUS BLD VENIPUNCTURE: CPT

## 2023-08-06 PROCEDURE — 80061 LIPID PANEL: CPT

## 2023-08-06 PROCEDURE — 85025 COMPLETE CBC W/AUTO DIFF WBC: CPT

## 2023-08-06 PROCEDURE — 83036 HEMOGLOBIN GLYCOSYLATED A1C: CPT

## 2023-08-26 ENCOUNTER — TELEPHONE (OUTPATIENT)
Dept: INTERNAL MEDICINE CLINIC | Facility: CLINIC | Age: 58
End: 2023-08-26

## 2023-08-26 DIAGNOSIS — M26.609 TMJ DISEASE: Primary | ICD-10-CM

## 2023-08-30 ENCOUNTER — OFFICE VISIT (OUTPATIENT)
Dept: OTOLARYNGOLOGY | Facility: CLINIC | Age: 58
End: 2023-08-30

## 2023-08-30 ENCOUNTER — OFFICE VISIT (OUTPATIENT)
Dept: AUDIOLOGY | Facility: CLINIC | Age: 58
End: 2023-08-30

## 2023-08-30 VITALS — WEIGHT: 140 LBS | BODY MASS INDEX: 20 KG/M2

## 2023-08-30 DIAGNOSIS — H90.3 SENSORINEURAL HEARING LOSS (SNHL), BILATERAL: ICD-10-CM

## 2023-08-30 DIAGNOSIS — H93.13 TINNITUS OF BOTH EARS: Primary | ICD-10-CM

## 2023-08-30 DIAGNOSIS — H90.3 SENSORINEURAL HEARING LOSS, BILATERAL: Primary | ICD-10-CM

## 2023-08-30 PROCEDURE — 92557 COMPREHENSIVE HEARING TEST: CPT | Performed by: AUDIOLOGIST

## 2023-08-30 PROCEDURE — 92567 TYMPANOMETRY: CPT | Performed by: AUDIOLOGIST

## 2023-09-12 ENCOUNTER — OFFICE VISIT (OUTPATIENT)
Facility: CLINIC | Age: 58
End: 2023-09-12

## 2023-09-12 VITALS — DIASTOLIC BLOOD PRESSURE: 83 MMHG | HEART RATE: 63 BPM | SYSTOLIC BLOOD PRESSURE: 126 MMHG

## 2023-09-12 DIAGNOSIS — R14.0 BLOATING: Primary | ICD-10-CM

## 2023-09-12 DIAGNOSIS — R14.3 FLATULENCE: ICD-10-CM

## 2023-09-12 PROCEDURE — 3074F SYST BP LT 130 MM HG: CPT | Performed by: PHYSICIAN ASSISTANT

## 2023-09-12 PROCEDURE — 3079F DIAST BP 80-89 MM HG: CPT | Performed by: PHYSICIAN ASSISTANT

## 2023-09-12 PROCEDURE — 99213 OFFICE O/P EST LOW 20 MIN: CPT | Performed by: PHYSICIAN ASSISTANT

## 2023-09-18 RX ORDER — PANTOPRAZOLE SODIUM 40 MG/1
40 TABLET, DELAYED RELEASE ORAL
Qty: 90 TABLET | Refills: 0 | Status: SHIPPED | OUTPATIENT
Start: 2023-09-18

## 2023-09-19 ENCOUNTER — HOSPITAL ENCOUNTER (OUTPATIENT)
Dept: NUTRITION | Facility: HOSPITAL | Age: 58
Discharge: HOME | End: 2023-09-19
Attending: INTERNAL MEDICINE
Payer: COMMERCIAL

## 2023-09-19 DIAGNOSIS — R73.03 PRE-DIABETES: Primary | ICD-10-CM

## 2023-09-19 PROCEDURE — 97802 MEDICAL NUTRITION INDIV IN: CPT | Performed by: DIETITIAN, REGISTERED

## 2023-09-19 NOTE — PROGRESS NOTES
Nutrition Assessment    Riley Solo is a 66year old male. Referred by: Attending  Referring Physician Name: Jordin Nguyen Nutrition Therapy Comment: Pre-diabetes  Visit Information: Initial Visit 9/19/23  60 minutes spent with patient    ANTHROPOMETRICS  HT: 5' 10\"  WT: 140 pounds  IBW: 75 kg/166 lbs  84% IBW  BMI: 20  BMI CLASSIFICATION: 19-24.9 kg/m2 - WNL      SIGNIFICANT MEDICAL Hx  Significant Past Medical Hx: CAD, HTN Hyperlipidemia  Pertinent Medications:   Current Outpatient Medications:     pantoprazole 40 MG Oral Tab EC, Take 1 tablet (40 mg total) by mouth before breakfast., Disp: 90 tablet, Rfl: 0    finasteride 1 MG Oral Tab, Take 1 tablet (1 mg total) by mouth daily. , Disp: 30 tablet, Rfl: 5    carvedilol 3.125 MG Oral Tab, Take 1 tablet (3.125 mg) in the morning and 2 tablets (total of 6.25 mg) in the evening, Disp: 180 tablet, Rfl:     Fluocin-Hydroquinone-Tretinoin (TRI-LAITH) 0.01-4-0.05 % External Cream, Use qAM as directed to spots, Disp: 30 g, Rfl: 6    Tazarotene 0.1 % External Cream, Use at bedtime as directed, Disp: 60 g, Rfl: 6    saccharomyces boulardii (FLORASTOR) 250 MG Oral Cap, Take 1 capsule (250 mg total) by mouth 2 (two) times daily. , Disp: 60 capsule, Rfl: 0    rosuvastatin 40 MG Oral Tab, Take 1 tablet (40 mg total) by mouth nightly., Disp: 30 tablet, Rfl: 3    Multiple Vitamin Oral Tab, Take 1 tablet by mouth., Disp: , Rfl:     aspirin 81 MG Oral Tab EC, Take 1 tablet (81 mg total) by mouth daily. , Disp: , Rfl:   Pertinent Lab Results: 8/6/23: Hemoglobin A1C 5.8%, Cholesterol 91, HDL 37 (low), TG 67, LDL 39, fasting glucose 118 (high)    DIET HISTORY  Number of meals per day: 3  Number of snacks per day: 1  Number of meals away from home (per week):  Does not regularly eat out  Comment: Patient and his wife met with dietitian to discuss a heart healthy diet and make recommendation for improving diet to promote better blood glucose control due to recent elevation in hemoglobin A1C that puts this patient in the per-diabetic range. He has a history of heart disease with bypass grafting in March 2022. His has concerns about future issues with his heart health and wants to be certain he is adjusting his diet and lifestyle to promote the best outcome. His wife make all their meals from scratch at home. She does not at salt when cooking, using other spices instead. They are limiting red meat intake to less then 8 ounces in a month. Patient likes a variety of whole grains and has been using more keto breads and protein bars due to his concern about his blood sugars being elevated. Some issues with gas and bloating noted with certain foods. He is active most days, walking or jogging. Patient has been tracking his food in the [employer] and average intake is about 6462-3204 calories. He is often hungry in the evening, but is concerned about eating more food and how it will impact his health. Weight is 140, BMI is 20. PHYSICAL ACTIVITY  Type: walk, treadmill, and jog  Duration: 30-60 minutes  Frequency: per day  Physical Assessment: Meets goals for heart health    Nutrition Diagnosis:  Elevated nutrition related lab value due to excess intake as evidenced by hemoglobin A1C of 5.8%    Intervention     Nutrition Education: Recommended Modification  Nutrition/Diet Handouts Given: Heart Health Fiber tips, Reading food labels, Carbohydrate counting, Carbohydrate portion list      NUTRITION PRESCRIPTION:      Needs:  3880-5080 Calorie     100 gm Protein      Oral Diet Prescription: Balanced CHO 1800 Calorie      Goals     Nutrition Goals:   Balanced carbohydrates at meals, have about 45-60 grams at breakfast, lunch and dinner. Have 15 grams or less carbohydrate for snacks. 3. Dietary fiber goal is 25-30 grams per day. Have 5 servings of fruits and vegetables and add more whole grains. Increase soluble to 5-10 grams per day  Continue to be active daily.  Add a walk after dinner to promote improvement in BGL control. Limit saturated and trans fat to less then 12 grams per day. Total fat goal is about 45-50 grams per day. Recommendation to MD: none at this time    Assessment of Ability/Barriers     Patient and/or Family Will: Verbalize Understanding    Patient and/or Family Ability to Learn: Retain Information    Readiness to Learn:  Motivated    Barriers to Learning: None      9/19/2023  Beny Eduardo RD

## 2023-09-26 ENCOUNTER — OFFICE VISIT (OUTPATIENT)
Dept: PODIATRY CLINIC | Facility: CLINIC | Age: 58
End: 2023-09-26

## 2023-09-26 DIAGNOSIS — B35.1 ONYCHOMYCOSIS: Primary | ICD-10-CM

## 2023-09-26 PROCEDURE — 99213 OFFICE O/P EST LOW 20 MIN: CPT | Performed by: PODIATRIST

## 2023-09-26 NOTE — PROGRESS NOTES
[facility], [facility] Podiatry  Progress Note    Liberty Doe is a 66year old male. Patient presents with:  Toenail Fungus: Left big toe f/u - was seen in 2/2022 - states the Tx was helping only a little bit         HPI:     This is a pleasant male with PMH of CAD on plavix. He presents to clinic today due to left great toenail fungus f/u. He has been using the ketoconazole cream daily. He has noticed mild improvement. Allergies: Metoprolol and Seasonal   Current Outpatient Medications   Medication Sig Dispense Refill    pantoprazole 40 MG Oral Tab EC Take 1 tablet (40 mg total) by mouth before breakfast. 90 tablet 0    finasteride 1 MG Oral Tab Take 1 tablet (1 mg total) by mouth daily. 30 tablet 5    carvedilol 3.125 MG Oral Tab Take 1 tablet (3.125 mg) in the morning and 2 tablets (total of 6.25 mg) in the evening 180 tablet     Fluocin-Hydroquinone-Tretinoin (TRI-LAITH) 0.01-4-0.05 % External Cream Use qAM as directed to spots 30 g 6    Tazarotene 0.1 % External Cream Use at bedtime as directed 60 g 6    saccharomyces boulardii (FLORASTOR) 250 MG Oral Cap Take 1 capsule (250 mg total) by mouth 2 (two) times daily. 60 capsule 0    rosuvastatin 40 MG Oral Tab Take 1 tablet (40 mg total) by mouth nightly. 30 tablet 3    Multiple Vitamin Oral Tab Take 1 tablet by mouth. aspirin 81 MG Oral Tab EC Take 1 tablet (81 mg total) by mouth daily.         Past Medical History:   Diagnosis Date    Atherosclerosis of coronary artery 06/26/2015    Stenting of Left Circumflex and Ramus    Coronary atherosclerosis     Essential hypertension 12/2006    Heart disease     High blood pressure     High cholesterol     Hyperlipidemia       Past Surgical History:   Procedure Laterality Date    ANGIOPLASTY (CORONARY)  8/2015    CABG      x 4    CATH DRUG ELUTING STENT  06/02/2015    COLONOSCOPY N/A 07/03/2018    Procedure: COLONOSCOPY;  Surgeon: Rosetta Teixeira MD;  Location: [address] [place] [address] ENDOSCOPY    INCISION OF PYLORIC MUSCLE  1967    SKIN SURGERY Left 12/10/2020    epidermal inclusion cyst, lower back      Family History   Problem Relation Age of Onset    Heart Disease Father         premature CAD (cause of death)    Lipids Mother         hyperlipidemia    Stroke Paternal Grandmother         CVA    Diabetes Other         family h/o    Stroke Other         family h/o CVA    Stroke Cousin         CVA    Glaucoma Neg     Macular degeneration Neg       Social History    Socioeconomic History      Marital status:       Number of children: 2    Occupational History      Occupation: DATA  ANALYST        Employer: [employer]    Tobacco Use      Smoking status: Never      Smokeless tobacco: Never    Vaping Use      Vaping Use: Never used    Substance and Sexual Activity      Alcohol use: No      Drug use: No      Sexual activity: Yes        Partners: Female    Other Topics      Concerns:        Caffeine Concern: Yes          coffee,1 cup        Reaction to local anesthetic: No          REVIEW OF SYSTEMS:   Denies nausea, fever, chills  No calf pain  No other muscle or joint aches  Denies chest pain or shortness of breath. EXAM:   There were no vitals taken for this visit. Constitutional:   Patient in no apparent distress. Well kept Of normal body habitus. Alert and oriented to person, place, and time. Integumentary examination:   There are no varicosities. Skin appears moist, warm, and supple with positive hair growth. Left hallux nail is thickened discolored with subungual debris, mild clearing noticed at nail base  Vascular examination:   DP pulse is 2/4  PT pulse is 2/4  Capillary refill is delayed slightly  Neurological examination:   Vibratory (128-Hz tuning fork) sensation is present to right and is present to left. Sharp/dull is present to right and is present to left. Musculoskeletal examination:  Muscle Strength is 5/5.   Gait appears normal.      LABS & IMAGING:     Lab Results   Component Value Date  (H) 08/06/2023    BUN 24 (H) 08/06/2023    CREATSERUM 1.17 08/06/2023    BUNCREA 20.5 (H) 08/06/2023    ANIONGAP 1 08/06/2023    GFRAA 124 03/28/2022    GFRNAA 107 03/28/2022    CA 8.9 08/06/2023     08/06/2023    K 4.0 08/06/2023     08/06/2023    CO2 29.0 08/06/2023    OSMOCALC 297 (H) 08/06/2023        Lab Results   Component Value Date     08/06/2023    A1C 5.8 (H) 08/06/2023        No results found. ASSESSMENT AND PLAN:   Diagnoses and all orders for this visit:    Onychomycosis          Plan:     Reviewed treatment options for nail dystrophy / onychomycosis including:   No treatment and monitoring, topical meds, oral meds, nail removal   Advantages and disadvantages of each reviewed. Using oral agents would require regular blood test monitoring due to risk of hepatotoxicity and other adverse reactions including drug interactions. Topical meds are much safer yet carry very low efficacy. Pt would like to hold off on oral lamisil       Cont ketoconazole cream, pt to apply to toenail twice daily     RTC 6 months. If no improvement will consider nail biopsy. No follow-ups on file.     Eufemia Kat DPM  9/26/23

## 2023-10-04 ENCOUNTER — PATIENT MESSAGE (OUTPATIENT)
Dept: DERMATOLOGY CLINIC | Facility: CLINIC | Age: 58
End: 2023-10-04

## 2023-10-04 ENCOUNTER — OFFICE VISIT (OUTPATIENT)
Dept: DERMATOLOGY CLINIC | Facility: CLINIC | Age: 58
End: 2023-10-04

## 2023-10-04 DIAGNOSIS — L81.4 LENTIGO: ICD-10-CM

## 2023-10-04 DIAGNOSIS — L81.9 DYSCHROMIA: ICD-10-CM

## 2023-10-04 DIAGNOSIS — L81.6 POIKILODERMA: ICD-10-CM

## 2023-10-04 DIAGNOSIS — L65.9 HAIR LOSS: ICD-10-CM

## 2023-10-04 DIAGNOSIS — L30.9 DERMATITIS: Primary | ICD-10-CM

## 2023-10-04 DIAGNOSIS — L81.9 HYPERPIGMENTATION: ICD-10-CM

## 2023-10-04 PROCEDURE — 99214 OFFICE O/P EST MOD 30 MIN: CPT | Performed by: DERMATOLOGY

## 2023-10-04 RX ORDER — TAZAROTENE 1 MG/G
CREAM TOPICAL
Qty: 60 G | Refills: 6 | Status: SHIPPED | OUTPATIENT
Start: 2023-10-04 | End: 2023-10-10

## 2023-10-04 RX ORDER — TRIAMCINOLONE ACETONIDE 1 MG/G
1 CREAM TOPICAL 2 TIMES DAILY
Qty: 80 G | Refills: 3 | Status: SHIPPED | OUTPATIENT
Start: 2023-10-04

## 2023-10-04 RX ORDER — TAZAROTENE 1 MG/G
CREAM TOPICAL
Qty: 60 G | Refills: 6 | Status: CANCELLED | OUTPATIENT
Start: 2023-10-04

## 2023-10-04 RX ORDER — FLUOCINOLONE ACETONIDE, HYDROQUINONE, AND TRETINOIN .1; 40; .5 MG/G; MG/G; MG/G
CREAM TOPICAL
Qty: 30 G | Refills: 5 | Status: SHIPPED | OUTPATIENT
Start: 2023-10-04

## 2023-10-06 ENCOUNTER — TELEPHONE (OUTPATIENT)
Dept: DERMATOLOGY CLINIC | Facility: CLINIC | Age: 58
End: 2023-10-06

## 2023-10-06 NOTE — TELEPHONE ENCOUNTER
Spoke to patient and reiterated topical schedule with patient.  [facility]

## 2023-10-09 NOTE — TELEPHONE ENCOUNTER
Fax from [facility] for [facility] 0.1% cream    Placed fax in pa inbox

## 2023-10-09 NOTE — TELEPHONE ENCOUNTER
From: Sherice Salazar  To: Dave Sun DPM  Sent: 2/12/2020 6:38 AM CST  Subject: Non-Urgent Medical Question    Hi,  I ask my primary doctor for advice about the medication for toe nail (lamisil) and he recommended me not to take it.  The issue is This refill was requested by [facility].  I don't see that we have records from her [facility] admission, to reference any medication changes that were recommended.

## 2023-10-10 ENCOUNTER — APPOINTMENT (OUTPATIENT)
Dept: URBAN - METROPOLITAN AREA CLINIC 244 | Age: 58
Setting detail: DERMATOLOGY
End: 2023-10-11

## 2023-10-10 DIAGNOSIS — L64.8 OTHER ANDROGENIC ALOPECIA: ICD-10-CM

## 2023-10-10 PROCEDURE — OTHER ADDITIONAL NOTES: OTHER

## 2023-10-10 PROCEDURE — OTHER MIPS QUALITY: OTHER

## 2023-10-10 PROCEDURE — OTHER COUNSELING: OTHER

## 2023-10-10 PROCEDURE — 99203 OFFICE O/P NEW LOW 30 MIN: CPT

## 2023-10-10 RX ORDER — TAZAROTENE 1 MG/G
CREAM TOPICAL
Qty: 30 G | Refills: 3 | Status: SHIPPED | OUTPATIENT
Start: 2023-10-10

## 2023-10-10 ASSESSMENT — LOCATION SIMPLE DESCRIPTION DERM: LOCATION SIMPLE: POSTERIOR SCALP

## 2023-10-10 ASSESSMENT — LOCATION DETAILED DESCRIPTION DERM: LOCATION DETAILED: POSTERIOR MID-PARIETAL SCALP

## 2023-10-10 ASSESSMENT — LOCATION ZONE DERM: LOCATION ZONE: SCALP

## 2023-10-10 NOTE — HPI: HAIR LOSS
How Did The Hair Loss Occur?: gradual in onset
How Severe Is Your Hair Loss?: mild
Additional History: Patient recommended by different dermatologist to try Biotin and Nioxin shampoo.

## 2023-10-10 NOTE — PROCEDURE: ADDITIONAL NOTES
Render Risk Assessment In Note?: no
Additional Notes: Patient not interested in any oral medications. Will possibly consider oral minoxidil after addressing potential side effects. \\n\\nPatient noted hx of red irritated reaction to topical minoxidil but will possibly try again.
Detail Level: Simple

## 2023-10-16 NOTE — PROGRESS NOTES
Ena Mckeon is a 66year old male. HPI:     CC:  Patient presents with:  Derm Problem: LOV 11/7/22; he states is following up today for dark spots on both sides of face; he used the Tazorac in the past but he is presently out.      He wants recommendations for topical to use on scar on chest   Alopecia: He notes bald patches on his scalp, his PCP prescribed a spray for this but it caused redness and irritation         Allergies:  Metoprolol and Seasonal    HISTORY:    Past Medical History:   Diagnosis Date    Atherosclerosis of coronary artery 06/26/2015    Stenting of Left Circumflex and Ramus    Coronary atherosclerosis     Essential hypertension 12/2006    Heart disease     High blood pressure     High cholesterol     Hyperlipidemia       Past Surgical History:   Procedure Laterality Date    ANGIOPLASTY (CORONARY)  8/2015    CABG      x 4    CATH DRUG ELUTING STENT  06/02/2015    COLONOSCOPY N/A 07/03/2018    Procedure: COLONOSCOPY;  Surgeon: Summer Whitney MD;  Location: [address] [place] [address] ENDOSCOPY    INCISION OF [address] [place] [address]    SKIN SURGERY Left 12/10/2020    epidermal inclusion cyst, lower back      Family History   Problem Relation Age of Onset    Heart Disease Father         premature CAD (cause of death)    Lipids Mother         hyperlipidemia    Stroke Paternal Grandmother         CVA    Diabetes Other         family h/o    Stroke Other         family h/o CVA    Stroke Cousin         CVA    Glaucoma Neg     Macular degeneration Neg       Social History     Socioeconomic History    Marital status:     Number of children: 2   Occupational History    Occupation: DATA  ANALYST     Employer: [employer]   Tobacco Use    Smoking status: Never    Smokeless tobacco: Never   Vaping Use    Vaping Use: Never used   Substance and Sexual Activity    Alcohol use: No    Drug use: No    Sexual activity: Yes     Partners: Female   Other Topics Concern    Caffeine Concern Yes     Comment: coffee,1 cup    Reaction to local anesthetic No    Pt has a pacemaker No    Pt has a defibrillator No        Current Outpatient Medications   Medication Sig Dispense Refill    Fluocin-Hydroquinone-Tretinoin (TRI-LAITH) 0.01-4-0.05 % External Cream Use qohs as directed 30 g 5    triamcinolone 0.1 % External Cream Apply 1 Application topically 2 (two) times daily. To areas of itching/ eczema 80 g 3    pantoprazole 40 MG Oral Tab EC Take 1 tablet (40 mg total) by mouth before breakfast. 90 tablet 0    carvedilol 3.125 MG Oral Tab Take 1 tablet (3.125 mg) in the morning and 2 tablets (total of 6.25 mg) in the evening 180 tablet     saccharomyces boulardii (FLORASTOR) 250 MG Oral Cap Take 1 capsule (250 mg total) by mouth 2 (two) times daily. 60 capsule 0    rosuvastatin 40 MG Oral Tab Take 1 tablet (40 mg total) by mouth nightly. 30 tablet 3    Multiple Vitamin Oral Tab Take 1 tablet by mouth. aspirin 81 MG Oral Tab EC Take 1 tablet (81 mg total) by mouth daily.       Tazarotene 0.1 % External Cream USE AT BEDTIME AS DIRECTED 30 g 3     Allergies:     Metoprolol              OTHER (SEE COMMENTS)  Seasonal                OTHER (SEE COMMENTS)    Comment:Seasonal spring    Past Medical History:   Diagnosis Date    Atherosclerosis of coronary artery 06/26/2015    Stenting of Left Circumflex and Ramus    Coronary atherosclerosis     Essential hypertension 12/2006    Heart disease     High blood pressure     High cholesterol     Hyperlipidemia      Past Surgical History:   Procedure Laterality Date    ANGIOPLASTY (CORONARY)  8/2015    CABG      x 4    CATH DRUG ELUTING STENT  06/02/2015    COLONOSCOPY N/A 07/03/2018    Procedure: COLONOSCOPY;  Surgeon: Stacie Rodriguez MD;  Location: [address] [place] [address] ENDOSCOPY    INCISION OF PYLORIC MUSCLE  1967    SKIN SURGERY Left 12/10/2020    epidermal inclusion cyst, lower back     Social History    Socioeconomic History      Marital status:       Spouse name: Not on file      Number of children: 2 Years of education: Not on file      Highest education level: Not on file    Occupational History      Occupation: DATA  ANALYST        Employer: [employer]    Tobacco Use      Smoking status: Never      Smokeless tobacco: Never    Vaping Use      Vaping Use: Never used    Substance and Sexual Activity      Alcohol use: No      Drug use: No      Sexual activity: Yes        Partners: Female    Other Topics      Concerns:         Service: Not Asked        Blood Transfusions: Not Asked        Caffeine Concern: Yes          coffee,1 cup        Occupational Exposure: Not Asked        Hobby Hazards: Not Asked        Sleep Concern: Not Asked        Stress Concern: Not Asked        Weight Concern: Not Asked        Special Diet: Not Asked        Back Care: Not Asked        Exercise: Not Asked        Bike Helmet: Not Asked        Seat Belt: Not Asked        Self-Exams: Not Asked        Grew up on a farm: Not Asked        History of tanning: Not Asked        Outdoor occupation: Not Asked        Reaction to local anesthetic: No        Pt has a pacemaker: No        Pt has a defibrillator: No    Social History Narrative      Not on file    Social Determinants of Health  Financial Resource Strain: Not on file  Food Insecurity: Not on file  Transportation Needs: Not on file  Physical Activity: Not on file  Stress: Not on file  Social Connections: Not on file  Housing Stability: Not on file  Family History   Problem Relation Age of Onset    Heart Disease Father         premature CAD (cause of death)    Lipids Mother         hyperlipidemia    Stroke Paternal Grandmother         CVA    Diabetes Other         family h/o    Stroke Other         family h/o CVA    Stroke Cousin         CVA    Glaucoma Neg     Macular degeneration Neg        There were no vitals filed for this visit.     HPI:  Patient presents with:  Derm Problem: LOV 11/7/22; he states is following up today for dark spots on both sides of face; he used the Tazorac in the past but he is presently out. He wants recommendations for topical to use on scar on chest   Alopecia: He notes bald patches on his scalp, his PCP prescribed a spray for this but it caused redness and irritation         Follow-up pigmentation changes. 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 Eczematous lesions have resolved apparently. No other concerns  Patient presents with concerns above. New concern hair loss, concern with scar central chest had sternotomy-CABG doing well has noted bumpy area at the bottom  Eczematous patches come and go again    Patient presents with concerns above. Patient has been in their usual state of health. Past notes/ records and appropriate/relevant lab results including pathology and past body maps reviewed. Including outside notes/ PCP notes as appropriate. Updated and new information noted in current visit. ROS:  Denies other relevant systemic complaints. History, medications, allergies reviewed as noted. Physical Examination:     Well-developed well-nourished patient alert oriented in no acute distress. Exam performed, including scalp, head, neck, face,nails, hair, external eyes, including conjunctival mucosa, eyelids, lips external ears , arms, digits,palms. Multiple light to medium brown, well marginated, uniformly pigmented, macules and papules 6 mm and less scattered on exam. pigmented lesions examined with dermoscopy benign-appearing patterns. Waxy tannish keratotic papules scattered, cherry-red vascular papules scattered. See map today's date for lesions noted . See assessment and plan below for specific lesions. Otherwise remarkable for lesions as noted on map.     See A/P  below for additional information:    Assessment / plan:    No orders of the defined types were placed in this encounter. Meds & Refills for this Visit:  Requested Prescriptions     Signed Prescriptions Disp Refills    Fluocin-Hydroquinone-Tretinoin (TRI-LAITH) 0.01-4-0.05 % External Cream 30 g 5     Sig: Use qohs as directed    triamcinolone 0.1 % External Cream 80 g 3     Sig: Apply 1 Application topically 2 (two) times daily. To areas of itching/ eczema         Dermatitis  (primary encounter diagnosis)  Lentigo  Dyschromia  Poikiloderma  Hyperpigmentation  Hair loss    Hyperpigmentation over the face continue careful sun protection the morning survey is fine may continue alternating nights with Tazorac and Tri-Laith. Use 1 1 night on the next night. Does not need to use both of them daily at this point in time significant improvement more for chronic maintenance. Hair loss pattern likely the vertex. Patient with history of allergy to minoxidil solution, Neutra full review of the scalp or biotin may be helpful Naxen shampoo may be helpful avoid components that contain Rogaine/minoxidil  Overall consistent with pattern loss continue supportive care    Scar from sternotomy slightly hypertrophic at inferior portion silicone gel is over-the-counter may be helpful    Eczematous patches on the right hip presently triamcinolone as needed    No other susupicious lesions on todays  exam.      Please refer to map for specific lesions. See additional diagnoses. Pros cons of various therapies, risks benefits discussed. Pathophysiology discussed with patient. Therapeutic options reviewed. See  Medications in grid. Instructions reviewed at length. Benign nevi, seborrheic  keratoses, cherry angiomas:  Reassurance regarding other benign skin lesions. Signs and symptoms of skin cancer, ABCDE's of melanoma discussed with patient. Sunscreen use, sun protection, self exams reviewed. Followup as noted RTC ---routine checkup    6 mos -one year or p.r.n.     Encounter Times   Including precharting, reviewing chart, prior notes obtaining history: 10 minutes, medical exam :10 minutes, notes on body map, plan, counseling 10minutes My total time spent caring for the patient on the day of the encounter: 30 minutes     The patient indicates understanding of these issues and agrees to the plan. The patient is asked to return as noted in follow-up/ above. This note was generated using Dragon voice recognition software. Please contact me regarding any confusion resulting from errors in recognition. .  Note to patient and family: The [facility] makes medical notes like these available to patients. However, be advised this is a medical document. It is intended as [id number] communication and monitoring of a patient's care needs. It is written in medical language and may contain abbreviations or verbiage that are unfamiliar. It may appear blunt or direct. Medical documents are intended to carry relevant information, facts as evident and the clinical opinion of the practitioner.

## 2023-11-12 ENCOUNTER — IMMUNIZATION (OUTPATIENT)
Dept: LAB | Age: 58
End: 2023-11-12
Attending: EMERGENCY MEDICINE
Payer: COMMERCIAL

## 2023-11-12 DIAGNOSIS — Z23 NEED FOR VACCINATION: Primary | ICD-10-CM

## 2023-11-12 PROCEDURE — 90480 ADMN SARSCOV2 VAC 1/ONLY CMP: CPT

## 2023-12-01 RX ORDER — PANTOPRAZOLE SODIUM 40 MG/1
40 TABLET, DELAYED RELEASE ORAL
Qty: 90 TABLET | Refills: 0 | Status: SHIPPED | OUTPATIENT
Start: 2023-12-01

## 2023-12-01 NOTE — TELEPHONE ENCOUNTER
Requested Prescriptions     Pending Prescriptions Disp Refills    PANTOPRAZOLE 40 MG Oral Tab EC [Pharmacy Med Name: PANTOPRAZOLE 40MG TABLETS] 90 tablet 0     Sig: TAKE 1 TABLET(40 MG) BY MOUTH BEFORE BREAKFAST         LOV   9/12/2023    LR  9/18/2023    sb

## 2023-12-04 ENCOUNTER — PATIENT MESSAGE (OUTPATIENT)
Facility: CLINIC | Age: 58
End: 2023-12-04

## 2023-12-04 NOTE — TELEPHONE ENCOUNTER
Per chart review, 90 day Pantoprazole was sent to pt's pharmacy.      Called both numbers on file, left message to call back

## 2023-12-04 NOTE — TELEPHONE ENCOUNTER
From: Natalia Rabago  To: Mariana Eng  Sent: 12/4/2023 10:48 AM CST  Subject: Medication    Hi Leonarda Jose Carlos,  I am running out of the Pantropazole medication that you prescribed for 90 days. Please let me know if I do need to continue to take the medication after this. Thank you.

## 2023-12-05 ENCOUNTER — TELEPHONE (OUTPATIENT)
Dept: NUTRITION | Facility: HOSPITAL | Age: 58
End: 2023-12-05

## 2023-12-05 NOTE — TELEPHONE ENCOUNTER
Patient contacted and advised a 90 supply was sent to [facility] on 12/01/2023 by Minna Rodriguez. Patient voiced understanding.

## 2024-01-02 ENCOUNTER — TELEMEDICINE (OUTPATIENT)
Dept: INTERNAL MEDICINE CLINIC | Facility: CLINIC | Age: 59
End: 2024-01-02
Payer: COMMERCIAL

## 2024-01-02 ENCOUNTER — NURSE TRIAGE (OUTPATIENT)
Dept: INTERNAL MEDICINE CLINIC | Facility: CLINIC | Age: 59
End: 2024-01-02

## 2024-01-02 DIAGNOSIS — J06.9 URI, ACUTE: Primary | ICD-10-CM

## 2024-01-02 PROCEDURE — 99213 OFFICE O/P EST LOW 20 MIN: CPT | Performed by: INTERNAL MEDICINE

## 2024-01-02 RX ORDER — MONTELUKAST SODIUM 10 MG/1
10 TABLET ORAL DAILY
Qty: 30 TABLET | Refills: 0 | Status: SHIPPED | OUTPATIENT
Start: 2024-01-02 | End: 2024-12-27

## 2024-01-02 NOTE — TELEPHONE ENCOUNTER
Action Requested: Summary for Provider     []  Critical Lab, Recommendations Needed  [] Need Additional Advice  []   FYI    []   Need Orders  [] Need Medications Sent to Pharmacy  []  Other     SUMMARY: Patient reports sneezing and nasal congestion x 3 days. Tried Neti Pot.  Can't sleep at night.  Slight cough.  Some body aches.  Did not take a home Covid test.  Denies fever, ear pain, or sore throat.  Wants to do video visit, has history of open heart surgery and is concerned about what he can take for symptoms.  Scheduled video visit:  Future Appointments   Date Time Provider Department Center   2024 10:45 AM Avelina Rosales MD [facility] [place]   2024  9:00 AM Anabela Freeman LCSW [facility] [place]   2/10/2024 10:00 AM Jose Antonio Purcell MD [facility] [place]   3/26/2024  5:40 PM Marcio Crane, REJIM [facility] [place]     Reason for call: Sneezing and nasal congestion  Onset: 3 days  Spoke with patient,  verified.     Reason for Disposition   Patient wants to be seen    Protocols used: Sinus Pain or Congestion-A-OH

## 2024-01-02 NOTE — PROGRESS NOTES
This is a telemedicine visit with live, interactive video and audio.     Patient understands and accepts financial responsibility for any deductible, co-insurance and/or co-pays associated with this service.    SUBJECTIVE  He scheduled video visit today due to sneezing and congestion.  According to him his symptoms are ongoing for 3 days.  He has hard time sleeping due to sneezing and cough.  He denies any fever or chills.  No shortness of breath.  He did not do any COVID test at home.    HISTORY:  Past Medical History:   Diagnosis Date    Atherosclerosis of coronary artery 06/26/2015    Stenting of Left Circumflex and Ramus    Coronary atherosclerosis     Essential hypertension 12/2006    Heart disease     High blood pressure     High cholesterol     Hyperlipidemia       Past Surgical History:   Procedure Laterality Date    ANGIOPLASTY (CORONARY)  8/2015    CABG      x 4    CATH DRUG ELUTING STENT  06/02/2015    COLONOSCOPY N/A 07/03/2018    Procedure: COLONOSCOPY;  Surgeon: Skinny Shi MD;  Location: [facility] ENDOSCOPY    INCISION OF PYLORIC MUSCLE  1967    SKIN SURGERY Left 12/10/2020    epidermal inclusion cyst, lower back      Family History   Problem Relation Age of Onset    Heart Disease Father         premature CAD (cause of death)    Lipids Mother         hyperlipidemia    Stroke Paternal Grandmother         CVA    Diabetes Other         family h/o    Stroke Other         family h/o CVA    Stroke Cousin         CVA    Glaucoma Neg     Macular degeneration Neg       Social History     Socioeconomic History    Marital status:     Number of children: 2   Occupational History    Occupation: DATA  ANALYST     Employer: [employer]   Tobacco Use    Smoking status: Never    Smokeless tobacco: Never   Vaping Use    Vaping Use: Never used   Substance and Sexual Activity    Alcohol use: No    Drug use: No    Sexual activity: Yes     Partners: Female   Other Topics Concern    Caffeine Concern Yes      done   Comment: coffee,1 cup    Reaction to local anesthetic No    Pt has a pacemaker No    Pt has a defibrillator No        Allergies   Allergen Reactions    Metoprolol OTHER (SEE COMMENTS)    Seasonal OTHER (SEE COMMENTS)     Seasonal spring      Current Outpatient Medications   Medication Sig Dispense Refill    montelukast (SINGULAIR) 10 MG Oral Tab Take 1 tablet (10 mg total) by mouth daily. 30 tablet 0    pantoprazole 40 MG Oral Tab EC Take 1 tablet (40 mg total) by mouth before breakfast. 90 tablet 0    Tazarotene 0.1 % External Cream USE AT BEDTIME AS DIRECTED 30 g 3    Fluocin-Hydroquinone-Tretinoin (TRI-LAITH) 0.01-4-0.05 % External Cream Use qohs as directed 30 g 5    triamcinolone 0.1 % External Cream Apply 1 Application topically 2 (two) times daily. To areas of itching/ eczema 80 g 3    carvedilol 3.125 MG Oral Tab Take 1 tablet (3.125 mg) in the morning and 2 tablets (total of 6.25 mg) in the evening 180 tablet     saccharomyces boulardii (FLORASTOR) 250 MG Oral Cap Take 1 capsule (250 mg total) by mouth 2 (two) times daily. 60 capsule 0    rosuvastatin 40 MG Oral Tab Take 1 tablet (40 mg total) by mouth nightly. 30 tablet 3    Multiple Vitamin Oral Tab Take 1 tablet by mouth.      aspirin 81 MG Oral Tab EC Take 1 tablet (81 mg total) by mouth daily.     Ros: sneezing , cough    OBJECTIVE  Physical Exam:   Awake alert oriented    ASSESSMENT & PLAN  There are no diagnoses linked to this encounter.    URI acute most likely viral advised him to do home COVID test, advised him to continue with steam can help with congestion, over-the-counter decongestant, I will send Singulair as well, take Tylenol alternating with ibuprofen as needed for fever chills or bodyaches    [id number]  JOSE A ELIZONDO MD

## 2024-02-10 ENCOUNTER — OFFICE VISIT (OUTPATIENT)
Dept: INTERNAL MEDICINE CLINIC | Facility: CLINIC | Age: 59
End: 2024-02-10
Payer: COMMERCIAL

## 2024-02-10 VITALS
HEIGHT: 70 IN | HEART RATE: 62 BPM | TEMPERATURE: 98 F | DIASTOLIC BLOOD PRESSURE: 80 MMHG | BODY MASS INDEX: 20.9 KG/M2 | WEIGHT: 146 LBS | RESPIRATION RATE: 18 BRPM | SYSTOLIC BLOOD PRESSURE: 124 MMHG

## 2024-02-10 DIAGNOSIS — E78.5 HYPERLIPIDEMIA, UNSPECIFIED HYPERLIPIDEMIA TYPE: ICD-10-CM

## 2024-02-10 DIAGNOSIS — I10 ESSENTIAL HYPERTENSION: ICD-10-CM

## 2024-02-10 DIAGNOSIS — Z00.00 ROUTINE PHYSICAL EXAMINATION: Primary | ICD-10-CM

## 2024-02-10 DIAGNOSIS — I25.10 CORONARY ARTERY DISEASE INVOLVING NATIVE CORONARY ARTERY OF NATIVE HEART WITHOUT ANGINA PECTORIS: ICD-10-CM

## 2024-02-10 DIAGNOSIS — R73.09 ELEVATED GLUCOSE: ICD-10-CM

## 2024-02-10 DIAGNOSIS — F41.9 ANXIETY: ICD-10-CM

## 2024-02-10 PROCEDURE — 3079F DIAST BP 80-89 MM HG: CPT | Performed by: INTERNAL MEDICINE

## 2024-02-10 PROCEDURE — 99396 PREV VISIT EST AGE 40-64: CPT | Performed by: INTERNAL MEDICINE

## 2024-02-10 PROCEDURE — 3008F BODY MASS INDEX DOCD: CPT | Performed by: INTERNAL MEDICINE

## 2024-02-10 PROCEDURE — 3074F SYST BP LT 130 MM HG: CPT | Performed by: INTERNAL MEDICINE

## 2024-02-10 NOTE — PROGRESS NOTES
HPI:    Patient ID: Renaewshamaximo Mart is a 58 year old male.    HPI  Patient is here requesting physical exam.  Last seen in the office about 6 months ago for follow-up.  Had some increasing tinnitus at that time.  Since that time he has seen ENT and audiology.  Also seen podiatry and dermatology.  Current medications reviewed.  Health maintenance and vaccination status reviewed.  Patient is due for full blood work.  Blood pressure is well-controlled in the office today.  Weight is stable.    Overall, things are pretty good. He has some upper GI and flatulence issues; better with omeprazole; seen by GI and placed on omeprazole. Posture is not great; he tends to slouch; he is thinking about using a brace for that goes around his shoulders and upper back. He has seen a dietician for elevated sugar and cholesterol; he increased protein. He sleeps well. Goes to the gym 3-4 times a week; does treadmill, weights. Patient does check blood pressure at home. It has been running around 132/80, 125/82. Still with anxiety; he sees a therapist once a month. No tobacco or EtOH or drugs. Still with anxiety; he does not work; not a lot of interaction with people.       Patient Active Problem List   Diagnosis    Essential hypertension    Hyperlipidemia    Abnormal findings on diagnostic imaging of cardiovascular system    Coronary artery disease involving native heart with angina pectoris (HCC)    Hyperopia with presbyopia of both eyes    Subjective visual disturbance of both eyes    Meibomian gland dysfunction (MGD) of both eyes    Post-inflammatory hyperpigmentation    Pruritic disorder    Nummular eczema    Abdominal bloating    Poikiloderma    CAD (coronary artery disease)    Gas bloat syndrome    COVID-19          HISTORY:  Past Medical History:   Diagnosis Date    Atherosclerosis of coronary artery 06/26/2015    Stenting of Left Circumflex and Ramus    Coronary atherosclerosis     Essential hypertension 12/2006    Heart disease      High blood pressure     High cholesterol     Hyperlipidemia       Past Surgical History:   Procedure Laterality Date    ANGIOPLASTY (CORONARY)  8/2015    CABG      x 4    CATH DRUG ELUTING STENT  06/02/2015    COLONOSCOPY N/A 07/03/2018    Procedure: COLONOSCOPY;  Surgeon: Skinny Shi MD;  Location: [facility] ENDOSCOPY    INCISION OF PYLORIC MUSCLE  1967    SKIN SURGERY Left 12/10/2020    epidermal inclusion cyst, lower back      Family History   Problem Relation Age of Onset    Heart Disease Father         premature CAD (cause of death)    Lipids Mother         hyperlipidemia    Stroke Paternal Grandmother         CVA    Diabetes Other         family h/o    Stroke Other         family h/o CVA    Stroke Cousin         CVA    Glaucoma Neg     Macular degeneration Neg       Social History     Socioeconomic History    Marital status:     Number of children: 2   Occupational History    Occupation: DATA  ANALYST     Employer: [employer]   Tobacco Use    Smoking status: Never    Smokeless tobacco: Never   Vaping Use    Vaping Use: Never used   Substance and Sexual Activity    Alcohol use: No    Drug use: No    Sexual activity: Yes     Partners: Female   Other Topics Concern    Caffeine Concern Yes     Comment: coffee,1 cup    Reaction to local anesthetic No    Pt has a pacemaker No    Pt has a defibrillator No          Review of Systems   Constitutional:  Negative for chills and fever.   Respiratory:  Negative for shortness of breath.    Cardiovascular:  Negative for chest pain and palpitations.   Genitourinary:  Negative for dysuria and hematuria.             Current Outpatient Medications   Medication Sig Dispense Refill    montelukast (SINGULAIR) 10 MG Oral Tab Take 1 tablet (10 mg total) by mouth daily. 30 tablet 0    pantoprazole 40 MG Oral Tab EC Take 1 tablet (40 mg total) by mouth before breakfast. 90 tablet 0    Tazarotene 0.1 % External Cream USE AT BEDTIME AS DIRECTED 30 g 3     Fluocin-Hydroquinone-Tretinoin (TRI-LAITH) 0.01-4-0.05 % External Cream Use qohs as directed 30 g 5    triamcinolone 0.1 % External Cream Apply 1 Application topically 2 (two) times daily. To areas of itching/ eczema 80 g 3    carvedilol 3.125 MG Oral Tab Take 1 tablet (3.125 mg) in the morning and 2 tablets (total of 6.25 mg) in the evening 180 tablet     saccharomyces boulardii (FLORASTOR) 250 MG Oral Cap Take 1 capsule (250 mg total) by mouth 2 (two) times daily. 60 capsule 0    rosuvastatin 40 MG Oral Tab Take 1 tablet (40 mg total) by mouth nightly. 30 tablet 3    Multiple Vitamin Oral Tab Take 1 tablet by mouth.      aspirin 81 MG Oral Tab EC Take 1 tablet (81 mg total) by mouth daily.       Allergies:  Allergies   Allergen Reactions    Metoprolol OTHER (SEE COMMENTS)    Seasonal OTHER (SEE COMMENTS)     Seasonal spring        PHYSICAL EXAM:   /80 (BP Location: Left arm, Patient Position: Sitting, Cuff Size: large)   Pulse 62   Temp 98 °F (36.7 °C) (Other)   Resp 18   Ht 5' 10\" (1.778 m)   Wt 146 lb (66.2 kg)   BMI 20.95 kg/m²      Physical Exam  Constitutional:       Appearance: Normal appearance. He is well-developed.   HENT:      Right Ear: Tympanic membrane and ear canal normal.      Left Ear: Tympanic membrane and ear canal normal.      Nose: Nose normal.      Mouth/Throat:      Pharynx: No oropharyngeal exudate or posterior oropharyngeal erythema.   Eyes:      Conjunctiva/sclera: Conjunctivae normal.      Pupils: Pupils are equal, round, and reactive to light.   Neck:      Thyroid: No thyromegaly.      Vascular: No carotid bruit.   Cardiovascular:      Rate and Rhythm: Normal rate and regular rhythm.      Pulses: Normal pulses.      Heart sounds: Normal heart sounds. No murmur heard.  Pulmonary:      Effort: Pulmonary effort is normal.      Breath sounds: Normal breath sounds. No wheezing or rales.   Abdominal:      General: Bowel sounds are normal.      Palpations: Abdomen is soft. There is  no mass.      Tenderness: There is no abdominal tenderness.      Hernia: There is no hernia in the left inguinal area.   Genitourinary:     Penis: Normal and circumcised.       Testes: Normal.   Musculoskeletal:      Right lower leg: No edema.      Left lower leg: No edema.   Lymphadenopathy:      Cervical: No cervical adenopathy.   Skin:     General: Skin is warm and dry.      Findings: No rash.   Neurological:      General: No focal deficit present.      Mental Status: He is alert.      Cranial Nerves: No cranial nerve deficit.      Coordination: Coordination normal.   Psychiatric:         Mood and Affect: Mood normal.         Behavior: Behavior normal.         Thought Content: Thought content normal.         Judgment: Judgment normal.          Wt Readings from Last 6 Encounters:   02/10/24 146 lb (66.2 kg)   08/30/23 140 lb (63.5 kg)   08/04/23 144 lb (65.3 kg)   07/14/23 143 lb (64.9 kg)   02/11/23 141 lb (64 kg)   11/12/22 140 lb (63.5 kg)             ASSESSMENT/PLAN:   1. Routine physical examination physical exam is unremarkable.  Active issues as below.  Health maintenance issues reviewed.  Encouraged continued healthy diet and regular exercise.  Follow-up in 6 months.  We will check fasting blood work and contact patient with results.     - CBC With Differential With Platelet; Future  - Comp Metabolic Panel (14); Future  - Lipid Panel; Future  - TSH W Reflex To Free T4; Future  - PSA Total, Screen; Future  - Hemoglobin A1C; Future  - Vitamin B12; Future    2. Essential hypertension  Well-controlled.  Continue current treatment.  Work on diet and exercise.    3. Hyperlipidemia, unspecified hyperlipidemia type  Check lipid profile.  Adjust dose of medication if needed.    4. Coronary artery disease involving native coronary artery of native heart without angina pectoris  Asymptomatic.  Doing well.  Continue follow-up with cardiology.    5. Anxiety  Continue with monthly counseling sessions.  Strongly  encourage getting out more and being more social.    6. Elevated glucose  Check blood sugar and A1c.  - Hemoglobin A1C; Future         Meds This Visit:  Requested Prescriptions      No prescriptions requested or ordered in this encounter       Imaging & Referrals:  None         Jose Antonio Purcell MD

## 2024-02-11 ENCOUNTER — LAB ENCOUNTER (OUTPATIENT)
Dept: LAB | Facility: HOSPITAL | Age: 59
End: 2024-02-11
Attending: INTERNAL MEDICINE
Payer: COMMERCIAL

## 2024-02-11 DIAGNOSIS — Z00.00 ROUTINE PHYSICAL EXAMINATION: ICD-10-CM

## 2024-02-11 DIAGNOSIS — R73.09 ELEVATED GLUCOSE: ICD-10-CM

## 2024-02-11 LAB
ALBUMIN SERPL-MCNC: 4.1 G/DL (ref 3.2–4.8)
ALBUMIN/GLOB SERPL: 1.5 {RATIO} (ref 1–2)
ALP LIVER SERPL-CCNC: 54 U/L
ALT SERPL-CCNC: 25 U/L
ANION GAP SERPL CALC-SCNC: 5 MMOL/L (ref 0–18)
AST SERPL-CCNC: 25 U/L (ref ?–34)
BASOPHILS # BLD AUTO: 0.03 X10(3) UL (ref 0–0.2)
BASOPHILS NFR BLD AUTO: 0.6 %
BILIRUB SERPL-MCNC: 0.7 MG/DL (ref 0.3–1.2)
BUN BLD-MCNC: 22 MG/DL (ref 9–23)
BUN/CREAT SERPL: 22.9 (ref 10–20)
CALCIUM BLD-MCNC: 9 MG/DL (ref 8.7–10.4)
CHLORIDE SERPL-SCNC: 108 MMOL/L (ref 98–112)
CHOLEST SERPL-MCNC: 93 MG/DL (ref ?–200)
CO2 SERPL-SCNC: 27 MMOL/L (ref 21–32)
COMPLEXED PSA SERPL-MCNC: 0.6 NG/ML (ref ?–4)
CREAT BLD-MCNC: 0.96 MG/DL
DEPRECATED RDW RBC AUTO: 44.7 FL (ref 35.1–46.3)
EGFRCR SERPLBLD CKD-EPI 2021: 93 ML/MIN/1.73M2 (ref 60–?)
EOSINOPHIL # BLD AUTO: 0.09 X10(3) UL (ref 0–0.7)
EOSINOPHIL NFR BLD AUTO: 1.8 %
ERYTHROCYTE [DISTWIDTH] IN BLOOD BY AUTOMATED COUNT: 13.6 % (ref 11–15)
EST. AVERAGE GLUCOSE BLD GHB EST-MCNC: 120 MG/DL (ref 68–126)
FASTING PATIENT LIPID ANSWER: YES
FASTING STATUS PATIENT QL REPORTED: YES
GLOBULIN PLAS-MCNC: 2.8 G/DL (ref 2.8–4.4)
GLUCOSE BLD-MCNC: 114 MG/DL (ref 70–99)
HBA1C MFR BLD: 5.8 % (ref ?–5.7)
HCT VFR BLD AUTO: 45.5 %
HDLC SERPL-MCNC: 34 MG/DL (ref 40–59)
HGB BLD-MCNC: 14.8 G/DL
IMM GRANULOCYTES # BLD AUTO: 0.01 X10(3) UL (ref 0–1)
IMM GRANULOCYTES NFR BLD: 0.2 %
LDLC SERPL CALC-MCNC: 45 MG/DL (ref ?–100)
LYMPHOCYTES # BLD AUTO: 1.26 X10(3) UL (ref 1–4)
LYMPHOCYTES NFR BLD AUTO: 25 %
MCH RBC QN AUTO: 29 PG (ref 26–34)
MCHC RBC AUTO-ENTMCNC: 32.5 G/DL (ref 31–37)
MCV RBC AUTO: 89.2 FL
MONOCYTES # BLD AUTO: 0.37 X10(3) UL (ref 0.1–1)
MONOCYTES NFR BLD AUTO: 7.3 %
NEUTROPHILS # BLD AUTO: 3.28 X10 (3) UL (ref 1.5–7.7)
NEUTROPHILS # BLD AUTO: 3.28 X10(3) UL (ref 1.5–7.7)
NEUTROPHILS NFR BLD AUTO: 65.1 %
NONHDLC SERPL-MCNC: 59 MG/DL (ref ?–130)
OSMOLALITY SERPL CALC.SUM OF ELEC: 294 MOSM/KG (ref 275–295)
PLATELET # BLD AUTO: 169 10(3)UL (ref 150–450)
POTASSIUM SERPL-SCNC: 3.9 MMOL/L (ref 3.5–5.1)
PROT SERPL-MCNC: 6.9 G/DL (ref 5.7–8.2)
RBC # BLD AUTO: 5.1 X10(6)UL
SODIUM SERPL-SCNC: 140 MMOL/L (ref 136–145)
TRIGL SERPL-MCNC: 60 MG/DL (ref 30–149)
TSI SER-ACNC: 1.13 MIU/ML (ref 0.55–4.78)
VIT B12 SERPL-MCNC: 1010 PG/ML (ref 211–911)
VLDLC SERPL CALC-MCNC: 8 MG/DL (ref 0–30)
WBC # BLD AUTO: 5 X10(3) UL (ref 4–11)

## 2024-02-11 PROCEDURE — 82607 VITAMIN B-12: CPT

## 2024-02-11 PROCEDURE — 80061 LIPID PANEL: CPT

## 2024-02-11 PROCEDURE — 84443 ASSAY THYROID STIM HORMONE: CPT

## 2024-02-11 PROCEDURE — 83036 HEMOGLOBIN GLYCOSYLATED A1C: CPT

## 2024-02-11 PROCEDURE — 80053 COMPREHEN METABOLIC PANEL: CPT

## 2024-02-11 PROCEDURE — 85025 COMPLETE CBC W/AUTO DIFF WBC: CPT

## 2024-02-11 PROCEDURE — 36415 COLL VENOUS BLD VENIPUNCTURE: CPT

## 2024-02-13 ENCOUNTER — PATIENT MESSAGE (OUTPATIENT)
Dept: INTERNAL MEDICINE CLINIC | Facility: CLINIC | Age: 59
End: 2024-02-13

## 2024-02-14 NOTE — TELEPHONE ENCOUNTER
----- Message from Carly Barrios MD sent at 4/26/2019  9:18 AM CDT -----  Regarding: RE: Patient Call-DOS 5/3/2019, MARIMAR BARRIOS, can you please call patient and see how he is doing.  If his weakness is not worsening or improving I think waiting would be a reasonable option.  If symptoms are getting worst I would like to repeat another MRI of cervical spine with oblique reformat.  Thanks.   ----- Message -----  From: Jordyn Pond  Sent: 4/23/2019   1:03 PM  To: Carly Barrios MD, Megan Lopez PA-C, #  Subject: Patient Call-DOS 5/3/2019, MARIMAR BARRIOS     Patient called because his surgery scheduled on 5/3/2019 has to be put on hold until August. His Cardiologist Dr. Overton placed a Stent in the patient on 02/21/2019 and will not approve for him to stop any Nsaids until sometime in August. Patient would like a call from the Clinical Staff to see what he can possible do in the meantime.    Thanks,   Andreina     [employer] [facility] message  sent to pt.

## 2024-02-15 RX ORDER — MONTELUKAST SODIUM 10 MG/1
10 TABLET ORAL DAILY
Qty: 90 TABLET | Refills: 3 | Status: SHIPPED | OUTPATIENT
Start: 2024-02-15

## 2024-02-15 NOTE — TELEPHONE ENCOUNTER
Please review. Protocol failed or has no protocol.     Requested Prescriptions   Pending Prescriptions Disp Refills    MONTELUKAST 10 MG Oral Tab [Pharmacy Med Name: MONTELUKAST 10MG TABLETS] 30 tablet 0     Sig: TAKE 1 TABLET(10 MG) BY MOUTH DAILY       Asthma & COPD Medication Protocol Failed - 2/13/2024  3:45 PM        Failed - Asthma Action Score greater than or equal to 20        Failed - AAP/ACT given in last 12 months     No data recorded  No data recorded  No data recorded  No data recorded          Passed - Appointment in past 6 or next 3 months      Recent Outpatient Visits              5 days ago Routine physical examination    [facility] Jose Antonio Purcell MD    Office Visit    1 month [facility]    [facility]medicine    4 months ago [facility]    Office Visit    4 months ago Onychomycosis    [facility] [address], [place] Marcio Crane DPM    Office Visit    5 months ago [facility]    [facility] Visit          Future Appointments         Provider Department Appt Notes    In 1 month Marcio Crane DPM [facility] [address], [place] 6 mo f/u                    Recent Outpatient Visits              5 days ago [facility]    Office Visit    1 month ago [facility]    [facility]medicine    4 months ago [facility]ermatitis    [facility]    Office Visit    4 months ago Onychomycosis    [facility] [address], [place] Marcio Crane DPM     Office Visit    5 months ago [facility]    Office Visit            Future Appointments         Provider Department Appt Notes    In 1 month Marcio Crane, DPM [facility], [address], [place] 6 mo f/u

## 2024-07-01 ENCOUNTER — NURSE TRIAGE (OUTPATIENT)
Dept: INTERNAL MEDICINE CLINIC | Facility: CLINIC | Age: 59
End: 2024-07-01

## 2024-07-01 NOTE — TELEPHONE ENCOUNTER
Action Requested: Summary for Provider     []  Critical Lab, Recommendations Needed  [] Need Additional Advice  []   FYI    []   Need Orders  [] Need Medications Sent to Pharmacy  []  Other     SUMMARY: Recommended home care, call back if not effective     Reason for call: Allergies  Onset: n/a     Patient states he has ringing in his ears at times, currently worse in left. Denies pain/drainage or redness. Afebrile. Currently takes Montelukast and fluticasone.

## 2024-07-01 NOTE — TELEPHONE ENCOUNTER
Reason for Disposition  • Nasal allergies occur only certain times of year    Protocols used: Nasal Allergies (Hay Fever)-A-OH

## 2024-07-13 ENCOUNTER — LAB ENCOUNTER (OUTPATIENT)
Dept: LAB | Facility: HOSPITAL | Age: 59
End: 2024-07-13
Attending: INTERNAL MEDICINE
Payer: COMMERCIAL

## 2024-07-13 DIAGNOSIS — E78.5 HYPERLIPIDEMIA: Primary | ICD-10-CM

## 2024-07-13 LAB
ALT SERPL-CCNC: 23 U/L
AST SERPL-CCNC: 24 U/L (ref ?–34)
CHOLEST SERPL-MCNC: 96 MG/DL (ref ?–200)
FASTING PATIENT LIPID ANSWER: YES
HDLC SERPL-MCNC: 36 MG/DL (ref 40–59)
LDLC SERPL CALC-MCNC: 46 MG/DL (ref ?–100)
NONHDLC SERPL-MCNC: 60 MG/DL (ref ?–130)
TRIGL SERPL-MCNC: 62 MG/DL (ref 30–149)
VLDLC SERPL CALC-MCNC: 9 MG/DL (ref 0–30)

## 2024-07-13 PROCEDURE — 36415 COLL VENOUS BLD VENIPUNCTURE: CPT

## 2024-07-13 PROCEDURE — 84450 TRANSFERASE (AST) (SGOT): CPT

## 2024-07-13 PROCEDURE — 84460 ALANINE AMINO (ALT) (SGPT): CPT

## 2024-07-13 PROCEDURE — 80061 LIPID PANEL: CPT

## 2024-08-17 ENCOUNTER — OFFICE VISIT (OUTPATIENT)
Dept: INTERNAL MEDICINE CLINIC | Facility: CLINIC | Age: 59
End: 2024-08-17

## 2024-08-17 VITALS
RESPIRATION RATE: 18 BRPM | HEIGHT: 70 IN | OXYGEN SATURATION: 97 % | SYSTOLIC BLOOD PRESSURE: 134 MMHG | BODY MASS INDEX: 22.1 KG/M2 | DIASTOLIC BLOOD PRESSURE: 86 MMHG | WEIGHT: 154.38 LBS | HEART RATE: 65 BPM

## 2024-08-17 DIAGNOSIS — I10 ESSENTIAL HYPERTENSION: Primary | ICD-10-CM

## 2024-08-17 DIAGNOSIS — E78.5 HYPERLIPIDEMIA, UNSPECIFIED HYPERLIPIDEMIA TYPE: ICD-10-CM

## 2024-08-17 DIAGNOSIS — K92.89 GAS BLOAT SYNDROME: ICD-10-CM

## 2024-08-17 DIAGNOSIS — H93.19 TINNITUS, UNSPECIFIED LATERALITY: ICD-10-CM

## 2024-08-17 DIAGNOSIS — I25.10 CORONARY ARTERY DISEASE INVOLVING NATIVE CORONARY ARTERY OF NATIVE HEART WITHOUT ANGINA PECTORIS: ICD-10-CM

## 2024-08-17 PROCEDURE — G2211 COMPLEX E/M VISIT ADD ON: HCPCS | Performed by: INTERNAL MEDICINE

## 2024-08-17 PROCEDURE — 3075F SYST BP GE 130 - 139MM HG: CPT | Performed by: INTERNAL MEDICINE

## 2024-08-17 PROCEDURE — 3008F BODY MASS INDEX DOCD: CPT | Performed by: INTERNAL MEDICINE

## 2024-08-17 PROCEDURE — 3079F DIAST BP 80-89 MM HG: CPT | Performed by: INTERNAL MEDICINE

## 2024-08-17 PROCEDURE — 99214 OFFICE O/P EST MOD 30 MIN: CPT | Performed by: INTERNAL MEDICINE

## 2024-08-17 RX ORDER — CARVEDILOL 6.25 MG/1
6.25 TABLET ORAL 2 TIMES DAILY WITH MEALS
Qty: 180 TABLET | Refills: 1 | Status: SHIPPED | OUTPATIENT
Start: 2024-08-17

## 2024-08-17 NOTE — PROGRESS NOTES
HPI:    Patient ID: Renaewsmaria g Mart is a 58 year old male.    HPI    Patient returns to the office today to discuss chronic medical issues as listed on the active problem list below.  Patient last seen in the office by me on February 10 of this year for general physical exam.  During the last visit, the following changes were made: None.  Full blood work done at that time.  Normal CBC, lipid profile, TSH, B12.  CMP normal except for blood sugar 114 with an A1c of 5.8.  No prior history of diabetes.  Since the last visit, the patient has seen the following doctors: Cardiology about a month ago.  No changes were made.  Lipid profile, AST, ALT done about a month ago were all normal.    Today, the patient offers the following complaints:  Things are going fine. He got a permanent job recently (first one in years). So he is having less stress and anxiety now. He has a lot of gas. He takes pantoprazole as needed- about 3-4 times a week. He can have cramping and gas in the epigastirc area. His tinnitus is a little bit louder He takes meds for allergies. His hearing is ok. Ringing is only on the left ear; it is a ringing; no pulsing. He saw ENT for a work up in the past for this. Patient does check blood pressure at home. It has been running around 125/85. Diet is good; he really watches what he eats; he takes protein supplement. For exercise, he jogs, lifts weights. His weight is up about 10 pounds.   BP at Cardio office in July was 132/80.   Tobacco and alcohol use reviewed.   Current medications reviewed.   Health maintenance issues reviewed.    Wt Readings from Last 6 Encounters:   08/17/24 154 lb 6 oz (70 kg)   02/10/24 146 lb (66.2 kg)   08/30/23 140 lb (63.5 kg)   08/04/23 144 lb (65.3 kg)   07/14/23 143 lb (64.9 kg)   02/11/23 141 lb (64 kg)       Patient Active Problem List   Diagnosis    Essential hypertension    Hyperlipidemia    Abnormal findings on diagnostic imaging of cardiovascular system    Coronary artery  disease involving native heart with angina pectoris (HCC)    Hyperopia with presbyopia of both eyes    Subjective visual disturbance of both eyes    Meibomian gland dysfunction (MGD) of both eyes    Post-inflammatory hyperpigmentation    Pruritic disorder    Nummular eczema    Abdominal bloating    Poikiloderma    CAD (coronary artery disease)    Gas bloat syndrome    COVID-19        HISTORY:  Past Medical History:    Atherosclerosis of coronary artery    Stenting of Left Circumflex and Ramus    Coronary atherosclerosis    Essential hypertension    Heart disease    High blood pressure    High cholesterol    Hyperlipidemia      Past Surgical History:   Procedure Laterality Date    Angioplasty (coronary)  8/2015    Cabg      x 4    Cath drug eluting stent  06/02/2015    Colonoscopy N/A 07/03/2018    Procedure: COLONOSCOPY;  Surgeon: Skinny Shi MD;  Location: [facility] ENDOSCOPY    Incision of pyloric muscle  1967    Skin surgery Left 12/10/2020    epidermal inclusion cyst, lower back      Family History   Problem Relation Age of Onset    Heart Disease Father         premature CAD (cause of death)    Lipids Mother         hyperlipidemia    Stroke Paternal Grandmother         CVA    Diabetes Other         family h/o    Stroke Other         family h/o CVA    Stroke Cousin         CVA    Glaucoma Neg     Macular degeneration Neg       Social History     Socioeconomic History    Marital status:     Number of children: 2   Occupational History    Occupation: DATA  ANALYST     Employer: [employer]   Tobacco Use    Smoking status: Never    Smokeless tobacco: Never   Vaping Use    Vaping status: Never Used   Substance and Sexual Activity    Alcohol use: No    Drug use: No    Sexual activity: Yes     Partners: Female   Other Topics Concern    Caffeine Concern Yes     Comment: coffee,1 cup    Reaction to local anesthetic No    Pt has a pacemaker No    Pt has a defibrillator No     Social Determinants of Health      Financial Resource Strain: Low Risk  (3/30/2022)    [facility]    Overall Financial Resource Strain (CARDIA)     Difficulty of Paying Living Expenses: Not very hard   Transportation Needs: No Transportation Needs (3/30/2022)    [facility]    PRAPARE - Transportation     Lack of Transportation (Medical): No     Lack of Transportation (Non-Medical): No          Review of Systems          Current Outpatient Medications   Medication Sig Dispense Refill    Omega-3 Fatty Acids (FISH OIL OR) Take by mouth.      montelukast 10 MG Oral Tab Take 1 tablet (10 mg total) by mouth daily. 90 tablet 3    pantoprazole 40 MG Oral Tab EC Take 1 tablet (40 mg total) by mouth before breakfast. 90 tablet 0    Tazarotene 0.1 % External Cream USE AT BEDTIME AS DIRECTED 30 g 3    Fluocin-Hydroquinone-Tretinoin (TRI-LAITH) 0.01-4-0.05 % External Cream Use qohs as directed 30 g 5    triamcinolone 0.1 % External Cream Apply 1 Application topically 2 (two) times daily. To areas of itching/ eczema 80 g 3    carvedilol 3.125 MG Oral Tab Take 1 tablet (3.125 mg) in the morning and 2 tablets (total of 6.25 mg) in the evening 180 tablet     saccharomyces boulardii (FLORASTOR) 250 MG Oral Cap Take 1 capsule (250 mg total) by mouth 2 (two) times daily. 60 capsule 0    rosuvastatin 40 MG Oral Tab Take 1 tablet (40 mg total) by mouth nightly. 30 tablet 3    Multiple Vitamin Oral Tab Take 1 tablet by mouth.      aspirin 81 MG Oral Tab EC Take 1 tablet (81 mg total) by mouth daily.       Allergies:  Allergies   Allergen Reactions    Metoprolol OTHER (SEE COMMENTS)    Seasonal OTHER (SEE COMMENTS)     Seasonal spring        PHYSICAL EXAM:   /86   Pulse 65   Resp 18   Ht 5' 10\" (1.778 m)   Wt 154 lb 6 oz (70 kg)   SpO2 97%   BMI 22.15 kg/m²      Physical Exam  Constitutional:       Appearance: Normal appearance. He is well-developed.   HENT:      Nose:  Nose normal.      Mouth/Throat:      Pharynx: No oropharyngeal exudate or posterior oropharyngeal erythema.   Eyes:      Conjunctiva/sclera: Conjunctivae normal.   Neck:      Vascular: No carotid bruit.   Cardiovascular:      Rate and Rhythm: Normal rate and regular rhythm.      Pulses: Normal pulses.      Heart sounds: Normal heart sounds. No murmur heard.  Pulmonary:      Effort: Pulmonary effort is normal.      Breath sounds: Normal breath sounds. No wheezing or rales.   Abdominal:      General: Bowel sounds are normal.      Palpations: Abdomen is soft. There is no mass.      Tenderness: There is no abdominal tenderness.   Musculoskeletal:      Right lower leg: No edema.      Left lower leg: No edema.   Lymphadenopathy:      Cervical: No cervical adenopathy.   Skin:     General: Skin is warm and dry.      Findings: No rash.   Neurological:      General: No focal deficit present.      Mental Status: He is alert.   Psychiatric:         Mood and Affect: Mood normal.         Behavior: Behavior normal.         Thought Content: Thought content normal.                 ASSESSMENT/PLAN:   1. Essential hypertension  Blood pressure is borderline controlled both here and at home.  Various options discussed.  We will increase the carvedilol dosing.  Currently taking the 3.125 mg pill 1 pill in the morning and 2 in the evening.  We will increase that up to 2 pills twice a day.  When he is out of the current supply, we will switch over to 6.25 mg twice daily.  Prescription sent to pharmacy.  Check blood pressure.  Contact us with side effects.  Follow-up in 6 months.    2. Hyperlipidemia, unspecified hyperlipidemia type  Well-controlled.  Continue current dose of rosuvastatin.  Recent lipid levels reviewed.    3. Coronary artery disease involving native coronary artery of native heart without angina pectoris  Stable and asymptomatic.  History of bypass surgery.  Follow-up with cardiology.    4. Tinnitus, unspecified  laterality  Still bothering him.  It has been a year since he saw ENT.  Advised to go back to see them again.    5. Gas bloat syndrome  Still bothering him.  He can try to minimize the use of pantoprazole.  He can use over-the-counter Gas-X or Phazyme.         Meds This Visit:  Requested Prescriptions      No prescriptions requested or ordered in this encounter       Imaging & Referrals:  None         Jose Antonio Purcell MD

## 2024-08-26 ENCOUNTER — TELEPHONE (OUTPATIENT)
Facility: CLINIC | Age: 59
End: 2024-08-26

## 2024-08-26 NOTE — TELEPHONE ENCOUNTER
August 24, 2024  Wendi LEI Peeroo   to [facility] Clinical Staff (supporting Ami Kim PA-C)         8/24/24  9:06 PM  Fidel Mueller,   I am currently taking the Pantropazole medication that you prescribed me for the gas and bloating stomach I am having. However I do take it on and off as needed.I do still having the gas and bloating stomach  problem but less. May I know your recommendation . Thank you.

## 2024-08-26 NOTE — TELEPHONE ENCOUNTER
Ami    Patient sent below SignNow message.  I spoke to him over the phone.  He said that when he takes the pantoprazole he feels fine.  However, when he stops the medication, the gas and bloating return.  He wants to know what to do going forward.    He said it is ok to leave a voicemail or send a SignNow message with response.    Thank you

## 2024-08-28 RX ORDER — PANTOPRAZOLE SODIUM 40 MG/1
40 TABLET, DELAYED RELEASE ORAL
Qty: 90 TABLET | Refills: 0 | Status: SHIPPED | OUTPATIENT
Start: 2024-08-28

## 2024-08-28 NOTE — TELEPHONE ENCOUNTER
PPI (Proton Pump Inhibitors): Nexium (Esomeprazole), Protonix (Pantoprazole), Dexilant (Dexlansoprazole), Prevacid (Lansoprazole), Aciphex (Rabeprazole), Omperazole      Protocol Criteria  Review last office visit note(s), plan of care, for any change     Appointment scheduled within the past 12 months or in the next 3 months or had a procedure (if applicable) and is up to date with their recall     Date of Last Office Visit: 9/12/2023    Date of next scheduled appointment: n/a    Date of last procedure (if applicable): n/a    Date of recall (if applicable):  n/a  If used for Bill's patient is up to date with appointment     Date of Last Office Visit

## 2024-10-16 ENCOUNTER — OFFICE VISIT (OUTPATIENT)
Dept: PODIATRY CLINIC | Facility: CLINIC | Age: 59
End: 2024-10-16
Payer: COMMERCIAL

## 2024-10-16 DIAGNOSIS — B35.1 ONYCHOMYCOSIS: Primary | ICD-10-CM

## 2024-10-16 DIAGNOSIS — L60.3 NAIL DYSTROPHY: ICD-10-CM

## 2024-10-16 PROCEDURE — 99213 OFFICE O/P EST LOW 20 MIN: CPT | Performed by: STUDENT IN AN ORGANIZED HEALTH CARE EDUCATION/TRAINING PROGRAM

## 2024-10-16 RX ORDER — CICLOPIROX 80 MG/ML
SOLUTION TOPICAL
Qty: 19.8 ML | Refills: 2 | Status: SHIPPED | OUTPATIENT
Start: 2024-10-16

## 2024-10-17 NOTE — PROGRESS NOTES
[facility]  Progress Note    Wendi Quintanaoo is a 59 year old male.   Chief Complaint   Patient presents with    Toenail Fungus     Left hallux toenail fungus- yellow- dark- toenail is thick          HPI:     Patient is a pleasant 59-year-old male who presents to clinic for evaluation of left hallux nail fungus.  His nail is yellow and thickened.  He has been treated with ketoconazole cream with Dr. Crane in the past.  He has not noticed much improvement.  He is hoping to avoid oral medication at this time.  He is wondering what other treatment options are available.  No other complaints are mentioned.  Past medical history, medications, and allergies are reviewed.      Allergies: Metoprolol and Seasonal   Current Outpatient Medications   Medication Sig Dispense Refill    Ciclopirox 8 % External Solution Apply topically to affected site daily, wash off once per week 19.8 mL 2    Omega-3 Fatty Acids (FISH OIL OR) Take by mouth.      carvedilol 6.25 MG Oral Tab Take 1 tablet (6.25 mg total) by mouth 2 (two) times daily with meals. 180 tablet 1    montelukast 10 MG Oral Tab Take 1 tablet (10 mg total) by mouth daily. 90 tablet 3    Tazarotene 0.1 % External Cream USE AT BEDTIME AS DIRECTED 30 g 3    Fluocin-Hydroquinone-Tretinoin (TRI-LAITH) 0.01-4-0.05 % External Cream Use qohs as directed 30 g 5    triamcinolone 0.1 % External Cream Apply 1 Application topically 2 (two) times daily. To areas of itching/ eczema 80 g 3    saccharomyces boulardii (FLORASTOR) 250 MG Oral Cap Take 1 capsule (250 mg total) by mouth 2 (two) times daily. 60 capsule 0    rosuvastatin 40 MG Oral Tab Take 1 tablet (40 mg total) by mouth nightly. 30 tablet 3    Multiple Vitamin Oral Tab Take 1 tablet by mouth.      aspirin 81 MG Oral Tab EC Take 1 tablet (81 mg total) by mouth daily.      pantoprazole 40 MG Oral Tab EC Take 1 tablet (40 mg total) by mouth before breakfast. 90 tablet 0      Past Medical History:     Atherosclerosis of coronary artery    Stenting of Left Circumflex and Ramus    Coronary atherosclerosis    Essential hypertension    Heart disease    High blood pressure    High cholesterol    Hyperlipidemia      Past Surgical History:   Procedure Laterality Date    Angioplasty (coronary)  8/2015    Cabg      x 4    Cath drug eluting stent  06/02/2015    Colonoscopy N/A 07/03/2018    Procedure: COLONOSCOPY;  Surgeon: Skinny Shi MD;  Location: [facility] ENDOSCOPY    Incision of pyloric muscle  1967    Skin surgery Left 12/10/2020    epidermal inclusion cyst, lower back      Family History   Problem Relation Age of Onset    Heart Disease Father         premature CAD (cause of death)    Lipids Mother         hyperlipidemia    Stroke Paternal Grandmother         CVA    Diabetes Other         family h/o    Stroke Other         family h/o CVA    Stroke Cousin         CVA    Glaucoma Neg     Macular degeneration Neg       Social History     Socioeconomic History    Marital status:     Number of children: 2   Occupational History    Occupation: DATA  ANALYST     Employer: [employer]   Tobacco Use    Smoking status: Never    Smokeless tobacco: Never   Vaping Use    Vaping status: Never Used   Substance and Sexual Activity    Alcohol use: No    Drug use: No    Sexual activity: Yes     Partners: Female   Other Topics Concern    Caffeine Concern Yes     Comment: coffee,1 cup    Reaction to local anesthetic No    Pt has a pacemaker No    Pt has a defibrillator No           REVIEW OF SYSTEMS:     No n/v/f/c.      EXAM:   There were no vitals taken for this visit.  GENERAL: well developed, well nourished, in no apparent distress  EXTREMITIES:       1. Integument: Normal skin temperature and turgor.  Left hallux nail is thickened, dystrophic, swollen debris.  2. Vascular: Dorsalis pedis two out of four bilateral and posterior tibial pulses two out of   four bilateral, capillary refill normal.   3. Musculoskeletal: All  muscle groups are graded 5 out of 5 in the foot and ankle.   4. Neurological: Normal sharp dull sensation; reflexes normal.          ASSESSMENT AND PLAN:   Diagnoses and all orders for this visit:    Onychomycosis  -     Ciclopirox 8 % External Solution; Apply topically to affected site daily, wash off once per week    Nail dystrophy        Plan:    -Patient examined, chart history reviewed.  -Discussed etiology of condition and various treatment options.  -Patient does have continued fungal changes to his left hallux nail.  -He would like to avoid oral Lamisil.  Will Rx ciclopirox.  -Today nails trimmed with nail approximately with Jeovannymel.  Can follow-up every 3 or 4 months for reevaluation and debridement of nail while we monitor for improvement.      -The patient indicates understanding of these issues and agrees to the plan.  Appreciate the opportunity participate in patient's care.        Afshin Adkins DPM    Dragon speech recognition software was used to prepare this note.  Errors in word recognition may occur.  Please contact me with any questions/concerns with this note.

## 2024-11-11 RX ORDER — PANTOPRAZOLE SODIUM 40 MG/1
40 TABLET, DELAYED RELEASE ORAL
Qty: 90 TABLET | Refills: 0 | Status: SHIPPED | OUTPATIENT
Start: 2024-11-11

## 2024-11-11 NOTE — TELEPHONE ENCOUNTER
Requested Prescriptions     Pending Prescriptions Disp Refills    PANTOPRAZOLE 40 MG Oral Tab EC [Pharmacy Med Name: PANTOPRAZOLE 40MG TABLETS] 90 tablet 0     Sig: TAKE 1 TABLET(40 MG) BY MOUTH BEFORE BREAKFAST     Last seen: 9/12/23  Suggested follow up:    Last refill: 8/28/24    Refill pended, please review/sign if agreeable.

## 2024-11-17 ENCOUNTER — PATIENT MESSAGE (OUTPATIENT)
Dept: INTERNAL MEDICINE CLINIC | Facility: CLINIC | Age: 59
End: 2024-11-17

## 2024-11-18 ENCOUNTER — OFFICE VISIT (OUTPATIENT)
Dept: OTOLARYNGOLOGY | Facility: CLINIC | Age: 59
End: 2024-11-18
Payer: COMMERCIAL

## 2024-11-18 ENCOUNTER — OFFICE VISIT (OUTPATIENT)
Dept: AUDIOLOGY | Facility: CLINIC | Age: 59
End: 2024-11-18
Payer: COMMERCIAL

## 2024-11-18 VITALS — BODY MASS INDEX: 22.05 KG/M2 | WEIGHT: 154 LBS | HEIGHT: 70 IN

## 2024-11-18 DIAGNOSIS — H90.3 SENSORINEURAL HEARING LOSS (SNHL) OF BOTH EARS: ICD-10-CM

## 2024-11-18 DIAGNOSIS — F45.8 BRUXISM: ICD-10-CM

## 2024-11-18 DIAGNOSIS — H93.13 TINNITUS OF BOTH EARS: Primary | ICD-10-CM

## 2024-11-18 DIAGNOSIS — H93.13 SUBJECTIVE TINNITUS, BILATERAL: Primary | ICD-10-CM

## 2024-11-18 DIAGNOSIS — H90.3 BILATERAL SENSORINEURAL HEARING LOSS: ICD-10-CM

## 2024-11-18 PROCEDURE — 92567 TYMPANOMETRY: CPT | Performed by: AUDIOLOGIST

## 2024-11-18 PROCEDURE — 99214 OFFICE O/P EST MOD 30 MIN: CPT | Performed by: OTOLARYNGOLOGY

## 2024-11-18 PROCEDURE — 92557 COMPREHENSIVE HEARING TEST: CPT | Performed by: AUDIOLOGIST

## 2024-11-18 PROCEDURE — 3008F BODY MASS INDEX DOCD: CPT | Performed by: OTOLARYNGOLOGY

## 2024-11-18 NOTE — PROGRESS NOTES
PATIENT PROGRESS NOTE  OTOLOGY/OTOLARYNGOLOGY    REF MD:  Jose Antonio Purcell Md  [facility] [place] [address]     PCP: Jose Antonio Purcell MD    CHIEF COMPLAINT:    Chief Complaint   Patient presents with    Ear Problem     Follow up- states no change since last visit     LAST VISIT - 8/30/2023  IMPRESSION:  Bilateral subjective non-pulsatile tinnitus, bothersome  Jaw clenching and anxiety  Bilateral sensorineural hearing loss     PLAN:  -Audiogram reviewed with patient   -I discussed a staged approach to tinnitus treatment including hearing aids +/- dietary modifications (caffeine cessation, low salt diet), lifestyle hygiene (sleeping regularly, stress reduction), tinnitus masking techniques and treatment of conditions which can worsen the perception of tinnitus (headache, jaw clenching/TMJ, cervicalgia). Recommend hearing protection in noisy environments. All patient questions answered. Also discussed supplements which might be helpful including magnesium and lipoflavonoids. Additional patient education also provided to the patient.   -Options for jaw clenching include massage, gentle stretches, physical therapy, Botox, muscle relaxants  -Consider cognitive behavioral therapy for tinnitus specifically and anxiety management  -Consider tinnitus evaluation with specialist audiologist   -Follow-up in 1 year for repeat audiogram   ___________________________________________________________________________  Interval history: Reports tinnitus, worse with stress and during silence (especially at night). Tinnitus more noticeable during colds due to ear congestion. Patient has tried taking magnesium. Reports avoiding music due to concerns about hearing.    HISTORY OF PRESENT ILLNESS: Wendi Mart is a 59 year old male who presents for evaluation of chronic hearing loss and constant hissing in bilateral ears, left worse than right. Hearing loss described as muffled hearing. Saw other specialist who recommended  hearing aids, but patient was unwilling at the time.     Endorses significant anxiety, currently unemployed and recovering from quadruple CABG. Talked to PCP about this who referred him to a therapist. Uses white noise machine at night but thinks it doesn't help. Endorses jaw clenching. Wears mouthguard at night.     PAST MEDICAL HISTORY:    Past Medical History:    Atherosclerosis of coronary artery    Stenting of Left Circumflex and Ramus    Coronary atherosclerosis    Essential hypertension    Heart disease    High blood pressure    High cholesterol    Hyperlipidemia       PAST SURGICAL HISTORY:    Past Surgical History:   Procedure Laterality Date    Angioplasty (coronary)  8/2015    Cabg      x 4    Cath drug eluting stent  06/02/2015    Colonoscopy N/A 07/03/2018    Procedure: COLONOSCOPY;  Surgeon: Skinny Shi MD;  Location: [facility] ENDOSCOPY    Incision of pyloric muscle  1967    Skin surgery Left 12/10/2020    epidermal inclusion cyst, lower back       Current Outpatient Medications on File Prior to Visit   Medication Sig Dispense Refill    PANTOPRAZOLE 40 MG Oral Tab EC TAKE 1 TABLET(40 MG) BY MOUTH BEFORE BREAKFAST 90 tablet 0    Ciclopirox 8 % External Solution Apply topically to affected site daily, wash off once per week 19.8 mL 2    Omega-3 Fatty Acids (FISH OIL OR) Take by mouth.      carvedilol 6.25 MG Oral Tab Take 1 tablet (6.25 mg total) by mouth 2 (two) times daily with meals. 180 tablet 1    montelukast 10 MG Oral Tab Take 1 tablet (10 mg total) by mouth daily. 90 tablet 3    Tazarotene 0.1 % External Cream USE AT BEDTIME AS DIRECTED 30 g 3    Fluocin-Hydroquinone-Tretinoin (TRI-LAITH) 0.01-4-0.05 % External Cream Use qohs as directed 30 g 5    triamcinolone 0.1 % External Cream Apply 1 Application topically 2 (two) times daily. To areas of itching/ eczema 80 g 3    saccharomyces boulardii (FLORASTOR) 250 MG Oral Cap Take 1 capsule (250 mg total) by mouth 2 (two) times daily. 60  capsule 0    rosuvastatin 40 MG Oral Tab Take 1 tablet (40 mg total) by mouth nightly. 30 tablet 3    Multiple Vitamin Oral Tab Take 1 tablet by mouth.      aspirin 81 MG Oral Tab EC Take 1 tablet (81 mg total) by mouth daily.       No current facility-administered medications on file prior to visit.       Allergies:   Allergies   Allergen Reactions    Metoprolol OTHER (SEE COMMENTS)    Seasonal OTHER (SEE COMMENTS)     Seasonal spring       SOCIAL HISTORY:    Social History     Tobacco Use    Smoking status: Never    Smokeless tobacco: Never   Substance Use Topics    Alcohol use: No       FAMILY HISTORY: Denies known family history of hearing loss, tinnitus, vertigo, or migraine.  Denies known family history of head and neck cancer, thyroid cancer, bleeding disorders.     REVIEW OF SYSTEMS:   Positives are in bold  Neuro: Headache, facial weakness, facial numbness, neck pain, vertigo  ENT: Hearing change, tinnitus, otorrhea, otalgia, aural fullness, ear pressure, vertigo, imbalance  Sinus pressure, rhinorrhea, congestion, facial pain, jaw pain, dysphagia, odynophagia, sore throat, voice changes, shortness of breath    EXAMINATION:  I washed my hands with an alcohol-based hand gel prior to examination  Constitutional:   --Vitals: Height 5' 10\" (1.778 m), weight 154 lb (69.9 kg).  --General: no apparent distress, well-developed, conversant  Psych: affect pleasant and appropriate for age, alert and oriented  Neuro: Facial movement normal bilateral  Eyes: Pupils equal, symmetric and reactive to light.  Extra-ocular muscles intact  Respiratory: No stridor, stertor or increased work of breathing  ENT:  --Ear: The bilateral ears were examined under binocular microscopy  Right ear microscopic exam:  Pinna: Normal, no lesions or masses.  Mastoid: Nontender on palpation.   External auditory canal: Clear, no masses or lesions.  Tympanic membrane: Intact, no lesions, normal landmarks.  Middle ear: Aerated.    Left ear  microscopic exam:  Pinna: Normal, no lesions or masses.  Mastoid: Nontender on palpation.   External auditory canal: Clear, no masses or lesions.  Tympanic membrane: Intact, no lesions, normal landmarks.  Middle ear: Aerated.      Latest Audiogram Result (Hz) Exam performed: 11/18/2024 10:33 AM Last edited by Roxy Heath AUD on 11/18/2024 10:39 AM        125 250  1500 2000 3000 4000 6000 8000    Right air:  10 0  10  5 60 65 50 45    Left air:  5 0  10  15 65 70 65 55    Left mastoid bone:   0  5  10 55 60         Reliability:  Good    Transducer:  Bone Oscillator, Inserts    Technique:  Conventional Audiometry    Comments:            Latest Speech Audiometry  Last edited by Roxy Heath AUD on 11/18/2024 10:42 AM       Ear Method PTA SAT SRT [facility] Test/list Score (%) Intensity Mask/noise Notes    right recorded   5   10 By Difficulty 92 55      left recorded   5   10 By Difficulty 92 55                    Latest Tympanogram Result       Probe Tone (Hz): 226 Exam performed: 11/18/2024 10:35 AM Last edited by Roxy Heath AUD on 11/18/2024 10:39 AM      Tympanograms  These were drawn by a user, not generated from device data      Right Ear Left Ear                     Right Ear Left Ear    Tympanogram type: Type A Type A    Canal volume (mL): 1.4 1.1    Peak pressure (daPa): -6 3    Peak amplitude (mmho): 0.3 0.26    Tympanogram width (daPa):        Comments:                     ASSESSMENT/PLAN:  Wendi Mart is a 59 year old male with     ICD-10-CM   1. Subjective tinnitus, bilateral  H93.13   2. Bruxism  F45.8   3. Bilateral sensorineural hearing loss  H90.3        IMPRESSION:  Bilateral subjective non-pulsatile tinnitus, bothersome  Jaw clenching and anxiety  Bilateral sensorineural hearing loss     PLAN:  -Audiogram reviewed with patient; stable compared to last.  -I discussed a staged approach to tinnitus treatment including hearing aids +/- dietary modifications  (caffeine cessation, low salt diet), lifestyle hygiene (sleeping regularly, stress reduction), tinnitus masking techniques and treatment of conditions which can worsen the perception of tinnitus (headache, jaw clenching/TMJ, cervicalgia). Recommend hearing protection in noisy environments. All patient questions answered. Also discussed supplements which might be helpful including magnesium and lipoflavonoids. Recommended musician's earplugs for loud environments. Additional patient education also provided to the patient.   -Options for jaw clenching include massage, gentle stretches, physical therapy, Botox, muscle relaxants. Has upcoming evaluation for this.   -Consider cognitive behavioral therapy for tinnitus specifically and anxiety management  -Consider tinnitus evaluation with specialist audiologist.   -Discussed the connection between cognitive decline and hearing loss/tinnitus   -Follow-up in 1-2 years for repeat audiogram     Situation reviewed with the patient in detail.    Attention: This note has been scribed by Kristie Diaz under the supervision of Tha Marquez MD.       Tha Marquez MD  Otology/Otolaryngology  [address] [place] [id number]  Phone [phone]  Fax [phone]     I have personally performed the services described in this documentation. All medical record entries made by the scribe were at my direction and in my presence. I have reviewed the chart and agree that the medical record reflects my personal performance and is accurate and complete.

## 2024-12-02 ENCOUNTER — TELEPHONE (OUTPATIENT)
Facility: CLINIC | Age: 59
End: 2024-12-02

## 2024-12-02 NOTE — TELEPHONE ENCOUNTER
Patient calling states is having gas and bloating after taking medication prescribed. Please call. (See patient message from today)

## 2024-12-03 NOTE — TELEPHONE ENCOUNTER
Please have patient take medication daily x 2 weeks and then let office know an update on his symptoms.     Ami Kim PA-C

## 2024-12-03 NOTE — TELEPHONE ENCOUNTER
Patient contacted,date of birth verified and message from Ami Kim given.  Patient voiced understanding.

## 2024-12-03 NOTE — TELEPHONE ENCOUNTER
Patient called back and states he continues to have increased gas after eating yogurt and broccoli.  Patient advised to stay away from milk products,broccoli,cauliflower and other gassy foods.  Taking Pantoprazole on and off but not on a daily basis.  Patient asking if he should be taking Pantoprazole daily or change to another medication.  Please advise. Thank you.

## 2024-12-09 ENCOUNTER — TELEPHONE (OUTPATIENT)
Dept: INTERNAL MEDICINE CLINIC | Facility: CLINIC | Age: 59
End: 2024-12-09

## 2024-12-09 DIAGNOSIS — R73.09 ELEVATED GLUCOSE: Primary | ICD-10-CM

## 2024-12-09 NOTE — TELEPHONE ENCOUNTER
Patient asking if he can have an order to check his blood sugars. He is also requesting a meter and supplies. Last HgA1C was 5.8 on 2/11/24. Patient currently at work. He states if he does not answer it is okay to leave a detailed message or send him a MyChart.       Fidel Adame,     Your blood sugar is elevated.  In prediabetic range.  Monitor your diet and avoid sweets.  Your kidney function and liver enzymes are normal.  Your cholesterol is normal.  Your vitamin B 12 level is within normal limits.  Your thyroid test is normal.  Your PSA screening is normal.  Your blood count is normal, no anemia.     STEPHEN Linton for Dr. Purcell   Written by NITESH Linton on 2/13/2024  2:26 PM CST

## 2024-12-15 ENCOUNTER — LAB ENCOUNTER (OUTPATIENT)
Dept: LAB | Facility: HOSPITAL | Age: 59
End: 2024-12-15
Attending: INTERNAL MEDICINE
Payer: COMMERCIAL

## 2024-12-15 DIAGNOSIS — R73.09 ELEVATED GLUCOSE: ICD-10-CM

## 2024-12-15 LAB
EST. AVERAGE GLUCOSE BLD GHB EST-MCNC: 128 MG/DL (ref 68–126)
FASTING PATIENT GLUCOSE ANSWER: YES
GLUCOSE BLD-MCNC: 114 MG/DL (ref 70–99)
HBA1C MFR BLD: 6.1 % (ref ?–5.7)

## 2024-12-15 PROCEDURE — 83036 HEMOGLOBIN GLYCOSYLATED A1C: CPT

## 2024-12-15 PROCEDURE — 36415 COLL VENOUS BLD VENIPUNCTURE: CPT

## 2024-12-15 PROCEDURE — 82947 ASSAY GLUCOSE BLOOD QUANT: CPT

## 2025-01-29 NOTE — TELEPHONE ENCOUNTER
Please call patient.  I would advise against having him check his blood sugars at home.  He has not been diagnosed with diabetes.  Insurance will almost certainly will not pay for these equipment.  Instead, he should come to the lab and get the blood sugar and A1c rechecked.  I placed the order.  It is a 12-hour fasting blood specimen.   Patient has no objection to blood transfusions.

## 2025-02-13 RX ORDER — CARVEDILOL 6.25 MG/1
6.25 TABLET ORAL 2 TIMES DAILY WITH MEALS
Qty: 180 TABLET | Refills: 0 | Status: SHIPPED | OUTPATIENT
Start: 2025-02-13

## 2025-02-13 NOTE — TELEPHONE ENCOUNTER
Refill passed per [facility] protocol but not refilled due to high level interaction warning.     Requested Prescriptions   Pending Prescriptions Disp Refills    CARVEDILOL 6.25 MG Oral Tab [Pharmacy Med Name: CARVEDILOL 6.25MG TABLETS] 180 tablet 1     Sig: TAKE 1 TABLET(6.25 MG) BY MOUTH TWICE DAILY WITH MEALS       Hypertension Medications Protocol Failed - 2/13/2025  7:55 AM        Failed - CMP or BMP in past 12 months        Failed - EGFRCR or GFRNAA > 50     GFR Evaluation            Passed - Last BP reading less than 140/90     BP Readings from Last 1 Encounters:   08/17/24 134/86               Passed - In person appointment or virtual visit in the past 12 mos or appointment in next 3 mos     Recent Outpatient Visits              2 months ago Tinnitus of both ears    [facility] Roxy Heath AUD    Office Visit    2 months ago Subjective tinnitus, bilateral    [facility] Tha Tompkins MD    Office Visit    4 months ago Onychomycosis    [facility] Afshin Adkins DPM    Office Visit    6 months ago Essential hypertension    [facility] Jose Antonio Purcell MD    Office Visit    1 year ago Routine physical examination    [facility] Jose Antonio Purcell MD    Office Visit          Future Appointments         Provider Department Appt Notes    In 1 week Afshin Adkins DPM [facility]     In 3 weeks Jose Antonio Purcell MD [facility] physical                    Passed - Medication is active on med list             [unfilled]      [unfilled]

## 2025-02-27 NOTE — PROGRESS NOTES
HPI:    Patient ID: Aden Burrell is a 48year old male. Please note that the following visit was completed using two-way, real-time interactive audio and/or video communication.   This has been done in good mile to provide continuity of care in the Right before leaving the writer discussed with the patient that the MD wanted her to take 20mg of atorvastatin which was an increase from the 10mg that she was taking prior to admission. The writer made a note on her AVS indicating the change. The patient verbalized an understanding of the change and agreed to take medication as requested by MD.    unlisted  1965    per NG: intestinal surgery      Social History    Socioeconomic History      Marital status:       Spouse name: Not on file      Number of children: Not on file      Years of education: Not on file      Highest education level: Not (Patient not taking: Reported on 7/27/2020 ) 6 mL 7   • Multiple Vitamin Oral Tab Take 1 tablet by mouth. • omega-3 fatty acids (FISH OIL) 1000 MG Oral Cap Take 1,000 mg by mouth daily.  90 capsule 3   • aspirin (ASPIRIN ADULT LOW STRENGTH) 81 MG Oral T follow-ups on file.     Orders Placed This Encounter      CBC diff      CMP      Lipase [E]      Amylase [E]      Lipid Panel [E]      Meds This Visit:  Requested Prescriptions      No prescriptions requested or ordered in this encounter       Imaging & Ref

## 2025-02-28 RX ORDER — PANTOPRAZOLE SODIUM 40 MG/1
40 TABLET, DELAYED RELEASE ORAL
Qty: 90 TABLET | Refills: 0 | Status: SHIPPED | OUTPATIENT
Start: 2025-02-28

## 2025-02-28 NOTE — TELEPHONE ENCOUNTER
Requested Prescriptions     Pending Prescriptions Disp Refills    PANTOPRAZOLE 40 MG Oral Tab EC [Pharmacy Med Name: PANTOPRAZOLE 40MG TABLETS] 90 tablet 0     Sig: TAKE 1 TABLET(40 MG) BY MOUTH BEFORE BREAKFAST      Lr: 11/11/2024    Lov: 09/12/2023    Last Colonoscopy 7/3/2018  [facility]

## 2025-02-28 NOTE — TELEPHONE ENCOUNTER
Called patient ,left voicemail ,phone number were provided ,the purpose of this call is for medication has been refilled for now and its ready for  to his Pharmacy,but needs to make a visit follow up and monitoring  with Ami Kim PA-C.

## 2025-03-03 ENCOUNTER — TELEPHONE (OUTPATIENT)
Facility: CLINIC | Age: 60
End: 2025-03-03

## 2025-03-03 NOTE — TELEPHONE ENCOUNTER
Thank you    Your Appointments      Monday April 21, 2025 4:30 PM  Follow Up Visit with Ami Kim PA-C  [facility] [address] [place] [address]  [phone]

## 2025-03-03 NOTE — TELEPHONE ENCOUNTER
Left message to call back.    He was likely calling back to set up appointment because we left him a message that he will need an office visit for further refills of pantoprazole after the one that was sent on 2/28/25.    Ok to offer him any open yellow spot on Ami's schedule.

## 2025-03-08 ENCOUNTER — OFFICE VISIT (OUTPATIENT)
Dept: INTERNAL MEDICINE CLINIC | Facility: CLINIC | Age: 60
End: 2025-03-08
Payer: COMMERCIAL

## 2025-03-08 VITALS
HEART RATE: 60 BPM | WEIGHT: 158 LBS | BODY MASS INDEX: 22.62 KG/M2 | TEMPERATURE: 98 F | SYSTOLIC BLOOD PRESSURE: 116 MMHG | DIASTOLIC BLOOD PRESSURE: 76 MMHG | HEIGHT: 70 IN | RESPIRATION RATE: 18 BRPM

## 2025-03-08 DIAGNOSIS — Z00.00 ROUTINE PHYSICAL EXAMINATION: Primary | ICD-10-CM

## 2025-03-08 DIAGNOSIS — F41.9 ANXIETY: ICD-10-CM

## 2025-03-08 DIAGNOSIS — E78.5 HYPERLIPIDEMIA, UNSPECIFIED HYPERLIPIDEMIA TYPE: ICD-10-CM

## 2025-03-08 DIAGNOSIS — I25.10 CORONARY ARTERY DISEASE INVOLVING NATIVE CORONARY ARTERY OF NATIVE HEART WITHOUT ANGINA PECTORIS: ICD-10-CM

## 2025-03-08 DIAGNOSIS — K92.89 GAS BLOAT SYNDROME: ICD-10-CM

## 2025-03-08 DIAGNOSIS — I10 ESSENTIAL HYPERTENSION: ICD-10-CM

## 2025-03-08 DIAGNOSIS — R73.09 ELEVATED GLUCOSE: ICD-10-CM

## 2025-03-08 NOTE — PROGRESS NOTES
HPI:    Patient ID: Nawshad DO Mart is a 59 year old male.    HPI    Patient returns to the office today requesting general physical exam as well as To discuss chronic medical issues as listed on the active problem list below.  Patient last seen in the office by me on  August 17 of last year.  Blood pressure was borderline elevated.  During the last visit, the following changes were made:  ncrease in carvedilol dosing  up to 6.25 BID  Sugar and A1C levels on 12/15 were 114 and 6.1.  Since the last visit, the patient has seen the following doctors: Podiatry, ENT, Audiology.    Today, the patient offers the following complaints: He is doing pretty good. He has a lot of gas. Patient does check blood pressure at home. It has been running up and down; high as 145/86; but most others are under 140/90. Has nocturia 2-3 times a night. He has a lot of gas- both belching and passing gas from below; he has changed his diet to decrease it. GasX also are helpful. He took the pantoprazole for 2 weeks and stopped. Otherwise he takes it PRN.   Sometimes he has trouble concentrating. No one els has noted. It is not affecting his work. He is not able to multitask like he used to.   Patient describes diet as strict. He really watches what he eats.   For exercise, the patient uses the treadmill- either walking or running. Alos weights and elliptical.   Tobacco and alcohol use reviewed. Uses neither.   Current medications reviewed. Takes the monteleukast and pantoprazole as needed.   Health maintenance issues reviewed.    Wt Readings from Last 6 Encounters:   03/08/25 158 lb (71.7 kg)   11/18/24 154 lb (69.9 kg)   08/17/24 154 lb 6 oz (70 kg)   02/10/24 146 lb (66.2 kg)   08/30/23 140 lb (63.5 kg)   08/04/23 144 lb (65.3 kg)       Patient Active Problem List   Diagnosis    Essential hypertension    Hyperlipidemia    Abnormal findings on diagnostic imaging of cardiovascular system    Coronary artery disease involving native heart with  angina pectoris    Hyperopia with presbyopia of both eyes    Subjective visual disturbance of both eyes    Meibomian gland dysfunction (MGD) of both eyes    Post-inflammatory hyperpigmentation    Pruritic disorder    Nummular eczema    Abdominal bloating    Poikiloderma    CAD (coronary artery disease)    Gas bloat syndrome    COVID-19        HISTORY:  Past Medical History:    Atherosclerosis of coronary artery    Stenting of Left Circumflex and Ramus    Coronary atherosclerosis    Essential hypertension    Heart disease    High blood pressure    High cholesterol    Hyperlipidemia      Past Surgical History:   Procedure Laterality Date    Angioplasty (coronary)  8/2015    Cabg      x 4    Cath drug eluting stent  06/02/2015    Colonoscopy N/A 07/03/2018    Procedure: COLONOSCOPY;  Surgeon: Skinny Shi MD;  Location: [facility] ENDOSCOPY    Incision of pyloric muscle  1967    Skin surgery Left 12/10/2020    epidermal inclusion cyst, lower back      Family History   Problem Relation Age of Onset    Heart Disease Father         premature CAD (cause of death)    Lipids Mother         hyperlipidemia    Stroke Paternal Grandmother         CVA    Diabetes Other         family h/o    Stroke Other         family h/o CVA    Stroke Cousin         CVA    Glaucoma Neg     Macular degeneration Neg       Social History     Socioeconomic History    Marital status:     Number of children: 2   Occupational History    Occupation: DATA  ANALYST     Employer: [employer]   Tobacco Use    Smoking status: Never    Smokeless tobacco: Never   Vaping Use    Vaping status: Never Used   Substance and Sexual Activity    Alcohol use: No    Drug use: No    Sexual activity: Yes     Partners: Female   Other Topics Concern    Caffeine Concern Yes     Comment: coffee,1 cup    Reaction to local anesthetic No    Pt has a pacemaker No    Pt has a defibrillator No     Social Drivers of Health     Food Insecurity: No Food Insecurity (3/8/2025)     NCSS - Food Insecurity     Worried About Running Out of Food in the Last Year: No     Ran Out of Food in the Last Year: No   Transportation Needs: No Transportation Needs (3/8/2025)    NCSS - Transportation     Lack of Transportation: No   Housing Stability: Not At Risk (3/8/2025)    NCSS - Housing/Utilities     Has Housing: Yes     Worried About Losing Housing: No     Unable to Get Utilities: No          Review of Systems          Current Outpatient Medications   Medication Sig Dispense Refill    PANTOPRAZOLE 40 MG Oral Tab EC TAKE 1 TABLET(40 MG) BY MOUTH BEFORE BREAKFAST 90 tablet 0    carvedilol 6.25 MG Oral Tab Take 1 tablet (6.25 mg total) by mouth 2 (two) times daily with meals. 180 tablet 0    Ciclopirox 8 % External Solution Apply topically to affected site daily, wash off once per week 19.8 mL 2    Omega-3 Fatty Acids (FISH OIL OR) Take by mouth.      montelukast 10 MG Oral Tab Take 1 tablet (10 mg total) by mouth daily. 90 tablet 3    Tazarotene 0.1 % External Cream USE AT BEDTIME AS DIRECTED 30 g 3    Fluocin-Hydroquinone-Tretinoin (TRI-LAITH) 0.01-4-0.05 % External Cream Use qohs as directed 30 g 5    triamcinolone 0.1 % External Cream Apply 1 Application topically 2 (two) times daily. To areas of itching/ eczema 80 g 3    saccharomyces boulardii (FLORASTOR) 250 MG Oral Cap Take 1 capsule (250 mg total) by mouth 2 (two) times daily. 60 capsule 0    rosuvastatin 40 MG Oral Tab Take 1 tablet (40 mg total) by mouth nightly. 30 tablet 3    Multiple Vitamin Oral Tab Take 1 tablet by mouth.      aspirin 81 MG Oral Tab EC Take 1 tablet (81 mg total) by mouth daily.       Allergies:Allergies[1]     PHYSICAL EXAM:   /76 (BP Location: Left arm, Patient Position: Sitting, Cuff Size: large)   Pulse 60   Temp 97.8 °F (36.6 °C) (Other)   Resp 18   Ht 5' 10\" (1.778 m)   Wt 158 lb (71.7 kg)   BMI 22.67 kg/m²      Physical Exam  Constitutional:       Appearance: Normal appearance. He is well-developed.    HENT:      Right Ear: Tympanic membrane and ear canal normal.      Left Ear: Tympanic membrane and ear canal normal.      Nose: Nose normal.      Mouth/Throat:      Pharynx: No oropharyngeal exudate or posterior oropharyngeal erythema.   Eyes:      Conjunctiva/sclera: Conjunctivae normal.      Pupils: Pupils are equal, round, and reactive to light.   Neck:      Thyroid: No thyromegaly.      Vascular: No carotid bruit.   Cardiovascular:      Rate and Rhythm: Normal rate and regular rhythm.      Pulses: Normal pulses.      Heart sounds: Normal heart sounds. No murmur heard.  Pulmonary:      Effort: Pulmonary effort is normal.      Breath sounds: Normal breath sounds. No wheezing or rales.   Abdominal:      General: Bowel sounds are normal.      Palpations: Abdomen is soft. There is no mass.      Tenderness: There is no abdominal tenderness.      Hernia: There is no hernia in the left inguinal area.   Genitourinary:     Penis: Normal and circumcised.       Testes: Normal.   Musculoskeletal:      Right lower leg: No edema.      Left lower leg: No edema.   Lymphadenopathy:      Cervical: No cervical adenopathy.   Skin:     General: Skin is warm and dry.      Findings: No rash.   Neurological:      General: No focal deficit present.      Mental Status: He is alert.      Cranial Nerves: No cranial nerve deficit.      Coordination: Coordination normal.   Psychiatric:         Mood and Affect: Mood normal.         Behavior: Behavior normal.         Thought Content: Thought content normal.         Judgment: Judgment normal.                 ASSESSMENT/PLAN:   1. Routine physical examination  Physical exam is unremarkable.  Active issues as below.  Health maintenance issues reviewed.  Encouraged healthy diet and regular exercise.  Maintain healthy body weight.  Will check fasting blood work and contact patient with results.  Follow-up in 6 months.   - CBC With Differential With Platelet; Future  - Comp Metabolic Panel (14);  Future  - Lipid Panel; Future  - TSH W Reflex To Free T4; Future  - PSA Total, Screen; Future  - Hemoglobin A1C; Future    2. Essential hypertension  Well-controlled on current dose of medications.  Continue as is.      3. Hyperlipidemia, unspecified hyperlipidemia type   Check lipids.  Adjust dose of rosuvastatin as needed.  Work on diet and exercise.    4. Elevated glucose  Check blood sugar and A1c.  No history of diabetes.  - Hemoglobin A1C; Future    5. Coronary artery disease involving native coronary artery of native heart without angina pectoris  Asymptomatic.  Continue current treatment and follow-up with cardiology.    6. Anxiety  Reasonably controlled.  On no medications at this time.    7. Gas bloat syndrome  Still with active issues with gas and bloating.  He will be seeing GI department in April.         Meds This Visit:  Requested Prescriptions      No prescriptions requested or ordered in this encounter       Imaging & Referrals:  None         Jose Antonio Purcell MD        [1]   Allergies  Allergen Reactions    Metoprolol OTHER (SEE COMMENTS)    Seasonal OTHER (SEE COMMENTS)     Seasonal spring

## 2025-04-13 ENCOUNTER — LAB ENCOUNTER (OUTPATIENT)
Dept: LAB | Facility: HOSPITAL | Age: 60
End: 2025-04-13
Attending: INTERNAL MEDICINE
Payer: COMMERCIAL

## 2025-04-13 ENCOUNTER — APPOINTMENT (OUTPATIENT)
Dept: GENERAL RADIOLOGY | Facility: HOSPITAL | Age: 60
End: 2025-04-13
Attending: EMERGENCY MEDICINE
Payer: COMMERCIAL

## 2025-04-13 ENCOUNTER — HOSPITAL ENCOUNTER (INPATIENT)
Facility: HOSPITAL | Age: 60
LOS: 2 days | Discharge: HOME | End: 2025-04-15
Attending: EMERGENCY MEDICINE
Payer: COMMERCIAL

## 2025-04-13 DIAGNOSIS — Z00.00 ROUTINE PHYSICAL EXAMINATION: ICD-10-CM

## 2025-04-13 DIAGNOSIS — R50.81 NEUTROPENIC FEVER: Primary | ICD-10-CM

## 2025-04-13 DIAGNOSIS — D70.9 NEUTROPENIC FEVER: Primary | ICD-10-CM

## 2025-04-13 DIAGNOSIS — R73.09 ELEVATED GLUCOSE: ICD-10-CM

## 2025-04-13 LAB
ALBUMIN SERPL-MCNC: 4.3 G/DL (ref 3.2–4.8)
ALBUMIN SERPL-MCNC: 4.3 G/DL (ref 3.2–4.8)
ALBUMIN/GLOB SERPL: 1.8 {RATIO} (ref 1–2)
ALBUMIN/GLOB SERPL: 1.9 {RATIO} (ref 1–2)
ALP LIVER SERPL-CCNC: 57 U/L (ref 45–117)
ALP LIVER SERPL-CCNC: 71 U/L (ref 45–117)
ALT SERPL-CCNC: 24 U/L (ref 10–49)
ALT SERPL-CCNC: 25 U/L (ref 10–49)
ANION GAP SERPL CALC-SCNC: 6 MMOL/L (ref 0–18)
ANION GAP SERPL CALC-SCNC: 7 MMOL/L (ref 0–18)
APTT PPP: 36.2 SECONDS (ref 23–36)
AST SERPL-CCNC: 23 U/L (ref ?–34)
AST SERPL-CCNC: 24 U/L (ref ?–34)
BASOPHILS # BLD AUTO: 0.02 X10(3) UL (ref 0–0.2)
BASOPHILS NFR BLD AUTO: 0.9 %
BILIRUB SERPL-MCNC: 0.7 MG/DL (ref 0.3–1.2)
BILIRUB SERPL-MCNC: 0.7 MG/DL (ref 0.3–1.2)
BILIRUB UR QL: NEGATIVE
BUN BLD-MCNC: 20 MG/DL (ref 9–23)
BUN BLD-MCNC: 25 MG/DL (ref 9–23)
BUN/CREAT SERPL: 20.8 (ref 10–20)
BUN/CREAT SERPL: 23.6 (ref 10–20)
CALCIUM BLD-MCNC: 8.7 MG/DL (ref 8.7–10.4)
CALCIUM BLD-MCNC: 9 MG/DL (ref 8.7–10.4)
CHLORIDE SERPL-SCNC: 109 MMOL/L (ref 98–112)
CHLORIDE SERPL-SCNC: 109 MMOL/L (ref 98–112)
CHOLEST SERPL-MCNC: 102 MG/DL (ref ?–200)
CLARITY UR: CLEAR
CO2 SERPL-SCNC: 25 MMOL/L (ref 21–32)
CO2 SERPL-SCNC: 25 MMOL/L (ref 21–32)
COMPLEXED PSA SERPL-MCNC: 0.54 NG/ML (ref ?–4)
CREAT BLD-MCNC: 0.96 MG/DL (ref 0.7–1.3)
CREAT BLD-MCNC: 1.06 MG/DL (ref 0.7–1.3)
DEPRECATED RDW RBC AUTO: 45.7 FL (ref 35.1–46.3)
EGFRCR SERPLBLD CKD-EPI 2021: 82 ML/MIN/1.73M2 (ref 60–?)
EGFRCR SERPLBLD CKD-EPI 2021: 92 ML/MIN/1.73M2 (ref 60–?)
EOSINOPHIL # BLD AUTO: 0.08 X10(3) UL (ref 0–0.7)
EOSINOPHIL NFR BLD AUTO: 3.6 %
ERYTHROCYTE [DISTWIDTH] IN BLOOD BY AUTOMATED COUNT: 13.5 % (ref 11–15)
EST. AVERAGE GLUCOSE BLD GHB EST-MCNC: 126 MG/DL (ref 68–126)
FASTING PATIENT LIPID ANSWER: YES
FASTING STATUS PATIENT QL REPORTED: YES
GLOBULIN PLAS-MCNC: 2.3 G/DL (ref 2–3.5)
GLOBULIN PLAS-MCNC: 2.4 G/DL (ref 2–3.5)
GLUCOSE BLD-MCNC: 116 MG/DL (ref 70–99)
GLUCOSE BLD-MCNC: 155 MG/DL (ref 70–99)
GLUCOSE UR-MCNC: NORMAL MG/DL
HBA1C MFR BLD: 6 % (ref ?–5.7)
HCT VFR BLD AUTO: 44.3 % (ref 39–53)
HDLC SERPL-MCNC: 34 MG/DL (ref 40–59)
HGB BLD-MCNC: 14.9 G/DL (ref 13–17.5)
HGB UR QL STRIP.AUTO: NEGATIVE
IMM GRANULOCYTES # BLD AUTO: 0 X10(3) UL (ref 0–1)
IMM GRANULOCYTES NFR BLD: 0 %
INR BLD: 1.07 (ref 0.8–1.2)
KETONES UR-MCNC: NEGATIVE MG/DL
LACTATE SERPL-SCNC: 1.5 MMOL/L (ref 0.5–2)
LDLC SERPL CALC-MCNC: 53 MG/DL (ref ?–100)
LEUKOCYTE ESTERASE UR QL STRIP.AUTO: NEGATIVE
LYMPHOCYTES # BLD AUTO: 1.06 X10(3) UL (ref 1–4)
LYMPHOCYTES NFR BLD AUTO: 48.2 %
MCH RBC QN AUTO: 30.6 PG (ref 26–34)
MCHC RBC AUTO-ENTMCNC: 33.6 G/DL (ref 31–37)
MCV RBC AUTO: 91 FL (ref 80–100)
MONOCYTES # BLD AUTO: 0.95 X10(3) UL (ref 0.1–1)
MONOCYTES NFR BLD AUTO: 43.2 %
NEUTROPHILS # BLD AUTO: 0.09 X10 (3) UL (ref 1.5–7.7)
NEUTROPHILS # BLD AUTO: 0.09 X10(3) UL (ref 1.5–7.7)
NEUTROPHILS NFR BLD AUTO: 4.1 %
NITRITE UR QL STRIP.AUTO: NEGATIVE
NONHDLC SERPL-MCNC: 68 MG/DL (ref ?–130)
OSMOLALITY SERPL CALC.SUM OF ELEC: 296 MOSM/KG (ref 275–295)
OSMOLALITY SERPL CALC.SUM OF ELEC: 298 MOSM/KG (ref 275–295)
PH UR: 6.5 [PH] (ref 5–8)
PLATELET # BLD AUTO: 157 10(3)UL (ref 150–450)
PLATELETS.RETICULATED NFR BLD AUTO: 3.6 % (ref 0–7)
POTASSIUM SERPL-SCNC: 4 MMOL/L (ref 3.5–5.1)
POTASSIUM SERPL-SCNC: 4.4 MMOL/L (ref 3.5–5.1)
PROCALCITONIN SERPL-MCNC: 0.07 NG/ML (ref ?–0.05)
PROT SERPL-MCNC: 6.6 G/DL (ref 5.7–8.2)
PROT SERPL-MCNC: 6.7 G/DL (ref 5.7–8.2)
PROT UR-MCNC: NEGATIVE MG/DL
PROTHROMBIN TIME: 14.5 SECONDS (ref 11.6–14.8)
RBC # BLD AUTO: 4.87 X10(6)UL (ref 4.3–5.7)
SODIUM SERPL-SCNC: 140 MMOL/L (ref 136–145)
SODIUM SERPL-SCNC: 141 MMOL/L (ref 136–145)
SP GR UR STRIP: 1.02 (ref 1–1.03)
TRIGL SERPL-MCNC: 68 MG/DL (ref 30–149)
TROPONIN I SERPL HS-MCNC: <3 NG/L (ref ?–53)
TSI SER-ACNC: 0.95 UIU/ML (ref 0.55–4.78)
UROBILINOGEN UR STRIP-ACNC: NORMAL
VLDLC SERPL CALC-MCNC: 10 MG/DL (ref 0–30)
WBC # BLD AUTO: 2.2 X10(3) UL (ref 4–11)

## 2025-04-13 PROCEDURE — 80061 LIPID PANEL: CPT

## 2025-04-13 PROCEDURE — 99223 1ST HOSP IP/OBS HIGH 75: CPT | Performed by: HOSPITALIST

## 2025-04-13 PROCEDURE — 80053 COMPREHEN METABOLIC PANEL: CPT

## 2025-04-13 PROCEDURE — 85007 BL SMEAR W/DIFF WBC COUNT: CPT

## 2025-04-13 PROCEDURE — 85025 COMPLETE CBC W/AUTO DIFF WBC: CPT

## 2025-04-13 PROCEDURE — 71045 X-RAY EXAM CHEST 1 VIEW: CPT | Performed by: EMERGENCY MEDICINE

## 2025-04-13 PROCEDURE — 36415 COLL VENOUS BLD VENIPUNCTURE: CPT

## 2025-04-13 PROCEDURE — 85060 BLOOD SMEAR INTERPRETATION: CPT

## 2025-04-13 PROCEDURE — 83036 HEMOGLOBIN GLYCOSYLATED A1C: CPT

## 2025-04-13 PROCEDURE — 85027 COMPLETE CBC AUTOMATED: CPT

## 2025-04-13 PROCEDURE — 84443 ASSAY THYROID STIM HORMONE: CPT

## 2025-04-13 RX ORDER — ACETAMINOPHEN 500 MG
500 TABLET ORAL EVERY 4 HOURS PRN
Status: DISCONTINUED | OUTPATIENT
Start: 2025-04-13 | End: 2025-04-15

## 2025-04-13 RX ORDER — PANTOPRAZOLE SODIUM 40 MG/1
40 TABLET, DELAYED RELEASE ORAL
Status: DISCONTINUED | OUTPATIENT
Start: 2025-04-14 | End: 2025-04-15

## 2025-04-13 RX ORDER — SENNOSIDES 8.6 MG
17.2 TABLET ORAL NIGHTLY PRN
Status: DISCONTINUED | OUTPATIENT
Start: 2025-04-13 | End: 2025-04-15

## 2025-04-13 RX ORDER — SODIUM PHOSPHATE, DIBASIC AND SODIUM PHOSPHATE, MONOBASIC 7; 19 G/230ML; G/230ML
1 ENEMA RECTAL ONCE AS NEEDED
Status: DISCONTINUED | OUTPATIENT
Start: 2025-04-13 | End: 2025-04-15

## 2025-04-13 RX ORDER — MONTELUKAST SODIUM 10 MG/1
10 TABLET ORAL DAILY
Status: DISCONTINUED | OUTPATIENT
Start: 2025-04-13 | End: 2025-04-15

## 2025-04-13 RX ORDER — ENOXAPARIN SODIUM 100 MG/ML
40 INJECTION SUBCUTANEOUS NIGHTLY
Status: DISCONTINUED | OUTPATIENT
Start: 2025-04-13 | End: 2025-04-15

## 2025-04-13 RX ORDER — ROSUVASTATIN CALCIUM 20 MG/1
40 TABLET, COATED ORAL NIGHTLY
Status: DISCONTINUED | OUTPATIENT
Start: 2025-04-13 | End: 2025-04-15

## 2025-04-13 RX ORDER — CARVEDILOL 6.25 MG/1
6.25 TABLET ORAL 2 TIMES DAILY WITH MEALS
Status: DISCONTINUED | OUTPATIENT
Start: 2025-04-13 | End: 2025-04-15

## 2025-04-13 RX ORDER — ONDANSETRON 2 MG/ML
4 INJECTION INTRAMUSCULAR; INTRAVENOUS EVERY 6 HOURS PRN
Status: DISCONTINUED | OUTPATIENT
Start: 2025-04-13 | End: 2025-04-15

## 2025-04-13 RX ORDER — PROCHLORPERAZINE EDISYLATE 5 MG/ML
5 INJECTION INTRAMUSCULAR; INTRAVENOUS EVERY 8 HOURS PRN
Status: DISCONTINUED | OUTPATIENT
Start: 2025-04-13 | End: 2025-04-15

## 2025-04-13 RX ORDER — POLYETHYLENE GLYCOL 3350 17 G/17G
17 POWDER, FOR SOLUTION ORAL DAILY PRN
Status: DISCONTINUED | OUTPATIENT
Start: 2025-04-13 | End: 2025-04-15

## 2025-04-13 RX ORDER — BISACODYL 10 MG
10 SUPPOSITORY, RECTAL RECTAL
Status: DISCONTINUED | OUTPATIENT
Start: 2025-04-13 | End: 2025-04-15

## 2025-04-13 RX ORDER — ASPIRIN 81 MG/1
81 TABLET ORAL DAILY
Status: DISCONTINUED | OUTPATIENT
Start: 2025-04-14 | End: 2025-04-15

## 2025-04-13 NOTE — PLAN OF CARE
Admitted to 466 from ED. Oriented to room and safety precautions. A/O x 4. Reports intermittent cough and fever x 10 days, generalized weakness. Cefepime per orders. Tolerating general diet. Voiding freely. Up ad gabriel. Denies pain. Bed in lowest position, call light in reach, frequent rounding, nonskid footwear, fall precautions in place.

## 2025-04-13 NOTE — ED QUICK NOTES
Orders for admission, patient is aware of plan and ready to go upstairs. Any questions, please call ED RN annmarie at extension [phone].     Patient Covid vaccination status: Fully vaccinated     COVID Test Ordered in ED: None    COVID Suspicion at Admission: N/A    Running Infusions: Medication Infusions[1] None    Mental Status/LOC at time of transport: axox4    Other pertinent information:   CIWA score: N/A   NIH score:  N/A             [1]

## 2025-04-13 NOTE — ED PROVIDER NOTES
Patient Seen in: [facility] Emergency Department    History     Chief Complaint   Patient presents with    Abnormal Result     Stated Complaint: Abnormal Labs, fever    Subjective:   HPI      59-year-old male history of CAD, hypertension, hyperlipidemia presents with cough, fever, abnormal labs.  Patient reports 10 days of chills, body aches, cough.  Had labs drawn and was told to come to ER for low white blood cell count.  Denies nausea, vomiting, chest pain, shortness of breath, headache    Objective:     Past Medical History:    Atherosclerosis of coronary artery    Stenting of Left Circumflex and Ramus    Coronary atherosclerosis    Essential hypertension    Heart disease    High blood pressure    High cholesterol    Hyperlipidemia              Past Surgical History:   Procedure Laterality Date    Angioplasty (coronary)  8/2015    Cabg      x 4    Cath drug eluting stent  06/02/2015    Colonoscopy N/A 07/03/2018    Procedure: COLONOSCOPY;  Surgeon: Skinny Shi MD;  Location: [facility] ENDOSCOPY    Incision of pyloric muscle  1967    Skin surgery Left 12/10/2020    epidermal inclusion cyst, lower back                Social History     Socioeconomic History    Marital status:     Number of children: 2   Occupational History    Occupation: DATA  ANALYST     Employer: [employer]   Tobacco Use    Smoking status: Never    Smokeless tobacco: Never   Vaping Use    Vaping status: Never Used   Substance and Sexual Activity    Alcohol use: No    Drug use: No    Sexual activity: Yes     Partners: Female   Other Topics Concern    Caffeine Concern Yes     Comment: coffee,1 cup    Reaction to local anesthetic No    Pt has a pacemaker No    Pt has a defibrillator No     Social Drivers of Health     Food Insecurity: No Food Insecurity (3/8/2025)    NCSS - Food Insecurity     Worried About Running Out of Food in the Last Year: No     Ran Out of Food in the Last Year: No   Transportation Needs: No Transportation  Needs (3/8/2025)    NCSS - Transportation     Lack of Transportation: No   Housing Stability: Not At Risk (3/8/2025)    NCSS - Housing/Utilities     Has Housing: Yes     Worried About Losing Housing: No     Unable to Get Utilities: No                  Physical Exam     ED Triage Vitals [04/13/25 1357]   BP (!) 164/107   Pulse 79   Resp 18   Temp 97.6 °F (36.4 °C)   Temp src Temporal   SpO2 97 %   O2 Device None (Room air)       Current Vitals:   Vital Signs  BP: 128/84  Pulse: 74  Resp: 18  Temp: 97.6 °F (36.4 °C)  Temp src: Temporal  MAP (mmHg): 100    Oxygen Therapy  SpO2: 96 %  O2 Device: None (Room air)        Physical Exam  Vital signs reviewed. Nursing note reviewed.  Constitutional: Well-developed. Well-nourished. In no acute distress  HENT: Mucous membranes moist.   EYES: No scleral icterus or conjunctival injection.  NECK: Full ROM. Supple.   CARDIAC: Normal rate. Normal S1/ S2. 2+ distal pulses. No edema  PULM/CHEST: Clear to auscultation bilaterally. No wheezes  ABD: Soft, non-tender, non-distended.   RECTAL: deferred  Extremities: No obvious deformity  NEURO: Awake, alert, following commands, moving extremities, answering questions.   SKIN: Warm and dry. No rash or lesions.  PSYCH: Normal judgment. Normal affect.        ED Course     Labs Reviewed   CBC WITH DIFFERENTIAL WITH PLATELET - Abnormal; Notable for the following components:       Result Value    WBC 2.2 (*)     Neutrophil Absolute Prelim 0.09 (*)     Neutrophil Absolute 0.09 (*)     All other components within normal limits   COMP METABOLIC PANEL (14) - Abnormal; Notable for the following components:    Glucose 155 (*)     BUN 25 (*)     BUN/CREA Ratio 23.6 (*)     Calculated Osmolality 298 (*)     All other components within normal limits   PTT, ACTIVATED - Abnormal; Notable for the following components:    PTT 36.2 (*)     All other components within normal limits   PROCALCITONIN - Abnormal; Notable for the following components:     Procalcitonin 0.07 (*)     All other components within normal limits   TROPONIN I HIGH SENSITIVITY - Normal   LACTIC ACID, PLASMA - Normal   PROTHROMBIN TIME (PT) - Normal   SCAN SLIDE   URINALYSIS WITH CULTURE REFLEX   RAINBOW DRAW LAVENDER   RAINBOW DRAW LIGHT GREEN   RAINBOW DRAW BLUE   RAINBOW DRAW GOLD   BLOOD CULTURE   BLOOD CULTURE     EKG    Rate, intervals and axes as noted on EKG Report.  Rate: 79  Rhythm: Sinus Rhythm  Reading: No ectopy                                     MDM      Assessment:Patient is a 59 year old male presenting to the ED due to fever, abnormal labs.    Comorbidities/chronic illnesses impacting care: HTN, CAD    History obtained from: patient    External records and previous hospitalization records reviewed and documented below    Consideration of Social Determinants of Health and Impact on Medical Decision Making:  Housing/Transportation/Financial Strain/Access to healthcare/Food insecurity/family or Community support/Language and Literacy/Substance abuse/Mental health concerns/Disabilities     -none    Radiography/Imaging:  XR CHEST AP PORTABLE  (CPT=71045)   Final Result   PROCEDURE: XR CHEST AP PORTABLE  (CPT=71045)   TIME: 1534 hours       COMPARISON: [facility], XR CHEST PA + LAT CHEST    (CPT=71046), 3/27/2022, 7:45 AM.       INDICATIONS: Abnormal Labs, fever       TECHNIQUE:   Single view.         FINDINGS:    CARDIAC/VASC: Post coronary artery bypass. Heart size and pulmonary    vascularity normal.    MEDIAST/AIYANA:   No visible mass or adenopathy.   LUNGS/PLEURA: No consolidation or pleural effusion.   BONES: Post sternotomy.   OTHER: Negative.                     =====   CONCLUSION:    1. No acute cardiopulmonary finding.               Dictated by (CST): Marty Gross MD on 4/13/2025 at 3:47 PM        Finalized by (CST): Marty Gross MD on 4/13/2025 at 3:47 PM                       ED course  Patient arrives to ER hypertensive.  Has had viral URI type  symptoms for the last 10 days, though with night sweats.  No recent travel.  Reviewed his labs done earlier today, shows neutropenia.  His repeat absolute neutrophil count is just about 200 here.  Will discuss with hematology, give empiric antibiotics, cultures, possibly plan for admission    Laboratory results above were independently viewed and interpreted as: Neutropenia, normal renal function  Radiology: ordered and independently interpreted as: Chest x-ray negative for infiltrate    In discussion with hematology, likely viral induced neutropenia, but will cover with antibiotics, plan for admission and their consult    Time spent providing Critical Care Services to the patient (excluding time spent performing separately billable procedures): 35 minutes.  Most of the time was spent at the bedside of the patient, reevaluating the patient and vital signs, explaining conditions and results to the patient and/or family, as well as speaking to the PMD and/or Consultant(s).              Medications   ceFEPIme (Maxipime) 2 g in sodium chloride 0.9% 100mL IVPB-ANGEL (2 g Intravenous New Bag 4/13/25 1543)             Admission disposition: 4/13/2025  3:35 PM           Medical Decision Making      Disposition and Plan     Clinical Impression:  1. Neutropenic fever         Disposition:  Admit  4/13/2025  3:35 pm    Follow-up:  No follow-up provider specified.        Medications Prescribed:  Current Discharge Medication List          Supplementary Documentation:         Hospital Problems       Present on Admission  Date Reviewed: 3/8/2025          ICD-10-CM Noted POA    Neutropenic fever D70.9, R50.81 4/13/2025 Unknown                                                                      no

## 2025-04-13 NOTE — H&P
[facility]  part of [facility]    History & Physical    Wendi Mart Patient Status:  Emergency    1965 MRN [id number]   Location [facility] EMERGENCY DEPARTMENT Attending Doyle Jones MD   Hosp Day # 0 PCP Jose Antonio Purcell MD     Date:  2025  Date of Admission:  2025    History provided by:patient  Chief Complaint:     Chief Complaint   Patient presents with    Abnormal Result       HPI:   Wendi Mart is a 59 year old with a history of CAD, HTN and HLD, who presents with cough, fever, and abnormal labs. Patient has been in a good health until about 10 days ago, when he developed dry cough, subjective fevers, chills, body aches and malaise. There was no chest pain, abdominal pain, emesis, diarrhea, bruising, rash or joint swelling. He went to doctor's office earlier today. Blood work showed low WBC. He was then sent to hospital. In ED, patient has stable vitals. Blood work showed ANC 90, procal 0.07. Blood culture obtained. CXR showed no acute cardiopulmonary finding.Cefepime given. Hematology consulted.    History   Past Medical History[1]  Past Surgical History[2]  Family History[3]  Social History:  Short Social Hx on File[4]  Allergies/Medications:   Allergies: Allergies[5]  Prescriptions Prior to Admission[6]    Review of Systems:   Negative except depicted in HPI.    A comprehensive 12 point review of systems was completed.  Pertinent positives and negatives noted in the the HPI.    Physical Exam:   Vital Signs:  Blood pressure (!) 164/107, pulse 79, temperature 97.6 °F (36.4 °C), temperature source Temporal, resp. rate 18, height 5' 10\" (1.778 m), weight 150 lb (68 kg), SpO2 97%.     GENERAL:  The patient appeared to be in no distress.    SKIN:  Warm and hydrated  HEENT:  Head was atraumatic and normocephalic.  Eyes:  Extraocular muscles were intact.  Sclera was anicteric.  Pupils were equally reactive to light.  Ears:  There were no lesions.   Nose:  No lesions were noted.   NECK:  Supple.  There was no JVD.   CHEST:  Symmetrical movement on inspiration  HEART:  S1 and S2 heard.  RRR   LUNGS:  Air entry was good.  No crackles or wheezes   ABDOMEN: Soft and non-tender.  Bowel sounds were present.  MUSCULOSKELETAL:  There was no deformity.  There was full range of motion in all the extremities.   EXTREMITIES: There was no edema, clubbing or cyanosis  NEUROLOGICAL:  There was no focal deficit.  Cranial nerves II through XII were intact.  PSYCHIATRIC: Calm and cooperative     Results:     Lab Results   Component Value Date    WBC 2.2 (L) 04/13/2025    HGB 14.9 04/13/2025    HCT 44.3 04/13/2025    .0 04/13/2025    CREATSERUM 1.06 04/13/2025    BUN 25 (H) 04/13/2025     04/13/2025    K 4.4 04/13/2025     04/13/2025    CO2 25.0 04/13/2025     (H) 04/13/2025    CA 9.0 04/13/2025    ALB 4.3 04/13/2025    ALKPHO 71 04/13/2025    BILT 0.7 04/13/2025    TP 6.7 04/13/2025    AST 24 04/13/2025    ALT 25 04/13/2025    PTT 36.2 (H) 04/13/2025    INR 1.07 04/13/2025    TSH 0.955 04/13/2025     05/16/2019    PSA 0.6 12/13/2017    DDIMER 0.65 (H) 03/24/2022    ESRML 86 (H) 03/28/2022    MG 2.0 03/28/2022    TROP <0.045 05/16/2019    B12 1,010 (H) 02/11/2024       No results found.  EKG  Result Date: 4/13/2025  Normal sinus rhythm Nonspecific T wave abnormality Abnormal ECG No previous ECGs found in [facility]      Assessment/Plan:   Wendi Mart is a 59 year old with a history of CAD, HTN and HLD, who presents with cough, fever, and abnormal labs.     # Neutropenia  - ANC 90 on arrival, normal plt and hgb, likely due to an unspecified viral infection.  - Blood culture obtained  - prophylactic cefepime  - Hematology consulted    # CAD  # HTN  # HLD  - stable  - continue home meds    Diet: regular  PT/OT: deferred  DVT ppx: Lovenox  Line: none  Code status: Full  Dispo: expect greater than 2 midnights    MDM: high Yaniv Crump MD,  PhD  Message via Secure Chat  [facility] Hospitalist         [1]   Past Medical History:   Atherosclerosis of coronary artery    Stenting of Left Circumflex and Ramus    Coronary atherosclerosis    Essential hypertension    Heart disease    High blood pressure    High cholesterol    Hyperlipidemia   [2]   Past Surgical History:  Procedure Laterality Date    Angioplasty (coronary)  8/2015    Cabg      x 4    Cath drug eluting stent  06/02/2015    Colonoscopy N/A 07/03/2018    Procedure: COLONOSCOPY;  Surgeon: Skinny Shi MD;  Location: [facility] ENDOSCOPY    Incision of pyloric muscle  1967    Skin surgery Left 12/10/2020    epidermal inclusion cyst, lower back   [3]   Family History  Problem Relation Age of Onset    Heart Disease Father         premature CAD (cause of death)    Lipids Mother         hyperlipidemia    Stroke Paternal Grandmother         CVA    Diabetes Other         family h/o    Stroke Other         family h/o CVA    Stroke Cousin         CVA    Glaucoma Neg     Macular degeneration Neg    [4]   Social History  Socioeconomic History    Marital status:     Number of children: 2   Occupational History    Occupation: DATA  ANALYST     Employer: [employer]   Tobacco Use    Smoking status: Never    Smokeless tobacco: Never   Vaping Use    Vaping status: Never Used   Substance and Sexual Activity    Alcohol use: No    Drug use: No    Sexual activity: Yes     Partners: Female   Other Topics Concern    Caffeine Concern Yes     Comment: coffee,1 cup    Reaction to local anesthetic No    Pt has a pacemaker No    Pt has a defibrillator No     Social Drivers of Health     Food Insecurity: No Food Insecurity (3/8/2025)    NCSS - Food Insecurity     Worried About Running Out of Food in the Last Year: No     Ran Out of Food in the Last Year: No   Transportation Needs: No Transportation Needs (3/8/2025)    NCSS - Transportation     Lack of Transportation: No   Housing Stability: Not At Risk  (3/8/2025)    NCSS - Housing/Utilities     Has Housing: Yes     Worried About Losing Housing: No     Unable to Get Utilities: No   [5]   Allergies  Allergen Reactions    Seasonal OTHER (SEE COMMENTS)     Seasonal spring   [6] (Not in a hospital admission)

## 2025-04-13 NOTE — ED INITIAL ASSESSMENT (HPI)
Pt to ER c/o low WBC following doctor visit this morning, pt c/o fever and ringing in ears x multiple days.

## 2025-04-14 LAB
ADENOVIRUS PCR:: NOT DETECTED
ANION GAP SERPL CALC-SCNC: 6 MMOL/L (ref 0–18)
ATRIAL RATE: 79 BPM
B PARAPERT DNA SPEC QL NAA+PROBE: NOT DETECTED
B PERT DNA SPEC QL NAA+PROBE: NOT DETECTED
BASOPHILS # BLD AUTO: 0.02 X10(3) UL (ref 0–0.2)
BASOPHILS # BLD: 0 X10(3) UL (ref 0–0.2)
BASOPHILS NFR BLD AUTO: 1.1 %
BASOPHILS NFR BLD: 0 %
BUN BLD-MCNC: 22 MG/DL (ref 9–23)
BUN/CREAT SERPL: 23.2 (ref 10–20)
C PNEUM DNA SPEC QL NAA+PROBE: NOT DETECTED
CALCIUM BLD-MCNC: 8.3 MG/DL (ref 8.7–10.4)
CHLORIDE SERPL-SCNC: 108 MMOL/L (ref 98–112)
CO2 SERPL-SCNC: 26 MMOL/L (ref 21–32)
CORONAVIRUS 229E PCR:: NOT DETECTED
CORONAVIRUS HKU1 PCR:: NOT DETECTED
CORONAVIRUS NL63 PCR:: NOT DETECTED
CORONAVIRUS OC43 PCR:: NOT DETECTED
CREAT BLD-MCNC: 0.95 MG/DL (ref 0.7–1.3)
DEPRECATED RDW RBC AUTO: 44.9 FL (ref 35.1–46.3)
DEPRECATED RDW RBC AUTO: 46.5 FL (ref 35.1–46.3)
EGFRCR SERPLBLD CKD-EPI 2021: 93 ML/MIN/1.73M2 (ref 60–?)
EOSINOPHIL # BLD AUTO: 0.04 X10(3) UL (ref 0–0.7)
EOSINOPHIL # BLD: 0.02 X10(3) UL (ref 0–0.7)
EOSINOPHIL NFR BLD AUTO: 2.2 %
EOSINOPHIL NFR BLD: 1 %
ERYTHROCYTE [DISTWIDTH] IN BLOOD BY AUTOMATED COUNT: 13.5 % (ref 11–15)
ERYTHROCYTE [DISTWIDTH] IN BLOOD BY AUTOMATED COUNT: 13.9 % (ref 11–15)
FLUAV RNA SPEC QL NAA+PROBE: NOT DETECTED
FLUBV RNA SPEC QL NAA+PROBE: NOT DETECTED
GLUCOSE BLD-MCNC: 136 MG/DL (ref 70–99)
HCT VFR BLD AUTO: 41.7 % (ref 39–53)
HCT VFR BLD AUTO: 43.6 % (ref 39–53)
HGB BLD-MCNC: 13.5 G/DL (ref 13–17.5)
HGB BLD-MCNC: 14.6 G/DL (ref 13–17.5)
IMM GRANULOCYTES # BLD AUTO: 0.03 X10(3) UL (ref 0–1)
IMM GRANULOCYTES NFR BLD: 1.7 %
LYMPHOCYTES # BLD AUTO: 0.79 X10(3) UL (ref 1–4)
LYMPHOCYTES NFR BLD AUTO: 44.1 %
LYMPHOCYTES NFR BLD: 1.01 X10(3) UL (ref 1–4)
LYMPHOCYTES NFR BLD: 40 %
MCH RBC QN AUTO: 29.4 PG (ref 26–34)
MCH RBC QN AUTO: 30.2 PG (ref 26–34)
MCHC RBC AUTO-ENTMCNC: 32.4 G/DL (ref 31–37)
MCHC RBC AUTO-ENTMCNC: 33.5 G/DL (ref 31–37)
MCV RBC AUTO: 90.3 FL (ref 80–100)
MCV RBC AUTO: 90.8 FL (ref 80–100)
METAPNEUMOVIRUS PCR:: NOT DETECTED
MONOCYTES # BLD AUTO: 0.65 X10(3) UL (ref 0.1–1)
MONOCYTES # BLD: 1.06 X10(3) UL (ref 0.1–1)
MONOCYTES NFR BLD AUTO: 36.3 %
MONOCYTES NFR BLD: 44 %
MORPHOLOGY: NORMAL
MYCOPLASMA PNEUMONIA PCR:: NOT DETECTED
NEUTROPHILS # BLD AUTO: 0.26 X10 (3) UL (ref 1.5–7.7)
NEUTROPHILS # BLD AUTO: 0.26 X10(3) UL (ref 1.5–7.7)
NEUTROPHILS # BLD AUTO: 0.29 X10 (3) UL (ref 1.5–7.7)
NEUTROPHILS NFR BLD AUTO: 14.6 %
NEUTROPHILS NFR BLD: 11 %
NEUTS BAND NFR BLD: 2 %
NEUTS HYPERSEG # BLD: 0.31 X10(3) UL (ref 1.5–7.7)
OSMOLALITY SERPL CALC.SUM OF ELEC: 295 MOSM/KG (ref 275–295)
P AXIS: 21 DEGREES
P-R INTERVAL: 162 MS
PARAINFLUENZA 1 PCR:: NOT DETECTED
PARAINFLUENZA 2 PCR:: NOT DETECTED
PARAINFLUENZA 3 PCR:: NOT DETECTED
PARAINFLUENZA 4 PCR:: NOT DETECTED
PLATELET # BLD AUTO: 132 10(3)UL (ref 150–450)
PLATELET # BLD AUTO: 135 10(3)UL (ref 150–450)
PLATELET MORPHOLOGY: NORMAL
PLATELETS.RETICULATED NFR BLD AUTO: 4.3 % (ref 0–7)
POTASSIUM SERPL-SCNC: 4 MMOL/L (ref 3.5–5.1)
Q-T INTERVAL: 368 MS
QRS DURATION: 90 MS
QTC CALCULATION (BEZET): 421 MS
R AXIS: 16 DEGREES
RBC # BLD AUTO: 4.59 X10(6)UL (ref 4.3–5.7)
RBC # BLD AUTO: 4.83 X10(6)UL (ref 4.3–5.7)
RHINOVIRUS/ENTERO PCR:: NOT DETECTED
RSV RNA SPEC QL NAA+PROBE: NOT DETECTED
SARS-COV-2 RNA NPH QL NAA+NON-PROBE: NOT DETECTED
SODIUM SERPL-SCNC: 140 MMOL/L (ref 136–145)
T AXIS: -13 DEGREES
TOTAL CELLS COUNTED BLD: 100
VARIANT LYMPHS NFR BLD MANUAL: 2 % (ref ?–4)
VENTRICULAR RATE: 79 BPM
WBC # BLD AUTO: 1.8 X10(3) UL (ref 4–11)
WBC # BLD AUTO: 2.4 X10(3) UL (ref 4–11)

## 2025-04-14 PROCEDURE — 99222 1ST HOSP IP/OBS MODERATE 55: CPT | Performed by: STUDENT IN AN ORGANIZED HEALTH CARE EDUCATION/TRAINING PROGRAM

## 2025-04-14 PROCEDURE — 99233 SBSQ HOSP IP/OBS HIGH 50: CPT | Performed by: HOSPITALIST

## 2025-04-14 NOTE — PLAN OF CARE
Patient is independent. No c/o pain. Voiding WNL. VSS, afebrile. Good appetite. Family at bedside. Safety plan in place. Plan is probable home tomorrow.     Problem: Patient Centered Care  Goal: Patient preferences are identified and integrated in the patient's plan of care  Description: Interventions:- What would you like us to know as we care for you? I live with my wife and 2 kids  - Provide timely, complete, and accurate information to patient/family- Incorporate patient and family knowledge, values, beliefs, and cultural backgrounds into the planning and delivery of care- Encourage patient/family to participate in care and decision-making at the level they choose- Honor patient and family perspectives and choices  Outcome: Progressing     Problem: RISK FOR INFECTION - ADULT  Goal: Absence of fever/infection during anticipated neutropenic period  Description: INTERVENTIONS- Monitor WBC- Administer growth factors as ordered- Implement neutropenic guidelines  Outcome: Progressing     Problem: SAFETY ADULT - FALL  Goal: Free from fall injury  Description: INTERVENTIONS:- Assess pt frequently for physical needs- Identify cognitive and physical deficits and behaviors that affect risk of falls.- [place] fall precautions as indicated by assessment.- Educate pt/family on patient safety including physical limitations- Instruct pt to call for assistance with activity based on assessment- Modify environment to reduce risk of injury- Provide assistive devices as appropriate- Consider OT/PT consult to assist with strengthening/mobility- Encourage toileting schedule  Outcome: Progressing     Problem: METABOLIC/FLUID AND ELECTROLYTES - ADULT  Goal: Electrolytes maintained within normal limits  Description: INTERVENTIONS:- Monitor labs and rhythm and assess patient for signs and symptoms of electrolyte imbalances- Administer electrolyte replacement as ordered- Monitor response to electrolyte replacements, including rhythm and  repeat lab results as appropriate- Fluid restriction as ordered- Instruct patient on fluid and nutrition restrictions as appropriate  Outcome: Progressing  Goal: Hemodynamic stability and optimal renal function maintained  Description: INTERVENTIONS:- Monitor labs and assess for signs and symptoms of volume excess or deficit- Monitor intake, output and patient weight- Monitor urine specific gravity, serum osmolarity and serum sodium as indicated or ordered- Monitor response to interventions for patient's volume status, including labs, urine output, blood pressure (other measures as available)- Encourage oral intake as appropriate- Instruct patient on fluid and nutrition restrictions as appropriate  Outcome: Progressing

## 2025-04-14 NOTE — PAYOR COMM NOTE
--------------  ADMISSION REVIEW     Payor: [employer]  Subscriber #:  [id number]  Authorization Number: [id number]    Admit date: 4/13/25  Admit time:  4:59 PM       REVIEW DOCUMENTATION:     ED Provider Notes        ED Provider Notes signed by Doyle Jones MD at 4/13/2025  4:15 PM        Patient Seen in: [facility] Emergency Department    History     Chief Complaint   Patient presents with    Abnormal Result     Stated Complaint: Abnormal Labs, fever    Subjective:   HPI      59-year-old male history of CAD, hypertension, hyperlipidemia presents with cough, fever, abnormal labs.  Patient reports 10 days of chills, body aches, cough.  Had labs drawn and was told to come to ER for low white blood cell count.  Denies nausea, vomiting, chest pain, shortness of breath, headache    Objective:     Past Medical History:    Atherosclerosis of coronary artery    Stenting of Left Circumflex and Ramus    Coronary atherosclerosis    Essential hypertension    Heart disease    High blood pressure    High cholesterol    Hyperlipidemia       Physical Exam     ED Triage Vitals [04/13/25 1357]   BP (!) 164/107   Pulse 79   Resp 18   Temp 97.6 °F (36.4 °C)   Temp src Temporal   SpO2 97 %   O2 Device None (Room air)       Current Vitals:   Vital Signs  BP: 128/84  Pulse: 74  Resp: 18  Temp: 97.6 °F (36.4 °C)  Temp src: Temporal  MAP (mmHg): 100    Oxygen Therapy  SpO2: 96 %  O2 Device: None (Room air)        Physical Exam  Vital signs reviewed. Nursing note reviewed.  Constitutional: Well-developed. Well-nourished. In no acute distress  HENT: Mucous membranes moist.   EYES: No scleral icterus or conjunctival injection.  NECK: Full ROM. Supple.   CARDIAC: Normal rate. Normal S1/ S2. 2+ distal pulses. No edema  PULM/CHEST: Clear to auscultation bilaterally. No wheezes  ABD: Soft, non-tender, non-distended.   RECTAL: deferred  Extremities: No obvious deformity  NEURO: Awake, alert, following commands, moving  extremities, answering questions.   SKIN: Warm and dry. No rash or lesions.  PSYCH: Normal judgment. Normal affect.        ED Course     Labs Reviewed   CBC WITH DIFFERENTIAL WITH PLATELET - Abnormal; Notable for the following components:       Result Value    WBC 2.2 (*)     Neutrophil Absolute Prelim 0.09 (*)     Neutrophil Absolute 0.09 (*)     All other components within normal limits   COMP METABOLIC PANEL (14) - Abnormal; Notable for the following components:    Glucose 155 (*)     BUN 25 (*)     BUN/CREA Ratio 23.6 (*)     Calculated Osmolality 298 (*)     All other components within normal limits   PTT, ACTIVATED - Abnormal; Notable for the following components:    PTT 36.2 (*)     All other components within normal limits   PROCALCITONIN - Abnormal; Notable for the following components:    Procalcitonin 0.07 (*)     All other components within normal limits   TROPONIN I HIGH SENSITIVITY - Normal   LACTIC ACID, PLASMA - Normal   PROTHROMBIN TIME (PT) - Normal   SCAN SLIDE   URINALYSIS WITH CULTURE REFLEX   RAINBOW DRAW LAVENDER   RAINBOW DRAW LIGHT GREEN   RAINBOW DRAW BLUE   RAINBOW DRAW GOLD   BLOOD CULTURE   BLOOD CULTURE     EKG    Rate, intervals and axes as noted on EKG Report.  Rate: 79  Rhythm: Sinus Rhythm  Reading: No ectopy      MDM      Assessment:Patient is a 59 year old male presenting to the ED due to fever, abnormal labs.    Comorbidities/chronic illnesses impacting care: HTN, CAD    History obtained from: patient    External records and previous hospitalization records reviewed and documented below    Consideration of Social Determinants of Health and Impact on Medical Decision Making:  Housing/Transportation/Financial Strain/Access to healthcare/Food insecurity/family or Community support/Language and Literacy/Substance abuse/Mental health concerns/Disabilities     -none    Radiography/Imaging:  XR CHEST AP PORTABLE  (CPT=71045)   Final Result   PROCEDURE: XR CHEST AP PORTABLE  (CPT=71045)    TIME: 1534 hours       COMPARISON: [facility], XR CHEST PA + LAT CHEST    (CPT=71046), 3/27/2022, 7:45 AM.       INDICATIONS: Abnormal Labs, fever       TECHNIQUE:   Single view.         FINDINGS:    CARDIAC/VASC: Post coronary artery bypass. Heart size and pulmonary    vascularity normal.    MEDIAST/AIYANA:   No visible mass or adenopathy.   LUNGS/PLEURA: No consolidation or pleural effusion.   BONES: Post sternotomy.   OTHER: Negative.                     =====   CONCLUSION:    1. No acute cardiopulmonary finding.               Dictated by (CST): Marty Gross MD on 4/13/2025 at 3:47 PM        Finalized by (CST): Marty Gross MD on 4/13/2025 at 3:47 PM                       ED course  Patient arrives to ER hypertensive.  Has had viral URI type symptoms for the last 10 days, though with night sweats.  No recent travel.  Reviewed his labs done earlier today, shows neutropenia.  His repeat absolute neutrophil count is just about 200 here.  Will discuss with hematology, give empiric antibiotics, cultures, possibly plan for admission    Laboratory results above were independently viewed and interpreted as: Neutropenia, normal renal function  Radiology: ordered and independently interpreted as: Chest x-ray negative for infiltrate    In discussion with hematology, likely viral induced neutropenia, but will cover with antibiotics, plan for admission and their consult    Time spent providing Critical Care Services to the patient (excluding time spent performing separately billable procedures): 35 minutes.  Most of the time was spent at the bedside of the patient, reevaluating the patient and vital signs, explaining conditions and results to the patient and/or family, as well as speaking to the PMD and/or Consultant(s).    Medications   ceFEPIme (Maxipime) 2 g in sodium chloride 0.9% 100mL IVPB-ANGEL (2 g Intravenous New Bag 4/13/25 1543)     Admission disposition: 4/13/2025  3:35 PM    Disposition and Plan      Clinical Impression:  1. Neutropenic fever         Disposition:  Admit  4/13/2025  3:35 pm     Hospital Problems       Present on Admission  Date Reviewed: 3/8/2025          ICD-10-CM Noted POA    Neutropenic fever D70.9, R50.81 4/13/2025 Unknown           Signed by Doyle Jones MD on 4/13/2025  4:15 PM             History and Physical    H&P signed by Yaniv Crump MD at 4/13/2025  5:53 PM    [facility]  part of [facility]     History & Physical       Date:  4/13/2025  Date of Admission:  4/13/2025     History provided by:patient  Chief Complaint:          Chief Complaint   Patient presents with    Abnormal Result         HPI:   Wendi Mart is a 59 year old with a history of CAD, HTN and HLD, who presents with cough, fever, and abnormal labs. Patient has been in a good health until about 10 days ago, when he developed dry cough, subjective fevers, chills, body aches and malaise. There was no chest pain, abdominal pain, emesis, diarrhea, bruising, rash or joint swelling. He went to doctor's office earlier today. Blood work showed low WBC. He was then sent to hospital. In ED, patient has stable vitals. Blood work showed ANC 90, procal 0.07. Blood culture obtained. CXR showed no acute cardiopulmonary finding.Cefepime given. Hematology consulted.     ssessment/Plan:   Wendi Mart is a 59 year old with a history of CAD, HTN and HLD, who presents with cough, fever, and abnormal labs.      # Neutropenia  - ANC 90 on arrival, normal plt and hgb, likely due to an unspecified viral infection.  - Blood culture obtained  - prophylactic cefepime  - Hematology consulted     # CAD  # HTN  # HLD  - stable  - continue home meds     Diet: regular  PT/OT: deferred  DVT ppx: Lovenox  Line: none  Code status: Full  Dispo: expect greater than 2 midnights     MDM: high Yaniv Crump MD, PhD              4/14          Date of Service: 4/14/2025  8:22 AM     Signed         [facility]  [facility] of [facility]     Progress Note   Chief complaint neutropenic fever      Subjective:   Wendi Mart is a(n) 59 year old male Pt doing ok. Feels weak. Reports gas build up at home for last 2 mo's. Reports wt gain. Recent change in diet . Cut out fruit / veggies because of gas      ROS:   No cp, sob   No c/d   No n/v      Objective:   Blood pressure 124/81, pulse 82, temperature 98.2 °F (36.8 °C), temperature source Oral, resp. rate 20, height 5' 10\" (1.778 m), weight 154 lb (69.9 kg), SpO2 96%.        Intake/Output Summary (Last 24 hours) at 4/14/2025 0822  Last data filed at 4/13/2025 1800      Gross per 24 hour   Intake 450 ml   Output --   Net 450 ml         Patient Weight(s) for the past 336 hrs:    Weight   04/13/25 1705 154 lb (69.9 kg)   04/13/25 1702 154 lb 12.8 oz (70.2 kg)   04/13/25 1357 150 lb (68 kg)               General appearance: alert, appears stated age and cooperative  Pulmonary:  clear to auscultation bilaterally  Cardiovascular: S1, S2 normal, no murmur, click, rub or gallop, regular rate and rhythm  Abdominal: soft, non-tender; bowel sounds normal; no masses,  no organomegaly  Extremities: extremities normal, atraumatic, no cyanosis or edema           Medicines:   [Current Hospital Medications]    [Current Hospital Medications]         Current Facility-Administered Medications   Medication Dose Route Frequency    ceFEPIme (Maxipime) 1 g in sodium chloride 0.9% 100mL IVPB-ANGEL  1 g Intravenous Q8H    enoxaparin (Lovenox) 40 MG/0.4ML SUBQ injection 40 mg  40 mg Subcutaneous Nightly    acetaminophen (Tylenol Extra Strength) tab 500 mg  500 mg Oral Q4H PRN    melatonin tab 3 mg  3 mg Oral Nightly PRN    polyethylene glycol (PEG 3350) (Miralax) 17 g oral packet 17 g  17 g Oral Daily PRN    sennosides (Senokot) tab 17.2 mg  17.2 mg Oral Nightly PRN    bisacodyl (Dulcolax) 10 MG rectal suppository 10 mg  10 mg Rectal Daily PRN    fleet enema (Fleet) rectal enema 133 mL  1  enema Rectal Once PRN    ondansetron (Zofran) 4 MG/2ML injection 4 mg  4 mg Intravenous Q6H PRN    prochlorperazine (Compazine) 10 MG/2ML injection 5 mg  5 mg Intravenous Q8H PRN    guaiFENesin (Robitussin) 100 MG/5 ML oral liquid 200 mg  200 mg Oral Q4H PRN    aspirin DR tab 81 mg  81 mg Oral Daily    carvedilol (Coreg) tab 6.25 mg  6.25 mg Oral BID with meals    montelukast (Singulair) tab 10 mg  10 mg Oral Daily    pantoprazole (Protonix) DR tab 40 mg  40 mg Oral Before breakfast    rosuvastatin (Crestor) tab 40 mg  40 mg Oral Nightly         Blood Pressure and Cardiac Medications               carvedilol 6.25 MG Oral Tab                [Medication Infusions]    [Medication Infusions]                 Lab Results   Component Value Date     WBC 1.8 (L) 04/14/2025     HGB 13.5 04/14/2025     HCT 41.7 04/14/2025     .0 (L) 04/14/2025     CREATSERUM 0.95 04/14/2025     BUN 22 04/14/2025      04/14/2025     K 4.0 04/14/2025      04/14/2025     CO2 26.0 04/14/2025      (H) 04/14/2025     CA 8.3 (L) 04/14/2025     ALB 4.3 04/13/2025     ALKPHO 71 04/13/2025     BILT 0.7 04/13/2025     TP 6.7 04/13/2025     AST 24 04/13/2025     ALT 25 04/13/2025     PTT 36.2 (H) 04/13/2025     INR 1.07 04/13/2025     TSH 0.955 04/13/2025      05/16/2019     PSA 0.6 12/13/2017     DDIMER 0.65 (H) 03/24/2022     ESRML 86 (H) 03/28/2022     MG 2.0 03/28/2022     TROP <0.045 05/16/2019     B12 1,010 (H) 02/11/2024         XR CHEST AP PORTABLE  (CPT=71045)  Result Date: 4/13/2025  CONCLUSION:  1. No acute cardiopulmonary finding.    Dictated by (CST): Marty Gross MD on 4/13/2025 at 3:47 PM     Finalized by (CST): Marty Gross MD on 4/13/2025 at 3:47 PM           EKG  Result Date: 4/14/2025  Normal sinus rhythm Nonspecific T wave abnormality Abnormal ECG No previous ECGs found in [facility] Confirmed by DO Hall Asif ([id number]) on 4/14/2025 6:17:36 AM        Results:      CBC:          Lab Results   Component  Value Date     WBC 1.8 (L) 04/14/2025     WBC 2.2 (L) 04/13/2025     WBC 2.4 (L) 04/13/2025            Lab Results   Component Value Date     HGB 13.5 04/14/2025     HGB 14.9 04/13/2025     HGB 14.6 04/13/2025            Lab Results   Component Value Date     .0 (L) 04/14/2025     .0 04/13/2025     .0 (L) 04/13/2025               Recent Labs   Lab 04/13/25  0836 04/13/25  1413 04/14/25  0632   * 155* 136*   BUN 20 25* 22   CREATSERUM 0.96 1.06 0.95   CA 8.7 9.0 8.3*    140 140   K 4.0 4.4 4.0    109 108   CO2 25.0 25.0 26.0               Assessment and Plan:       Wendi Mart is a 59 year old with a history of CAD, HTN and HLD, who presents with cough, fever, and abnormal labs.      # Neutropenia  - ANC 90 on arrival, normal plt and hgb, likely due to an unspecified viral infection. Viral panel neg   - Blood culture obtained - await input   - cont prophylactic cefepime  - Hematology consulted - await input      # CAD  # HTN  # HLD  - stable  - continue home meds     Diet: regular  PT/OT: deferred  DVT ppx: Lovenox  Line: none  Code status: Full  Dispo: expect greater than 2 midnights     MDM: high Stella Mcleod DO           Chart reviewed, including current vitals, notes, labs and imaging  Labs ordered and medications adjusted as outlined above  Coordinate care with care team/consultants  Discussed with patient results of tests, management plan as outlined above, and the need for ongoing hospitalization  D/w RN     MDM high           4/14/2025                        MEDICATIONS ADMINISTERED IN LAST 1 DAY:  aspirin DR tab 81 mg       Date Action Dose Route User    4/14/2025 0906 Given 81 mg Oral Dario Greenberg RN          carvedilol (Coreg) tab 6.25 mg       Date Action Dose Route User    4/14/2025 0906 Given 6.25 mg Oral Dario Greenberg RN    4/13/2025 1757 Given 6.25 mg Oral Markus, Daphnie, RN          ceFEPIme (Maxipime) 2 g in sodium chloride 0.9% 100mL IVPB-ANGEL        Date Action Dose Route User    4/13/2025 1543 New Bag 2 g Intravenous Maroi Hoover RN          enoxaparin (Lovenox) 40 MG/0.4ML SUBQ injection 40 mg       Date Action Dose Route User    4/13/2025 2152 Given 40 mg Subcutaneous (Right Lower Abdomen) Elle Ross RN          pantoprazole (Protonix) DR tab 40 mg       Date Action Dose Route User    4/14/2025 0613 Given 40 mg Oral Elle Ross RN          rosuvastatin (Crestor) tab 40 mg       Date Action Dose Route User    4/13/2025 2151 Given 40 mg Oral Elle Ross RN            Vitals (last day)       Date/Time Temp Pulse Resp BP SpO2 Weight O2 Device O2 Flow Rate (L/min) [facility]    04/14/25 0615 98.2 °F (36.8 °C) 82 20 124/81 96 % -- None (Room air) -- SG    04/13/25 1958 98.7 °F (37.1 °C) 73 17 127/81 95 % -- None (Room air) -- KT    04/13/25 1705 -- -- -- -- -- 154 lb (69.9 kg) -- -- MK    04/13/25 1702 97.6 °F (36.4 °C) 74 20 148/88 97 % 154 lb 12.8 oz (70.2 kg) None (Room air) -- BS    04/13/25 1645 -- 80 22 153/89 94 % -- -- -- DE    04/13/25 1630 -- 77 17 132/87 94 % -- -- -- RW    04/13/25 1615 -- 77 18 123/78 94 % -- None (Room air) -- DE    04/13/25 1602 -- 74 18 128/84 96 % -- None (Room air) -- RW    04/13/25 1530 -- 76 20 133/86 95 % -- -- -- RW    04/13/25 1357 97.6 °F (36.4 °C) 79 18 164/107 97 % 150 lb (68 kg) None (Room air) -- MS

## 2025-04-14 NOTE — CONSULTS
[facility] Hematology Oncology  Initial Inpatient Consult Note    Wendi Mart Patient Status:  Inpatient    1965 MRN [id number]   Location [facility] 4W/SW/SE Attending Stella Mcleod, DO   Hosp Day # 1 PCP Jose Antonio Purcell MD     Subjective:   Wendi Mart is a 59 year old male with a history of CAD hypertension hyperlipidemia who was admitted with severe neutropenia from his PCPs office.  He admits to general malaise, URI symptoms for the last week or 2 intermittent low-grade fevers.  He has also had intermittent abdominal pain, denies significant fatigue low appetite unintentional weight loss night sweats lymphadenopathy nausea vomiting diarrhea, recent travel.  He has been sick recently and so has his family.  Denies significant alcohol intake tobacco use.    Review of Systems:  Hematology/Oncology ROS performed and negative except as above in HPI    History/Other:   Past Medical History:  Past Medical History[1]    Past Surgical History:  Past Surgical History[2]    Current Medications:  Current Medications[3]    Allergies:   Allergies[4]    Family Medical History:  Family History[5]    Social History:  Social History     Socioeconomic History    Marital status:      Spouse name: Not on file    Number of children: 2    Years of education: Not on file    Highest education level: Not on file   Occupational History    Occupation: DATA  ANALYST     Employer: [employer]   Tobacco Use    Smoking status: Never    Smokeless tobacco: Never   Vaping Use    Vaping status: Never Used   Substance and Sexual Activity    Alcohol use: No    Drug use: No    Sexual activity: Yes     Partners: Female   Other Topics Concern     Service Not Asked    Blood Transfusions Not Asked    Caffeine Concern Yes     Comment: coffee,1 cup    Occupational Exposure Not Asked    Hobby Hazards Not Asked    Sleep Concern Not Asked    Stress Concern Not Asked    Weight Concern Not Asked    Special Diet  Not Asked    Back Care Not Asked    Exercise Not Asked    Bike Helmet Not Asked    Seat Belt Not Asked    Self-Exams Not Asked    Grew up on a farm Not Asked    History of tanning Not Asked    Outdoor occupation Not Asked    Reaction to local anesthetic No    Pt has a pacemaker No    Pt has a defibrillator No   Social History Narrative    Not on file     Social Drivers of Health     Food Insecurity: No Food Insecurity (4/13/2025)    NCSS - Food Insecurity     Worried About Running Out of Food in the Last Year: No     Ran Out of Food in the Last Year: No   Transportation Needs: No Transportation Needs (4/13/2025)    NCSS - Transportation     Lack of Transportation: No   Housing Stability: Not At Risk (4/13/2025)    NCSS - Housing/Utilities     Has Housing: Yes     Worried About Losing Housing: No     Unable to Get Utilities: No       Gyn History:      Objective:    /82 (BP Location: Right arm)   Pulse 68   Temp 97.9 °F (36.6 °C) (Oral)   Resp 18   Ht 1.778 m (5' 10\")   Wt 69.9 kg (154 lb)   SpO2 94%   BMI 22.10 kg/m²   Physical Exam:  General: A&Ox3, NAD  HEENT: PERRL, OP clear  Neck: supple, no LAD or JVD  CV: RRR, no murmurs, + pulses  Pulm: CTA b/l, no w/r/r, normal effort  Abd: soft, ntnd, normal bowel sounds, no HSM or masses  Lymph: no palpable lymphadenopathy throughout the cervical, supraclavicular, axillary regions  Extremities: no edema or calf tenderness  Neurological: Grossly intact    Labs:  Lab Results   Component Value Date/Time    WBC 1.8 (L) 04/14/2025 06:32 AM    RBC 4.59 04/14/2025 06:32 AM    HGB 13.5 04/14/2025 06:32 AM    HCT 41.7 04/14/2025 06:32 AM    MCV 90.8 04/14/2025 06:32 AM    MCH 29.4 04/14/2025 06:32 AM    MCHC 32.4 04/14/2025 06:32 AM    RDW 13.9 04/14/2025 06:32 AM    NEPRELIM 0.26 (LL) 04/14/2025 06:32 AM    .0 (L) 04/14/2025 06:32 AM       Lab Results   Component Value Date/Time     (H) 04/14/2025 06:32 AM    BUN 22 04/14/2025 06:32 AM    CREATSERUM 0.95  04/14/2025 06:32 AM    GFRNAA 107 03/28/2022 03:28 AM    CA 8.3 (L) 04/14/2025 06:32 AM    ALB 4.3 04/13/2025 02:13 PM     04/14/2025 06:32 AM    K 4.0 04/14/2025 06:32 AM     04/14/2025 06:32 AM    CO2 26.0 04/14/2025 06:32 AM    ALKPHO 71 04/13/2025 02:13 PM    AST 24 04/13/2025 02:13 PM    ALT 25 04/13/2025 02:13 PM       Imaging:  XR CHEST AP PORTABLE  (CPT=71045)  Result Date: 4/13/2025  CONCLUSION:  1. No acute cardiopulmonary finding.    Dictated by (CST): Marty Gross MD on 4/13/2025 at 3:47 PM     Finalized by (CST): Marty Gross MD on 4/13/2025 at 3:47 PM           Assessment & Plan:    Wendi Mart is a 59 year old male with a history of CAD hypertension hyperlipidemia who was admitted with severe neutropenia from his PCPs office.     Admitted for observation and antibiotics.  Unlikely to be bacterial infection though could be a viral syndrome.  RVP was negative, ordered EBV and CMV, extensive laboratory workup ordered for the morning.  If he remains afebrile and rather asymptomatic he can be discharged tomorrow morning after blood draw and I will see him back in clinic early next week for continued observation and workup.    Karthik Keating,     [facility] Hematology/Oncology  [facility]     This note was created using a voice-recognition transcribing system. Incorrect words or phrases may have been missed during proofreading. Please interpret accordingly.       [1]   Past Medical History:   Atherosclerosis of coronary artery    Stenting of Left Circumflex and Ramus    Coronary atherosclerosis    Essential hypertension    Heart disease    High blood pressure    High cholesterol    Hyperlipidemia   [2]   Past Surgical History:  Procedure Laterality Date    Angioplasty (coronary)  8/2015    Cabg      x 4    Cath drug eluting stent  06/02/2015    Colonoscopy N/A 07/03/2018    Procedure: COLONOSCOPY;  Surgeon: Skinny Shi MD;  Location: [facility] ENDOSCOPY     Incision of pyloric muscle  1965    Skin surgery Left 12/10/2020    epidermal inclusion cyst, lower back   [3]    [COMPLETED] ceFEPIme (Maxipime) 2 g in sodium chloride 0.9% 100mL IVPB-ANGEL  2 g Intravenous Once    ceFEPIme (Maxipime) 1 g in sodium chloride 0.9% 100mL IVPB-ANGEL  1 g Intravenous Q8H    enoxaparin (Lovenox) 40 MG/0.4ML SUBQ injection 40 mg  40 mg Subcutaneous Nightly    acetaminophen (Tylenol Extra Strength) tab 500 mg  500 mg Oral Q4H PRN    melatonin tab 3 mg  3 mg Oral Nightly PRN    polyethylene glycol (PEG 3350) (Miralax) 17 g oral packet 17 g  17 g Oral Daily PRN    sennosides (Senokot) tab 17.2 mg  17.2 mg Oral Nightly PRN    bisacodyl (Dulcolax) 10 MG rectal suppository 10 mg  10 mg Rectal Daily PRN    fleet enema (Fleet) rectal enema 133 mL  1 enema Rectal Once PRN    ondansetron (Zofran) 4 MG/2ML injection 4 mg  4 mg Intravenous Q6H PRN    prochlorperazine (Compazine) 10 MG/2ML injection 5 mg  5 mg Intravenous Q8H PRN    guaiFENesin (Robitussin) 100 MG/5 ML oral liquid 200 mg  200 mg Oral Q4H PRN    aspirin DR tab 81 mg  81 mg Oral Daily    carvedilol (Coreg) tab 6.25 mg  6.25 mg Oral BID with meals    montelukast (Singulair) tab 10 mg  10 mg Oral Daily    pantoprazole (Protonix) DR tab 40 mg  40 mg Oral Before breakfast    rosuvastatin (Crestor) tab 40 mg  40 mg Oral Nightly   [4]   Allergies  Allergen Reactions    Seasonal OTHER (SEE COMMENTS)     Seasonal spring   [5]   Family History  Problem Relation Age of Onset    Heart Disease Father         premature CAD (cause of death)    Lipids Mother         hyperlipidemia    Stroke Paternal Grandmother         CVA    Diabetes Other         family h/o    Stroke Other         family h/o CVA    Stroke Cousin         CVA    Glaucoma Neg     Macular degeneration Neg

## 2025-04-14 NOTE — PROGRESS NOTES
[facility]  part of [facility]    Progress Note    Wendi Mart Patient Status:  Inpatient    1965 MRN [id number]   Location [facility] 4W/SW/SE Attending Stella Mcleod, DO   Hosp Day # 1 PCP Jose Antonio Purcell MD     Chief complaint neutropenic fever     Subjective:   Wendi Mart is a(n) 59 year old male Pt doing ok. Feels weak. Reports gas build up at home for last 2 mo's. Reports wt gain. Recent change in diet . Cut out fruit / veggies because of gas     ROS:   No cp, sob   No c/d   No n/v     Objective:   Blood pressure 124/81, pulse 82, temperature 98.2 °F (36.8 °C), temperature source Oral, resp. rate 20, height 5' 10\" (1.778 m), weight 154 lb (69.9 kg), SpO2 96%.      Intake/Output Summary (Last 24 hours) at 2025 0822  Last data filed at 2025 1800  Gross per 24 hour   Intake 450 ml   Output --   Net 450 ml       Patient Weight(s) for the past 336 hrs:   Weight   25 1705 154 lb (69.9 kg)   25 1702 154 lb 12.8 oz (70.2 kg)   25 1357 150 lb (68 kg)           General appearance: alert, appears stated age and cooperative  Pulmonary:  clear to auscultation bilaterally  Cardiovascular: S1, S2 normal, no murmur, click, rub or gallop, regular rate and rhythm  Abdominal: soft, non-tender; bowel sounds normal; no masses,  no organomegaly  Extremities: extremities normal, atraumatic, no cyanosis or edema        Medicines:   Current Hospital Medications[1]                Blood Pressure and Cardiac Medications            carvedilol 6.25 MG Oral Tab            Medication Infusions[2]        Lab Results   Component Value Date    WBC 1.8 (L) 2025    HGB 13.5 2025    HCT 41.7 2025    .0 (L) 2025    CREATSERUM 0.95 2025    BUN 22 2025     2025    K 4.0 2025     2025    CO2 26.0 2025     (H) 2025    CA 8.3 (L) 2025    ALB 4.3 2025    ALKPHO 71  04/13/2025    BILT 0.7 04/13/2025    TP 6.7 04/13/2025    AST 24 04/13/2025    ALT 25 04/13/2025    PTT 36.2 (H) 04/13/2025    INR 1.07 04/13/2025    TSH 0.955 04/13/2025     05/16/2019    PSA 0.6 12/13/2017    DDIMER 0.65 (H) 03/24/2022    ESRML 86 (H) 03/28/2022    MG 2.0 03/28/2022    TROP <0.045 05/16/2019    B12 1,010 (H) 02/11/2024       XR CHEST AP PORTABLE  (CPT=71045)  Result Date: 4/13/2025  CONCLUSION:  1. No acute cardiopulmonary finding.    Dictated by (CST): Marty Gross MD on 4/13/2025 at 3:47 PM     Finalized by (CST): Marty Gross MD on 4/13/2025 at 3:47 PM          EKG  Result Date: 4/14/2025  Normal sinus rhythm Nonspecific T wave abnormality Abnormal ECG No previous ECGs found in Muse Confirmed by DO Hall Asif ([id number]) on 4/14/2025 6:17:36 AM      Results:     CBC:    Lab Results   Component Value Date    WBC 1.8 (L) 04/14/2025    WBC 2.2 (L) 04/13/2025    WBC 2.4 (L) 04/13/2025     Lab Results   Component Value Date    HGB 13.5 04/14/2025    HGB 14.9 04/13/2025    HGB 14.6 04/13/2025      Lab Results   Component Value Date    .0 (L) 04/14/2025    .0 04/13/2025    .0 (L) 04/13/2025       Recent Labs   Lab 04/13/25  0836 04/13/25  1413 04/14/25  0632   * 155* 136*   BUN 20 25* 22   CREATSERUM 0.96 1.06 0.95   CA 8.7 9.0 8.3*    140 140   K 4.0 4.4 4.0    109 108   CO2 25.0 25.0 26.0           Assessment and Plan:       Wendi Mart is a 59 year old with a history of CAD, HTN and HLD, who presents with cough, fever, and abnormal labs.      # Neutropenia  - ANC 90 on arrival, normal plt and hgb, likely due to an unspecified viral infection. Viral panel neg   - Blood culture obtained - await input   - cont prophylactic cefepime  - Hematology consulted - await input      # CAD  # HTN  # HLD  - stable  - continue home meds     Diet: regular  PT/OT: deferred  DVT ppx: Lovenox  Line: none  Code status: Full  Dispo: expect greater than 2  midnights     MDM: [facility]      Stella Mcleod DO         Chart reviewed, including current vitals, notes, labs and imaging  Labs ordered and medications adjusted as outlined above  Coordinate care with care team/consultants  Discussed with patient results of tests, management plan as outlined above, and the need for ongoing hospitalization  D/w RN     [facility]        4/14/2025             Supplementary Documentation:   DVT Mechanical Prophylaxis:     Early ambuation  DVT Pharmacologic Prophylaxis   Medication    enoxaparin (Lovenox) 40 MG/0.4ML SUBQ injection 40 mg         DVT Pharmacologic prophylaxis: Aspirin 81 mg      Code Status: Full Code  Self: No urinary catheter in place  Self Duration (in days):   Central line:    CONCEPCION: 4/15/2025                          [1]   Current Facility-Administered Medications   Medication Dose Route Frequency    ceFEPIme (Maxipime) 1 g in sodium chloride 0.9% 100mL IVPB-ANGEL  1 g Intravenous Q8H    enoxaparin (Lovenox) 40 MG/0.4ML SUBQ injection 40 mg  40 mg Subcutaneous Nightly    acetaminophen (Tylenol Extra Strength) tab 500 mg  500 mg Oral Q4H PRN    melatonin tab 3 mg  3 mg Oral Nightly PRN    polyethylene glycol (PEG 3350) (Miralax) 17 g oral packet 17 g  17 g Oral Daily PRN    sennosides (Senokot) tab 17.2 mg  17.2 mg Oral Nightly PRN    bisacodyl (Dulcolax) 10 MG rectal suppository 10 mg  10 mg Rectal Daily PRN    fleet enema (Fleet) rectal enema 133 mL  1 enema Rectal Once PRN    ondansetron (Zofran) 4 MG/2ML injection 4 mg  4 mg Intravenous Q6H PRN    prochlorperazine (Compazine) 10 MG/2ML injection 5 mg  5 mg Intravenous Q8H PRN    guaiFENesin (Robitussin) 100 MG/5 ML oral liquid 200 mg  200 mg Oral Q4H PRN    aspirin DR tab 81 mg  81 mg Oral Daily    carvedilol (Coreg) tab 6.25 mg  6.25 mg Oral BID with meals    montelukast (Singulair) tab 10 mg  10 mg Oral Daily    pantoprazole (Protonix) DR tab 40 mg  40 mg Oral Before breakfast    rosuvastatin (Crestor) tab 40 mg   40 mg Oral Nightly   [2]

## 2025-04-15 VITALS
HEART RATE: 75 BPM | TEMPERATURE: 99 F | SYSTOLIC BLOOD PRESSURE: 125 MMHG | BODY MASS INDEX: 22.05 KG/M2 | RESPIRATION RATE: 16 BRPM | DIASTOLIC BLOOD PRESSURE: 86 MMHG | OXYGEN SATURATION: 94 % | WEIGHT: 154 LBS | HEIGHT: 70 IN

## 2025-04-15 LAB
ANION GAP SERPL CALC-SCNC: 7 MMOL/L (ref 0–18)
BASOPHILS # BLD AUTO: 0.02 X10(3) UL (ref 0–0.2)
BASOPHILS NFR BLD AUTO: 1 %
BUN BLD-MCNC: 20 MG/DL (ref 9–23)
BUN/CREAT SERPL: 22.2 (ref 10–20)
CALCIUM BLD-MCNC: 8.7 MG/DL (ref 8.7–10.4)
CD10 CELLS NFR SPEC: <1 %
CD10/CD19: <1 %
CD19 CELLS NFR SPEC: 12 %
CD19+/CD200+: 9 %
CD2 CELLS NFR SPEC: 81 %
CD20 CELLS NFR SPEC: 13 %
CD200 CELLS: 14 %
CD3 CELLS NFR SPEC: 65 %
CD3+/TCRGD+: <1 %
CD3+CD4+ CELLS NFR SPEC: 48 %
CD3+CD4+ CELLS/CD3+CD8+ CLL SPEC: 3
CD3+CD8+ CELLS NFR SPEC: 16 %
CD3-/CD56+: 19 %
CD34 CELLS NFR SPEC: <1 %
CD38 CELLS NFR SPEC: 15 %
CD38+/CD19+: <1 %
CD45 CELLS NFR SPEC: 100 %
CD5 CELLS NFR SPEC: 68 %
CD5/CD19 CELLS: 1 %
CD7 CELLS NFR SPEC: 81 %
CELL SURF KAPPA/LAMBDA RATIO: 1.4
CELL SURF LAMBDA LIGHT CHAIN: 5 %
CELL SURFACE KAPPA LIGHT CHAIN: 7 %
CHLORIDE SERPL-SCNC: 107 MMOL/L (ref 98–112)
CO2 SERPL-SCNC: 26 MMOL/L (ref 21–32)
CREAT BLD-MCNC: 0.9 MG/DL (ref 0.7–1.3)
CRP SERPL-MCNC: 1.3 MG/DL (ref ?–1)
DEPRECATED RDW RBC AUTO: 45.1 FL (ref 35.1–46.3)
EGFRCR SERPLBLD CKD-EPI 2021: 100 ML/MIN/1.73M2 (ref 60–?)
EOSINOPHIL # BLD AUTO: 0.06 X10(3) UL (ref 0–0.7)
EOSINOPHIL NFR BLD AUTO: 3 %
ERYTHROCYTE [DISTWIDTH] IN BLOOD BY AUTOMATED COUNT: 13.4 % (ref 11–15)
ERYTHROCYTE [SEDIMENTATION RATE] IN BLOOD: 30 MM/HR (ref 0–20)
FOLATE SERPL-MCNC: 21.3 NG/ML (ref 5.4–?)
GLUCOSE BLD-MCNC: 129 MG/DL (ref 70–99)
HAV AB SER QL IA: REACTIVE
HAV IGM SER QL: NONREACTIVE
HBV CORE AB SERPL QL IA: NONREACTIVE
HBV SURFACE AB SER QL: NONREACTIVE
HBV SURFACE AB SERPL IA-ACNC: <3.1 MIU/ML
HBV SURFACE AG SERPL QL IA: NONREACTIVE
HCT VFR BLD AUTO: 43 % (ref 39–53)
HCV AB SERPL QL IA: NONREACTIVE
HGB BLD-MCNC: 14.3 G/DL (ref 13–17.5)
IMM GRANULOCYTES # BLD AUTO: 0.01 X10(3) UL (ref 0–1)
IMM GRANULOCYTES NFR BLD: 0.5 %
LDH SERPL L TO P-CCNC: 143 U/L (ref 120–246)
LYMPHOCYTES # BLD AUTO: 0.88 X10(3) UL (ref 1–4)
LYMPHOCYTES NFR BLD AUTO: 43.8 %
MCH RBC QN AUTO: 30.1 PG (ref 26–34)
MCHC RBC AUTO-ENTMCNC: 33.3 G/DL (ref 31–37)
MCV RBC AUTO: 90.5 FL (ref 80–100)
MONOCYTES # BLD AUTO: 0.66 X10(3) UL (ref 0.1–1)
MONOCYTES NFR BLD AUTO: 32.8 %
NEUTROPHILS # BLD AUTO: 0.38 X10 (3) UL (ref 1.5–7.7)
NEUTROPHILS # BLD AUTO: 0.38 X10(3) UL (ref 1.5–7.7)
NEUTROPHILS NFR BLD AUTO: 18.9 %
OSMOLALITY SERPL CALC.SUM OF ELEC: 294 MOSM/KG (ref 275–295)
PLATELET # BLD AUTO: 137 10(3)UL (ref 150–450)
PLATELETS.RETICULATED NFR BLD AUTO: 3.5 % (ref 0–7)
POTASSIUM SERPL-SCNC: 4.1 MMOL/L (ref 3.5–5.1)
RBC # BLD AUTO: 4.75 X10(6)UL (ref 4.3–5.7)
SODIUM SERPL-SCNC: 140 MMOL/L (ref 136–145)
TCR G-D CELLS NFR SPEC: 1 %
VIT B12 SERPL-MCNC: 919 PG/ML (ref 211–911)
WBC # BLD AUTO: 2 X10(3) UL (ref 4–11)

## 2025-04-15 PROCEDURE — 99239 HOSP IP/OBS DSCHRG MGMT >30: CPT | Performed by: HOSPITALIST

## 2025-04-15 NOTE — DISCHARGE SUMMARY
[facility]  part of [facility]    Discharge Summary    Wendi Mart Patient Status:  Inpatient    1965 MRN [id number]   Location [facility] 4W/SW/SE Attending Lin Mota MD   Hosp Day # 2 PCP Jose Antonio Purcell MD     Date of Admission: 2025   Date of Discharge: 4/15/2025    Hospital Discharge Diagnoses: Acute Neutropenic Fever     Lace+ Score: 28  59-90 High Risk  29-58 Medium Risk  0-28   Low Risk.    TCM Follow-Up Recommendation:  LACE 29-58: Moderate Risk of readmission after discharge from the hospital.        Admitting Diagnosis: Neutropenic fever [D70.9, R50.81]    Disposition: Home    Discharge Diagnosis: .Principal Problem:    Neutropenic fever      Hospital Course:   Reason for Admission:   Per Dr. Crump  Wendi Mart is a 59 year old with a history of CAD, HTN and HLD, who presents with cough, fever, and abnormal labs. Patient has been in a good health until about 10 days ago, when he developed dry cough, subjective fevers, chills, body aches and malaise. There was no chest pain, abdominal pain, emesis, diarrhea, bruising, rash or joint swelling. He went to doctor's office earlier today. Blood work showed low WBC. He was then sent to hospital. In ED, patient has stable vitals. Blood work showed ANC 90, procal 0.07. Blood culture obtained. CXR showed no acute cardiopulmonary finding.Cefepime given. Hematology consulted.     Discharge Physical Exam:   Physical Exam:    General: No acute distress.   Respiratory: Clear to auscultation bilaterally. No wheezes. No rhonchi.  Cardiovascular: S1, S2. Regular rate and rhythm. No murmurs, rubs or gallops.   Abdomen: Soft, nontender, nondistended.  Positive bowel sounds. No rebound or guarding.  Neurologic: No focal neurological deficits.   Musculoskeletal: Moves all extremities.    Hospital Course:     # Neutropenia  - ANC 90 on arrival, normal plt and hgb, likely due to an unspecified viral infection. Viral  panel neg   - Blood culture obtained - await input   - discontinue antibiotics   - Hematology consulted - stable for discharge today      # CAD  # HTN  # HLD  - stable  - continue home meds     Diet: regular  PT/OT: deferred  DVT ppx: Lovenox  Line: none  Code status: Full  Dispo: expect greater than 2 midnights     MDM: high        Lin Mota MD            Chart reviewed, including current vitals, notes, labs and imaging  Labs ordered and medications adjusted as outlined above  Coordinate care with care team/consultants  Discussed with patient results of tests, management plan as outlined above, and the need for ongoing hospitalization  D/w RN     MDM high             Complications: none    Consultants         Provider   Role Specialty     Karthik Keating,       Consulting Physician Hematology and Oncology            Pending Labs       Order Current Status    Cytomegalovirus (CMV) Antibody, IgM/IgG In process    EBV, Chronic/Active Infection,IgG/IgM In process    LEUKEMIA/LYMPHOMA FLOW (P) In process    Leukemia lymphoma flow blood In process    Blood Culture Preliminary result    Blood Culture Preliminary result            Discharge Plan:   Discharge Condition: Stable    Current Discharge Medication List        Home Meds - Unchanged    Details   PANTOPRAZOLE 40 MG Oral Tab EC TAKE 1 TABLET(40 MG) BY MOUTH BEFORE BREAKFAST      carvedilol 6.25 MG Oral Tab Take 1 tablet (6.25 mg total) by mouth 2 (two) times daily with meals.      Omega-3 Fatty Acids (FISH OIL OR) Take by mouth.      saccharomyces boulardii (FLORASTOR) 250 MG Oral Cap Take 1 capsule (250 mg total) by mouth 2 (two) times daily.      rosuvastatin 40 MG Oral Tab Take 1 tablet (40 mg total) by mouth nightly.      Multiple Vitamin Oral Tab Take 1 tablet by mouth.      aspirin 81 MG Oral Tab EC Take 1 tablet (81 mg total) by mouth in the morning.      Ciclopirox 8 % External Solution Apply topically to affected site daily, wash off once per week       montelukast 10 MG Oral Tab Take 1 tablet (10 mg total) by mouth daily.      Tazarotene 0.1 % External Cream USE AT BEDTIME AS DIRECTED      Fluocin-Hydroquinone-Tretinoin (TRI-LAITH) 0.01-4-0.05 % External Cream Use qohs as directed      triamcinolone 0.1 % External Cream Apply 1 Application topically 2 (two) times daily. To areas of itching/ eczema                 Discharge Diet: General     Discharge Activity: As tolerated       Discharge Medications        CONTINUE taking these medications        Instructions Prescription details   aspirin 81 MG Tbec      Take 1 tablet (81 mg total) by mouth in the morning.   Refills: 0     carvedilol 6.25 MG Tabs  Commonly known as: Coreg      Take 1 tablet (6.25 mg total) by mouth 2 (two) times daily with meals.   Quantity: 180 tablet  Refills: 0     Ciclopirox 8 % Soln      Apply topically to affected site daily, wash off once per week   Quantity: 19.8 mL  Refills: 2     FISH OIL OR      Take by mouth.   Refills: 0     montelukast 10 MG Tabs  Commonly known as: Singulair      Take 1 tablet (10 mg total) by mouth daily.   Quantity: 90 tablet  Refills: 3     Multiple Vitamin Tabs      Take 1 tablet by mouth.   Refills: 0     pantoprazole 40 MG Tbec  Commonly known as: Protonix      TAKE 1 TABLET(40 MG) BY MOUTH BEFORE BREAKFAST   Quantity: 90 tablet  Refills: 0     rosuvastatin 40 MG Tabs  Commonly known as: Crestor      Take 1 tablet (40 mg total) by mouth nightly.   Quantity: 30 tablet  Refills: 3     saccharomyces boulardii 250 MG Caps  Commonly known as: Florastor      Take 1 capsule (250 mg total) by mouth 2 (two) times daily.   Quantity: 60 capsule  Refills: 0     Tazarotene 0.1 % Crea      USE AT BEDTIME AS DIRECTED   Quantity: 30 g  Refills: 3     Tri-Laith 0.01-4-0.05 % Crea  Generic drug: Fluocin-Hydroquinone-Tretinoin      Use qohs as directed   Quantity: 30 g  Refills: 5     triamcinolone 0.1 % Crea  Commonly known as: Kenalog      Apply 1 Application topically 2  (two) times daily. To areas of itching/ eczema   Quantity: 80 g  Refills: 3              Follow up:       Follow up Labs and imaging:         Time spent:  > 30 minutes    ERIN PHILLIPS MD  4/15/2025

## 2025-04-15 NOTE — PLAN OF CARE
No acute changes over night. Pt remained afebrile over night. No complaints of pain. IV abx given as ordered.     Problem: RISK FOR INFECTION - ADULT  Goal: Absence of fever/infection during anticipated neutropenic period  Description: INTERVENTIONS- Monitor WBC- Administer growth factors as ordered- Implement neutropenic guidelines  Outcome: Progressing     Problem: SAFETY ADULT - FALL  Goal: Free from fall injury  Description: INTERVENTIONS:- Assess pt frequently for physical needs- Identify cognitive and physical deficits and behaviors that affect risk of falls.- [place] fall precautions as indicated by assessment.- Educate pt/family on patient safety including physical limitations- Instruct pt to call for assistance with activity based on assessment- Modify environment to reduce risk of injury- Provide assistive devices as appropriate- Consider OT/PT consult to assist with strengthening/mobility- Encourage toileting schedule  Outcome: Progressing     Problem: METABOLIC/FLUID AND ELECTROLYTES - ADULT  Goal: Electrolytes maintained within normal limits  Description: INTERVENTIONS:- Monitor labs and rhythm and assess patient for signs and symptoms of electrolyte imbalances- Administer electrolyte replacement as ordered- Monitor response to electrolyte replacements, including rhythm and repeat lab results as appropriate- Fluid restriction as ordered- Instruct patient on fluid and nutrition restrictions as appropriate  Outcome: Progressing  Goal: Hemodynamic stability and optimal renal function maintained  Description: INTERVENTIONS:- Monitor labs and assess for signs and symptoms of volume excess or deficit- Monitor intake, output and patient weight- Monitor urine specific gravity, serum osmolarity and serum sodium as indicated or ordered- Monitor response to interventions for patient's volume status, including labs, urine output, blood pressure (other measures as available)- Encourage oral intake as appropriate-  Instruct patient on fluid and nutrition restrictions as appropriate  Outcome: Progressing

## 2025-04-15 NOTE — PLAN OF CARE
Mildred is alert and oriented x 4. He is voiding freely, No BM today. Patient denies pain, nausea or vomiting. Mildred is up self in the room. Neutropenic precautions active. Patient to follow up in one week for repeat labs. IV removed. Adequate for discharge.  Problem: Patient Centered Care  Goal: Patient preferences are identified and integrated in the patient's plan of care  Description: Interventions:- What would you like us to know as we care for you? I live with my wife and 2 kids  - Provide timely, complete, and accurate information to patient/family- Incorporate patient and family knowledge, values, beliefs, and cultural backgrounds into the planning and delivery of care- Encourage patient/family to participate in care and decision-making at the level they choose- Honor patient and family perspectives and choices  Outcome: Adequate for Discharge     Problem: RISK FOR INFECTION - ADULT  Goal: Absence of fever/infection during anticipated neutropenic period  Description: INTERVENTIONS- Monitor WBC- Administer growth factors as ordered- Implement neutropenic guidelines  Outcome: Adequate for Discharge     Problem: SAFETY ADULT - FALL  Goal: Free from fall injury  Description: INTERVENTIONS:- Assess pt frequently for physical needs- Identify cognitive and physical deficits and behaviors that affect risk of falls.- [place] fall precautions as indicated by assessment.- Educate pt/family on patient safety including physical limitations- Instruct pt to call for assistance with activity based on assessment- Modify environment to reduce risk of injury- Provide assistive devices as appropriate- Consider OT/PT consult to assist with strengthening/mobility- Encourage toileting schedule  Outcome: Adequate for Discharge     Problem: METABOLIC/FLUID AND ELECTROLYTES - ADULT  Goal: Electrolytes maintained within normal limits  Description: INTERVENTIONS:- Monitor labs and rhythm and assess patient for signs and symptoms of  electrolyte imbalances- Administer electrolyte replacement as ordered- Monitor response to electrolyte replacements, including rhythm and repeat lab results as appropriate- Fluid restriction as ordered- Instruct patient on fluid and nutrition restrictions as appropriate  Outcome: Adequate for Discharge  Goal: Hemodynamic stability and optimal renal function maintained  Description: INTERVENTIONS:- Monitor labs and assess for signs and symptoms of volume excess or deficit- Monitor intake, output and patient weight- Monitor urine specific gravity, serum osmolarity and serum sodium as indicated or ordered- Monitor response to interventions for patient's volume status, including labs, urine output, blood pressure (other measures as available)- Encourage oral intake as appropriate- Instruct patient on fluid and nutrition restrictions as appropriate  Outcome: Adequate for Discharge

## 2025-04-16 ENCOUNTER — TELEPHONE (OUTPATIENT)
Facility: CLINIC | Age: 60
End: 2025-04-16

## 2025-04-16 ENCOUNTER — PATIENT OUTREACH (OUTPATIENT)
Dept: CASE MANAGEMENT | Age: 60
End: 2025-04-16

## 2025-04-16 LAB
CMV IGG AB: <0.6 U/ML
CMV IGM AB: <30 AU/ML
EBV NA IGG SER QL IA: POSITIVE
EBV VCA IGG SER QL IA: POSITIVE
EBV VCA IGM SER QL IA: NEGATIVE

## 2025-04-16 NOTE — PROGRESS NOTES
Transitional Care Management   Discharge Date: 4/15/25  Contact Date: 2025    Assessment:  (Insert appropriate Smartphrase below after completing flowsheet)  TCM Initial Assessment    General:  Assessment completed with: Patient  Patient Subjective: The patient states he is doing well. He does not have any recurrent symtoms at this time.  Chief Complaint: Neutropenic fever  Verify patient name and  with patient/ caregiver: Yes    Hospital Stay/Discharge:  Tell me what you understand of why you were in the hospital or emergency department: The patient reports his white blood cell count was low.  Prior to leaving the hospital were your Discharge Instructions reviewed with you?: Yes  Did you receive a copy of your written Discharge Instructions?: Yes  What questions do you have about your Discharge Instructions?: None  Do you feel better or worse since you left the hospital or emergency department?: Better    Follow - Up Appointment:  Do you have a follow-up appointment?: No  Are there any barriers to getting to your follow-up appointment?: No    Home Health/DME:  Prior to leaving the hospital was Home Health (HH) arranged for you?: No     Prior to leaving the hospital or emergency department was Durable Medical Equipment (DME), medical supplies, or infusions arranged for you?: No  Are DME/medical supply/infusions needs identified by staff during this assessment?: No     Medications/Diet:       Were you given a different diet per your Discharge Instructions?: No     Questions/Concerns:  Do you have any questions or concerns that have not been discussed?: No        Follow-up Appointments:  Your appointments       Date & Time Appointment Department (Center)    2025 4:30 PM CDT Follow Up Visit [facility]-C [facility]        2025 4:00 PM CDT Lab Visit with BERNY WILL [facility]2 Lorena Jang [facility]  [facility] ([facility])        Apr 23, 2025 5:00 PM CDT HEMATOLOGY ONCOLOGY FOLLOW UP with Karthik Keating DO [facility] Hematology Oncology [place] ([facility])        Apr 23, 2025 6:30 PM CDT Hospital Follow Up with Luz Evans APRN [facility] ([facility])        May 28, 2025 6:15 PM CDT Follow Up Visit with Afshin Adkins DPM [facility] ([facility])        Sep 13, 2025 10:20 AM CDT Adult Physical with Jose Antonio Purcell MD [facility] ([facility])    PLEASE NOTE - Most insurances allow a Complete Physical once every 366 days. Please schedule accordingly.    Please arrive 15 minutes prior to your scheduled appointment. Please also bring your Insurance card, Photo ID, and your medication bottles or a list of your current medication.    If you no longer require this appointment, please contact your physician office to cancel.              [facility]  [address] [address]  [phone] [facility]  [address]  [facility] [address]  [phone] [facility]  [address]  [place] [address]  [phone]    Lorena ALCALA [facility]  [address]  [place] [address]  [phone] Lorena ALCALA [facility] Hematology Oncology [facility]  [address]  [place] [address]  [phone]            Transitional Care Clinic  Was TCC Ordered: No  Was TCC Scheduled:  No   - If yes: []  Advised patient to bring all medications and blood glucose meter/supplies if applicable.    Primary Care Provider (If no TCC appointment)  Does patient already have a PCP appointment scheduled? No  Care Manager Scheduled PCP office TCM appointment with patient   -If no appointment scheduled: Explain     Specialist  Does the patient have any other follow-up appointment(s) that need to be scheduled? Yes   -If yes: Care Manager reviewed upcoming specialist appointments with patient: Yes   -Does the patient need assistance scheduling appointment(s): No      Book By Date: 4/29/25

## 2025-04-16 NOTE — TELEPHONE ENCOUNTER
Patient contacted.  I offered several appointment dates.  He wants 4:30 pm and not available for the opening in April, so accepted below appointment and given office directions.    Your Appointments      Monday May 05, 2025 4:30 PM  Consult with Ami Kim PA-C  [facility] ([facility] [address]  [facility] [place] [address]  [phone]

## 2025-04-22 DIAGNOSIS — D70.9 NEUTROPENIC FEVER: Primary | ICD-10-CM

## 2025-04-22 DIAGNOSIS — R50.81 NEUTROPENIC FEVER: Primary | ICD-10-CM

## 2025-04-23 ENCOUNTER — OFFICE VISIT (OUTPATIENT)
Age: 60
End: 2025-04-23
Attending: STUDENT IN AN ORGANIZED HEALTH CARE EDUCATION/TRAINING PROGRAM
Payer: COMMERCIAL

## 2025-04-23 VITALS
OXYGEN SATURATION: 97 % | WEIGHT: 157 LBS | HEART RATE: 77 BPM | RESPIRATION RATE: 16 BRPM | HEIGHT: 70 IN | DIASTOLIC BLOOD PRESSURE: 84 MMHG | BODY MASS INDEX: 22.48 KG/M2 | TEMPERATURE: 99 F | SYSTOLIC BLOOD PRESSURE: 131 MMHG

## 2025-04-23 DIAGNOSIS — D70.9 NEUTROPENIC FEVER: ICD-10-CM

## 2025-04-23 DIAGNOSIS — R50.81 NEUTROPENIC FEVER: ICD-10-CM

## 2025-04-23 DIAGNOSIS — D70.9 NEUTROPENIA, UNSPECIFIED TYPE: Primary | ICD-10-CM

## 2025-04-23 LAB
BASOPHILS # BLD AUTO: 0.04 X10(3) UL (ref 0–0.2)
BASOPHILS NFR BLD AUTO: 1.7 %
DEPRECATED RDW RBC AUTO: 45.2 FL (ref 35.1–46.3)
EOSINOPHIL # BLD AUTO: 0.05 X10(3) UL (ref 0–0.7)
EOSINOPHIL NFR BLD AUTO: 2.1 %
ERYTHROCYTE [DISTWIDTH] IN BLOOD BY AUTOMATED COUNT: 13.8 % (ref 11–15)
HCT VFR BLD AUTO: 42.1 % (ref 39–53)
HGB BLD-MCNC: 13.7 G/DL (ref 13–17.5)
IMM GRANULOCYTES # BLD AUTO: 0.01 X10(3) UL (ref 0–1)
IMM GRANULOCYTES NFR BLD: 0.4 %
LYMPHOCYTES # BLD AUTO: 0.91 X10(3) UL (ref 1–4)
LYMPHOCYTES NFR BLD AUTO: 38.6 %
MCH RBC QN AUTO: 29 PG (ref 26–34)
MCHC RBC AUTO-ENTMCNC: 32.5 G/DL (ref 31–37)
MCV RBC AUTO: 89 FL (ref 80–100)
MONOCYTES # BLD AUTO: 0.64 X10(3) UL (ref 0.1–1)
MONOCYTES NFR BLD AUTO: 27.1 %
NEUTROPHILS # BLD AUTO: 0.71 X10 (3) UL (ref 1.5–7.7)
NEUTROPHILS # BLD AUTO: 0.71 X10(3) UL (ref 1.5–7.7)
NEUTROPHILS NFR BLD AUTO: 30.1 %
PLATELET # BLD AUTO: 171 10(3)UL (ref 150–450)
RBC # BLD AUTO: 4.73 X10(6)UL (ref 4.3–5.7)
WBC # BLD AUTO: 2.4 X10(3) UL (ref 4–11)

## 2025-04-24 NOTE — PROGRESS NOTES
[facility] Hematology Oncology   Hematology Progress Note    Patient Name: Wendi Mart   YOB: 1965   Medical Record Number: [id number]    Subjective:   Wendi Mart is a 59 year old male who presents for hematology follow-up regarding recent hospitalization for severe neutropenia thought to be viral induced.    He is feeling very well at this time states he is operating at his baseline status of health without any complaints, fatigue, fevers or other concerns.    Review of Systems:  Hematology/Oncology ROS performed and negative except as above in HPI    History/Other:   Current Medications:  Current Medications[1]    Allergies:   Allergies[2]    Objective:   Blood pressure 131/84, pulse 77, temperature 98.5 °F (36.9 °C), temperature source Oral, resp. rate 16, height 1.778 m (5' 10\"), weight 71.2 kg (157 lb), SpO2 97%.  Physical Exam:  General: A&Ox3, NAD  HEENT: PERRL, OP clear  CV: RRR  Pulm: normal effort  Extremities: no edema  Neurological: grossly intact    Results:   Labs:  Lab Results   Component Value Date    WBC 2.4 (L) 04/23/2025    HGB 13.7 04/23/2025    HCT 42.1 04/23/2025    .0 04/23/2025    CREATSERUM 0.90 04/15/2025    BUN 20 04/15/2025     04/15/2025    K 4.1 04/15/2025     04/15/2025    CO2 26.0 04/15/2025     (H) 04/15/2025    CA 8.7 04/15/2025    ALB 4.3 04/13/2025    ALKPHO 71 04/13/2025    BILT 0.7 04/13/2025    TP 6.7 04/13/2025    AST 24 04/13/2025    ALT 25 04/13/2025    PTT 36.2 (H) 04/13/2025    INR 1.07 04/13/2025    TSH 0.955 04/13/2025     05/16/2019    PSA 0.6 12/13/2017    DDIMER 0.65 (H) 03/24/2022    ESRML 30 (H) 04/15/2025    CRP 1.30 (H) 04/15/2025    MG 2.0 03/28/2022    TROP <0.045 05/16/2019    B12 919 (H) 04/15/2025       Assessment & Plan:   Wendi Mildred Mart is a 59 year old male who presents for hematology follow-up regarding recent hospitalization for severe neutropenia thought to be viral  induced.    ANC improving, from ruddy 0.09 on 4/13 up to 0.71 today, 4/23.  Hemoglobin remains normal and platelet count has normalized.  The patient feels well without any acute concerns.  We will repeat a CBC in 1 month to ensure complete resolution, he knows to call with any questions or concerns that may arise in the interim.  Pending normalization of CBC, specifically ANC, he may then follow-up as needed.    TOREY Keating, [facility] Hematology/Oncology  [facility]     This note was created using a voice-recognition transcribing system. Incorrect words or phrases may have been missed during proofreading. Please interpret accordingly.         [1]    PANTOPRAZOLE 40 MG Oral Tab EC TAKE 1 TABLET(40 MG) BY MOUTH BEFORE BREAKFAST (Patient taking differently: Take 1 tablet (40 mg total) by mouth daily as needed (indigestion).) 90 tablet 0    carvedilol 6.25 MG Oral Tab Take 1 tablet (6.25 mg total) by mouth 2 (two) times daily with meals. 180 tablet 0    Omega-3 Fatty Acids (FISH OIL OR) Take by mouth.      montelukast 10 MG Oral Tab Take 1 tablet (10 mg total) by mouth daily. (Patient taking differently: Take 1 tablet (10 mg total) by mouth daily as needed (allergies).) 90 tablet 3    Fluocin-Hydroquinone-Tretinoin (TRI-LAITH) 0.01-4-0.05 % External Cream Use qohs as directed 30 g 5    triamcinolone 0.1 % External Cream Apply 1 Application topically 2 (two) times daily. To areas of itching/ eczema 80 g 3    saccharomyces boulardii (FLORASTOR) 250 MG Oral Cap Take 1 capsule (250 mg total) by mouth 2 (two) times daily. (Patient taking differently: Take 1 capsule (250 mg total) by mouth daily.) 60 capsule 0    rosuvastatin 40 MG Oral Tab Take 1 tablet (40 mg total) by mouth nightly. 30 tablet 3    Multiple Vitamin Oral Tab Take 1 tablet by mouth.      aspirin 81 MG Oral Tab EC Take 1 tablet (81 mg total) by mouth in the morning.     [2]   Allergies  Allergen Reactions    Seasonal  OTHER (SEE COMMENTS)     Seasonal spring

## 2025-05-13 RX ORDER — CARVEDILOL 6.25 MG/1
6.25 TABLET ORAL 2 TIMES DAILY WITH MEALS
Qty: 180 TABLET | Refills: 3 | Status: SHIPPED | OUTPATIENT
Start: 2025-05-13

## 2025-05-13 NOTE — TELEPHONE ENCOUNTER
Refill passed per Clinic protocol.  Requested Prescriptions   Pending Prescriptions Disp Refills    CARVEDILOL 6.25 MG Oral Tab [Pharmacy Med Name: CARVEDILOL 6.25MG TABLETS] 180 tablet 0     Sig: TAKE 1 TABLET(6.25 MG) BY MOUTH TWICE DAILY WITH MEALS       Hypertension Medications Protocol Passed - 5/13/2025 12:07 PM        Passed - CMP or BMP in past 12 months        Passed - Last BP reading less than 140/90     BP Readings from Last 1 Encounters:   04/23/25 131/84               Passed - In person appointment or virtual visit in the past 12 mos or appointment in next 3 mos     Recent Outpatient Visits              2 weeks ago Neutropenic fever    [facility] [place]    Nurse Only    2 weeks ago Neutropenia, unspecified type    [facility] Hematology Oncology [place] Karthik Keating DO    Office Visit    2 months ago Routine physical examination    [facility] Jose Antonio Purcell MD    Office Visit    5 months ago Tinnitus of both ears    [facility] Roxy Heath AUD    Office Visit    5 months ago Subjective tinnitus, bilateral    [facility] Tha Tompkins MD    Office Visit          Future Appointments         Provider Department Appt Notes    In 2 weeks Afshin Adkins DPM [facility]     In 4 months Jose Antonio Purcell MD [facility]                    Passed - EGFRCR or GFRNAA > 50     GFR Evaluation  EGFRCR: 100 , resulted on 4/15/2025          Passed - Medication is active on med list

## 2025-05-23 NOTE — PROGRESS NOTES
[facility] - Gastroenterology                                                                                                               Reason for consult: Patient presents with:  Consult: A lot of gas & burping lately      Requesting physician or provider: Stefani Alford MD      HPI:   Yo Javed is a 56year old year-old male with history of BMI 20, hypertension, hyperlipidemia, coronary artery disease s/p placement of a drug eluting stent in 2015 on dual anti-platelet therapy, eczema, who presents for flatulence and belching. Patient previously known to Dr. Gissel Farris and Dr. Lisa Smalls. Last visit March 2022 for chest pain. He was advised to follow up with cardiology for chest pain because it did not seem like a GI cause. He was taking pantoprazole 40 mg daily. Patient underwent coronary artery bypass grafting in March 2931 complicated by post bypass pericarditis. Today, he has daily flateulence and belching. He finds it to be more uncomfortable. Can feel bloated. No abdominal pain. No nausea or vomiting. No acid reflux. No dysphagia. No globus. Moves bowels daily without strain. No brbpr or melena. No decreased appetite or unintentional weight loss. Has been taking a daily probiotic and uses simethicon to help with symptoms. NSAIDS/ASA: daily baby ASA  Tobacco: none  Alcohol: none  Marijuana: none  Illicit drugs: none    FH GI malignancyNo  Celiac disease- no  Liver dise    No history of adverse reaction to sedation  No EUGENIO  On daily ASA  No pacemaker/defibrillator  No pain medications and/or sleep aides      Prior endoscopies:  Colonoscopy 11/2021 with Dr. Gissel Farris for history of adenomatous colon polyp found to have a small colon polyp removed and hemorrhoids.      Wt Readings from Last 6 Encounters:  07/14/23 : 143 lb (64.9 kg)  02/11/23 : 141 lb (64 kg)  11/12/22 : 140 lb (63.5 kg)  08/04/22 : 139 lb (63 kg)  04/20/22 : 139 lb (63 kg)  03/28/22 : 137 lb 9.1 oz (62.4 kg)       History, Medications, Allergies, ROS:      Past Medical History:   Diagnosis Date    Atherosclerosis of coronary artery 06/26/2015    Stenting of Left Circumflex and Ramus    Coronary atherosclerosis     Essential hypertension 12/2006    Heart disease     High blood pressure     High cholesterol     Hyperlipidemia       Past Surgical History:   Procedure Laterality Date    ANGIOPLASTY (CORONARY)  8/2015    CABG      x 4    CATH DRUG ELUTING STENT  06/02/2015    COLONOSCOPY N/A 07/03/2018    Procedure: COLONOSCOPY;  Surgeon: Luz Elena Jeronimo MD;  Location: [address] [place] [address] ENDOSCOPY    INCISION OF [address] [place] [address]    SKIN SURGERY Left 12/10/2020    epidermal inclusion cyst, lower back      Family Hx:   Family History   Problem Relation Age of Onset    Heart Disease Father         premature CAD (cause of death)    Lipids Mother         hyperlipidemia    Stroke Paternal Grandmother         CVA    Diabetes Other         family h/o    Stroke Other         family h/o CVA    Stroke Cousin         CVA    Glaucoma Neg     Macular degeneration Neg       Social History:   Social History     Socioeconomic History    Marital status:     Number of children: 2   Occupational History    Occupation: DATA  ANALYST     Employer: [employer]   Tobacco Use    Smoking status: Never    Smokeless tobacco: Never   Vaping Use    Vaping Use: Never used   Substance and Sexual Activity    Alcohol use: No    Drug use: No    Sexual activity: Yes     Partners: Female   Other Topics Concern    Caffeine Concern Yes     Comment: coffee,1 cup    Reaction to local anesthetic No        Medications (Active prior to today's visit):  Current Outpatient Medications   Medication Sig Dispense Refill    finasteride 1 MG Oral Tab Take 1 tablet (1 mg total) by mouth daily.  30 tablet 5    carvedilol 3.125 MG Oral Tab Take 1 tablet (3.125 mg) in the morning and 2 tablets (total of 6.25 mg) in the evening 180 tablet Fluocin-Hydroquinone-Tretinoin (TRI-LAITH) 0.01-4-0.05 % External Cream Use qAM as directed to spots 30 g 6    Tazarotene 0.1 % External Cream Use at bedtime as directed 60 g 6    saccharomyces boulardii (FLORASTOR) 250 MG Oral Cap Take 1 capsule (250 mg total) by mouth 2 (two) times daily. 60 capsule 0    rosuvastatin 40 MG Oral Tab Take 1 tablet (40 mg total) by mouth nightly. 30 tablet 3    Multiple Vitamin Oral Tab Take 1 tablet by mouth. aspirin 81 MG Oral Tab EC Take 1 tablet (81 mg total) by mouth daily. Allergies:    Metoprolol              OTHER (SEE COMMENTS)  Seasonal                OTHER (SEE COMMENTS)    Comment:Seasonal spring    ROS:   CONSTITUTIONAL: negative for fevers, chills, sweats and weight loss  EYES Negative for red eyes, yellow eyes, changes in vision  HEENT: Negative for dysphagia and hoarseness  RESPIRATORY: Negative for cough and shortness of breath  CARDIOVASCULAR: Negative for chest pain, palpitations  GASTROINTESTINAL: See HPI  GENITOURINARY: Negative for dysuria and frequency  MUSCULOSKELETAL: Negative for arthralgias and myalgias  NEUROLOGICAL: Negative for dizziness and headaches  BEHAVIOR/PSYCH: Negative for anxiety and poor appetite    PHYSICAL EXAM:   Blood pressure 130/82, pulse 58, height 5' 10\" (1.778 m), weight 143 lb (64.9 kg). GEN: WD/WN, NAD  HEENT: Supple symmetrical, trachea midline  CV: RRR, the extremities are warm and well perfused   LUNGS: No increased work of breathing  ABDOMEN: No scars, normal bowel sounds, soft, non-tender, non-distended no rebound or guarding, no masses, no hepatomegaly  MSK: No redness, no warmth, no swelling of joints  SKIN: No jaundice, no erythema, no rashes  HEMATOLOGIC: No bleeding, no bruising  NEURO: Alert and interactive, normal gait    Labs/Imaging/Procedures:     Patient's pertinent labs and imaging were reviewed and discussed with patient today.      Lab Results   Component Value Date    WBC 5.4 02/13/2023    RBC 4.89 02/13/2023    HGB 14.8 02/13/2023    HCT 45.0 02/13/2023    MCV 92.0 02/13/2023    MCH 30.3 02/13/2023    MCHC 32.9 02/13/2023    RDW 12.6 02/13/2023     02/13/2023    MPV 8.5 12/13/2017        Lab Results   Component Value Date     (H) 02/13/2023    BUN 26 (H) 02/13/2023    BUNCREA 24 (H) 02/13/2023    CREATSERUM 1.08 02/13/2023    ANIONGAP 4 03/28/2022    GFRNAA 107 03/28/2022    GFRAA 124 03/28/2022    CA 8.9 02/13/2023    OSMOCALC 300 (H) 03/28/2022    ALKPHO 60 02/13/2023    AST 20 02/13/2023    ALT 20 02/13/2023    BILT 0.6 02/13/2023    TP 6.6 02/13/2023    ALB 4.2 02/13/2023    GLOBULIN 2.4 02/13/2023    AGRATIO 1.8 02/13/2023     02/13/2023    K 4.0 02/13/2023     02/13/2023    CO2 26 02/13/2023        No results found. .  ASSESSMENT/PLAN:   Norbert Hernandez is a 56year old year-old male with history of BMI 20, hypertension, hyperlipidemia, coronary artery disease s/p placement of a drug eluting stent in 2015 on dual anti-platelet therapy, eczema, who presents for flatulence and belching. #belching  #flatulence  Patient has intermittent belching and flatulence. He states it has seemed to be more problematic over last 6 months. Denies GERD, dysphagia, n/v. Denies changes in bowel habits, brbpr or melena. Discussed symptoms could be from stool burden or acid over production. Advised will test for h pylori today. If negative plan for starting acid reducer and follow up in 2 months.      Recommendation:   - will test for h pylori today   - will contact with results  - if negative will start PPI treatment  - can continue with elimination of dairy      Orders This Visit:  Orders Placed This Encounter      Helicobacter Pylori Breath Test, Adult      Meds This Visit:  Requested Prescriptions      No prescriptions requested or ordered in this encounter       Imaging & Referrals:  None      Dwayne Vega PA-C   7/14/2023        This note was partially prepared using Dragon Medical voice recognition dictation software. As a result, errors may occur. When identified, these errors have been corrected.  While every attempt is made to correct errors during dictation, discrepancies may still exist. Statement Selected

## 2025-05-30 ENCOUNTER — LAB ENCOUNTER (OUTPATIENT)
Dept: LAB | Facility: HOSPITAL | Age: 60
End: 2025-05-30
Attending: STUDENT IN AN ORGANIZED HEALTH CARE EDUCATION/TRAINING PROGRAM
Payer: COMMERCIAL

## 2025-05-30 DIAGNOSIS — D70.9 NEUTROPENIA, UNSPECIFIED TYPE: ICD-10-CM

## 2025-05-30 LAB
BASOPHILS # BLD AUTO: 0.06 X10(3) UL (ref 0–0.2)
BASOPHILS NFR BLD AUTO: 1 %
DEPRECATED RDW RBC AUTO: 47.7 FL (ref 35.1–46.3)
EOSINOPHIL # BLD AUTO: 0.12 X10(3) UL (ref 0–0.7)
EOSINOPHIL NFR BLD AUTO: 2 %
ERYTHROCYTE [DISTWIDTH] IN BLOOD BY AUTOMATED COUNT: 14.1 % (ref 11–15)
HCT VFR BLD AUTO: 44.3 % (ref 39–53)
HGB BLD-MCNC: 14.1 G/DL (ref 13–17.5)
IMM GRANULOCYTES # BLD AUTO: 0.01 X10(3) UL (ref 0–1)
IMM GRANULOCYTES NFR BLD: 0.2 %
LYMPHOCYTES # BLD AUTO: 1.4 X10(3) UL (ref 1–4)
LYMPHOCYTES NFR BLD AUTO: 23.2 %
MCH RBC QN AUTO: 29.2 PG (ref 26–34)
MCHC RBC AUTO-ENTMCNC: 31.8 G/DL (ref 31–37)
MCV RBC AUTO: 91.7 FL (ref 80–100)
MONOCYTES # BLD AUTO: 0.62 X10(3) UL (ref 0.1–1)
MONOCYTES NFR BLD AUTO: 10.3 %
NEUTROPHILS # BLD AUTO: 3.83 X10 (3) UL (ref 1.5–7.7)
NEUTROPHILS # BLD AUTO: 3.83 X10(3) UL (ref 1.5–7.7)
NEUTROPHILS NFR BLD AUTO: 63.3 %
PLATELET # BLD AUTO: 178 10(3)UL (ref 150–450)
RBC # BLD AUTO: 4.83 X10(6)UL (ref 4.3–5.7)
WBC # BLD AUTO: 6 X10(3) UL (ref 4–11)

## 2025-05-30 PROCEDURE — 36415 COLL VENOUS BLD VENIPUNCTURE: CPT

## 2025-05-30 PROCEDURE — 85025 COMPLETE CBC W/AUTO DIFF WBC: CPT

## 2025-06-11 ENCOUNTER — TELEMEDICINE (OUTPATIENT)
Dept: INTERNAL MEDICINE CLINIC | Facility: CLINIC | Age: 60
End: 2025-06-11
Payer: COMMERCIAL

## 2025-06-11 DIAGNOSIS — K29.70 GASTRITIS WITHOUT BLEEDING, UNSPECIFIED CHRONICITY, UNSPECIFIED GASTRITIS TYPE: Primary | ICD-10-CM

## 2025-06-11 PROCEDURE — 98005 SYNCH AUDIO-VIDEO EST LOW 20: CPT | Performed by: INTERNAL MEDICINE

## 2025-06-11 NOTE — PROGRESS NOTES
Virtual Virtual Check-In    Wendi Mart verbally consents to a Virtual Video Check-In service on 06/11/25. Patient understands and accepts financial responsibility for any deductible, co-insurance and/or co-pays associated with this service. This visit is conducted using Telemedicine with live, interactive video and audio.     I spoke with Wendi Mart by secure video chat, verified date of birth, and discussed their current concerns:   Duration: Abdominal discomfort    HPI  59-year-old gentleman requesting a telemedicine consultation for abdominal discomfort.  Started few days back.  He exercises.  No associated exercise.  Denies any chest pain heaviness shortness of breath.  Denies any blood in the stool or blood in the urine.  He took pantoprazole with some relief.  He also reports abdominal bloating.        Reviewed Active Problems:  Patient Active Problem List    Diagnosis    Neutropenic fever    COVID-19    Gas bloat syndrome    CAD (coronary artery disease)    Poikiloderma    Abdominal bloating    Post-inflammatory hyperpigmentation    Pruritic disorder    Nummular eczema    Hyperopia with presbyopia of both eyes    Subjective visual disturbance of both eyes    Meibomian gland dysfunction (MGD) of both eyes    Coronary artery disease involving native heart with angina pectoris    Abnormal findings on diagnostic imaging of cardiovascular system    Essential hypertension    Hyperlipidemia      Reviewed Past Medical History:  Past Medical History[1]   Reviewed Family History:  Family History[2]    Reviewed Social History:  Short Social Hx on File[3]   Reviewed Current Medications:  Current Medications[4]       Physical Exam  There were no vitals filed for this visit.  Physical Exam   Constitutional:       General: He is not in acute distress.     Appearance: Normal appearance.   HENT:      Head: Normocephalic and atraumatic.      Nose: Nose normal.   Neurological:      Mental Status: He is alert  and oriented to person, place, and time.   Psychiatric:         Behavior: Behavior normal.         Thought Content: Thought content normal.         Judgment: Judgment normal.     Patient self exam  No tenderness in the abdomen    Diagnosis:  1. Gastritis without bleeding, unspecified chronicity, unspecified gastritis type  Avoid spicy foods and greasy foods.  Take pantoprazole 2 times a day for 3 days and then once a day for 10 days.  If no improvement, he will contact us.  If there is any worsening symptoms, he will go to the emergency room.,     Plan: As above.    Follow up: No follow-ups on file.    Please note that the following visit was completed using two-way, real-time interactive audio and/or video communication.  This has been done in good mile to provide continuity of care in the best interest of the provider-patient relationship, due to the ongoing public health crisis/national emergency and because of restrictions of visitation.  There are limitations of this visit as no physical exam could be performed.  Every conscious effort was taken to allow for sufficient and adequate time.  This billing was spent on reviewing labs, medications, radiology tests and decision making.  Appropriate medical decision-making and tests are ordered as detailed in the plan of care above. Wendi Mart understands video evaluation is not a substitute for in person examination or emergency care. Patient advised to go to ER or call 911 for worsening symptoms or acute distress.     This note was prepared using Dragon Medical voice recognition dictation software. As a result errors may occur. When identified these errors have been corrected. While every attempt is made to correct errors during dictation discrepancies may still exist.  Jose Antonio Burnett MD         [1]   Past Medical History:   Atherosclerosis of coronary artery    Stenting of Left Circumflex and Ramus    Coronary atherosclerosis    Essential hypertension     Heart disease    High blood pressure    High cholesterol    Hyperlipidemia   [2]   Family History  Problem Relation Age of Onset    Heart Disease Father         premature CAD (cause of death)    Lipids Mother         hyperlipidemia    Stroke Paternal Grandmother         CVA    Diabetes Other         family h/o    Stroke Other         family h/o CVA    Stroke Cousin         CVA    Glaucoma Neg     Macular degeneration Neg    [3]   Social History  Socioeconomic History    Marital status:     Number of children: 2   Occupational History    Occupation: DATA  ANALYST     Employer: [employer]   Tobacco Use    Smoking status: Never    Smokeless tobacco: Never   Vaping Use    Vaping status: Never Used   Substance and Sexual Activity    Alcohol use: No    Drug use: No    Sexual activity: Yes     Partners: Female   Other Topics Concern    Caffeine Concern Yes     Comment: coffee,1 cup    Reaction to local anesthetic No    Pt has a pacemaker No    Pt has a defibrillator No     Social Drivers of Health     Food Insecurity: No Food Insecurity (4/13/2025)    NCSS - Food Insecurity     Worried About Running Out of Food in the Last Year: No     Ran Out of Food in the Last Year: No   Transportation Needs: No Transportation Needs (4/13/2025)    NCSS - Transportation     Lack of Transportation: No   Housing Stability: Not At Risk (4/13/2025)    NCSS - Housing/Utilities     Has Housing: Yes     Worried About Losing Housing: No     Unable to Get Utilities: No   [4]   Current Outpatient Medications   Medication Sig Dispense Refill    carvedilol 6.25 MG Oral Tab Take 1 tablet (6.25 mg total) by mouth 2 (two) times daily with meals. 180 tablet 3    PANTOPRAZOLE 40 MG Oral Tab EC TAKE 1 TABLET(40 MG) BY MOUTH BEFORE BREAKFAST (Patient taking differently: Take 1 tablet (40 mg total) by mouth daily as needed (indigestion).) 90 tablet 0    Ciclopirox 8 % External Solution Apply topically to affected site daily, wash off once per week 19.8 mL  2    Omega-3 Fatty Acids (FISH OIL OR) Take by mouth.      montelukast 10 MG Oral Tab Take 1 tablet (10 mg total) by mouth daily. (Patient taking differently: Take 1 tablet (10 mg total) by mouth daily as needed (allergies).) 90 tablet 3    Tazarotene 0.1 % External Cream USE AT BEDTIME AS DIRECTED 30 g 3    Fluocin-Hydroquinone-Tretinoin (TRI-LAITH) 0.01-4-0.05 % External Cream Use qohs as directed 30 g 5    triamcinolone 0.1 % External Cream Apply 1 Application topically 2 (two) times daily. To areas of itching/ eczema 80 g 3    saccharomyces boulardii (FLORASTOR) 250 MG Oral Cap Take 1 capsule (250 mg total) by mouth 2 (two) times daily. (Patient taking differently: Take 1 capsule (250 mg total) by mouth daily.) 60 capsule 0    rosuvastatin 40 MG Oral Tab Take 1 tablet (40 mg total) by mouth nightly. 30 tablet 3    Multiple Vitamin Oral Tab Take 1 tablet by mouth.      aspirin 81 MG Oral Tab EC Take 1 tablet (81 mg total) by mouth in the morning.

## 2025-06-11 NOTE — PATIENT INSTRUCTIONS
Avoid spicy foods and greasy foods.  Take pantoprazole 2 times a day for 3 days and then once a day for 10 days.  If you develop worsening pain, or if child have new symptoms such as vomiting, blood in the stool, fever or chills then  you  should go to the emergency room.

## 2025-06-20 ENCOUNTER — TELEPHONE (OUTPATIENT)
Facility: CLINIC | Age: 60
End: 2025-06-20

## 2025-06-20 NOTE — TELEPHONE ENCOUNTER
I offered sooner appointments with NITESH since Ami does not have office hours open until 7/14/25  He rather book with Ami when she returns.  Accepted below appointment and given office directions.  Your Appointments      Monday July 14, 2025 4:00 PM  Follow Up Visit with Ami Kim PA-C  [facility] [address] [address]  [phone]

## 2025-06-25 ENCOUNTER — TELEPHONE (OUTPATIENT)
Facility: CLINIC | Age: 60
End: 2025-06-25

## 2025-06-25 NOTE — TELEPHONE ENCOUNTER
Patient contacted, he just wanted to change the time of his appointment.  Accepted below appointment instead  Your Appointments      Monday July 14, 2025 8:30 AM  Follow Up Visit with Ami Kim PA-C  [facility], [facility] ([facility]) [facility] [address] [address]  [phone]

## 2025-07-11 NOTE — PROGRESS NOTES
[facility] - Gastroenterology                                                                                                               Reason for consult:   Chief Complaint   Patient presents with    Follow - Up     Stomach pain/ gassy / Last CLN about 2 years ago        Requesting physician or provider: Jose Antonio Purcell MD      HPI:   Wendi Mart is a 59 year old year-old male with history of BMI 20, hypertension, hyperlipidemia, coronary artery disease s/p placement of a drug eluting stent in 2015 on dual anti-platelet therapy, eczema, who presents for follow up gas and bloating.      Patient previously known to Dr. Shi and Dr. Mantilla. Visit March 2022 for chest pain. He was advised to follow up with cardiology for chest pain because it did not seem like a GI cause. He was taking pantoprazole 40 mg daily. Patient underwent coronary artery bypass grafting in March 2023 complicated by post bypass pericarditis.     Follow up  -he can have good and bad days  -he notes increased gas   -took pantoprazole 40 mg for 5 months and did not get better  -has abdominal pain in the middle of abdomen   -pain worse at night  -triggered by meals   -has been monitoring food intake  -no nausea or vomiting  -appetite has been good  -weight has been stable  -no dysphagia or globus    -no increased acid refluxes  -has 2 BM per day   -small piece like BM, does not feel he fully evacuates  -brown in color  -no bright red blood or dark black stools  -if he has a BM the bloating improves  -when taking the pantoprazole does have improvement in symptoms  -can completely go away   -notes increased stress often irritates it        NSAIDS/ASA: daily baby ASA  Tobacco: none  Alcohol: none  Marijuana: none  Illicit drugs: none     FH GI malignancyNo  Celiac disease- no  Liver dise     No history of adverse reaction to sedation  No EUGENIO  On daily ASA  No pacemaker/defibrillator  No pain  medications and/or sleep aides        Prior endoscopies:  Colonoscopy 11/2021 with Dr. Shi for history of adenomatous colon polyp found to have a small colon polyp removed and hemorrhoids.        Wt Readings from Last 6 Encounters:   07/14/25 157 lb (71.2 kg)   04/23/25 157 lb (71.2 kg)   04/13/25 154 lb (69.9 kg)   03/08/25 158 lb (71.7 kg)   11/18/24 154 lb (69.9 kg)   08/17/24 154 lb 6 oz (70 kg)        History, Medications, Allergies, ROS:      Past Medical History[1]   Past Surgical History[2]   Family Hx: Family History[3]   Social History: Short Social Hx on File[4]     Medications (Active prior to today's visit):  Current Medications[5]    Allergies:  Allergies[6]    ROS:   CONSTITUTIONAL: negative for fevers, chills, sweats and weight loss  EYES Negative for red eyes, yellow eyes, changes in vision  HEENT: Negative for dysphagia and hoarseness  RESPIRATORY: Negative for cough and shortness of breath  CARDIOVASCULAR: Negative for chest pain, palpitations  GASTROINTESTINAL: See HPI  GENITOURINARY: Negative for dysuria and frequency  MUSCULOSKELETAL: Negative for arthralgias and myalgias  NEUROLOGICAL: Negative for dizziness and headaches  BEHAVIOR/PSYCH: Negative for anxiety and poor appetite    PHYSICAL EXAM:   Blood pressure 138/81, pulse 67, height 5' 10\" (1.778 m), weight 157 lb (71.2 kg).    GEN: WD/WN, NAD  HEENT: Supple symmetrical, trachea midline  CV: RRR, the extremities are warm and well perfused   LUNGS: No increased work of breathing  ABDOMEN: No scars, normal bowel sounds, soft, non-tender, non-distended no rebound or guarding, no masses, no hepatomegaly  MSK: No redness, no warmth, no swelling of joints  SKIN: No jaundice, no erythema, no rashes  HEMATOLOGIC: No bleeding, no bruising  NEURO: Alert and interactive, normal gait    Labs/Imaging/Procedures:     Patient's pertinent labs and imaging were reviewed and discussed with patient today.     Lab Results   Component Value Date    WBC 6.0  05/30/2025    RBC 4.83 05/30/2025    HGB 14.1 05/30/2025    HCT 44.3 05/30/2025    MCV 91.7 05/30/2025    MCH 29.2 05/30/2025    MCHC 31.8 05/30/2025    RDW 14.1 05/30/2025    .0 05/30/2025    MPV 8.5 12/13/2017        Lab Results   Component Value Date     (H) 04/15/2025    BUN 20 04/15/2025    BUNCREA 22.2 (H) 04/15/2025    CREATSERUM 0.90 04/15/2025    ANIONGAP 7 04/15/2025    GFRNAA 107 03/28/2022    GFRAA 124 03/28/2022    CA 8.7 04/15/2025    OSMOCALC 294 04/15/2025    ALKPHO 71 04/13/2025    AST 24 04/13/2025    ALT 25 04/13/2025    BILT 0.7 04/13/2025    TP 6.7 04/13/2025    ALB 4.3 04/13/2025    GLOBULIN 2.4 04/13/2025    AGRATIO 1.8 02/13/2023     04/15/2025    K 4.1 04/15/2025     04/15/2025    CO2 26.0 04/15/2025        No results found.          .  ASSESSMENT/PLAN:   Wendi Mart is a 59 year old year-old male with history of BMI 20, hypertension, hyperlipidemia, coronary artery disease s/p placement of a drug eluting stent in 2015 on dual anti-platelet therapy, eczema, who presents for follow up gas and bloating.     #bloating   #gas  -patient notes increased bloating and gas   -he notes most at night, can have food triggers  -has improved with BM and pantoprazole  -tries to monitor diet and avoid triggering foods  -lacks water and fiber in diet  -last cln 2021, no prior EGD  -etiology possibly constipation, gastritis, IBS, functional dyspepsia, biliary colic   -at this time plan for bowel regiment with miralax and trial of fd don, if minimal or no improvement will plan for ultrasound and lab work to evaluate for possible biliary cause +/- EGD   -patient agreeable to plan, all questions answered    #crc screening  -due for recall 11/2026   -5 year recall     Recommendations:  -Start Miralax 17g per day, titrate up or down for ideal frequency (start three times a day)  -Increase water intake to 64oz of water per day  -Increase exercise to 150 mins/week  -Do not ignore urge  to defecate  -Avoid artifical sugars  -send Ami a my chart message with symptoms update, if no improvement with improvement in bowel movements then can consider ultrasound     -start trial of FD don (purple box over the counter)    Orders This Visit:  No orders of the defined types were placed in this encounter.      Meds This Visit:  Requested Prescriptions      No prescriptions requested or ordered in this encounter       Imaging & Referrals:  None      Ami Kim PA-C   7/14/2025        This note was partially prepared using Dragon Medical voice recognition dictation software. As a result, errors may occur. When identified, these errors have been corrected. While every attempt is made to correct errors during dictation, discrepancies may still exist.          [1]   Past Medical History:   Atherosclerosis of coronary artery    Stenting of Left Circumflex and Ramus    Coronary atherosclerosis    Essential hypertension    Heart disease    High blood pressure    High cholesterol    Hyperlipidemia   [2]   Past Surgical History:  Procedure Laterality Date    Angioplasty (coronary)  8/2015    Cabg      x 4    Cath drug eluting stent  06/02/2015    Colonoscopy N/A 07/03/2018    Procedure: COLONOSCOPY;  Surgeon: Skinny Shi MD;  Location: [facility] ENDOSCOPY    Incision of pyloric muscle  1967    Skin surgery Left 12/10/2020    epidermal inclusion cyst, lower back   [3]   Family History  Problem Relation Age of Onset    Heart Disease Father         premature CAD (cause of death)    Lipids Mother         hyperlipidemia    Stroke Paternal Grandmother         CVA    Diabetes Other         family h/o    Stroke Other         family h/o CVA    Stroke Cousin         CVA    Glaucoma Neg     Macular degeneration Neg    [4]   Social History  Socioeconomic History    Marital status:     Number of children: 2   Occupational History    Occupation: DATA  ANALYST     Employer: [employer]   Tobacco Use    Smoking  status: Never    Smokeless tobacco: Never   Vaping Use    Vaping status: Never Used   Substance and Sexual Activity    Alcohol use: No    Drug use: No    Sexual activity: Yes     Partners: Female   Other Topics Concern    Caffeine Concern Yes     Comment: coffee,1 cup    Reaction to local anesthetic No    Pt has a pacemaker No    Pt has a defibrillator No     Social Drivers of Health     Food Insecurity: No Food Insecurity (4/13/2025)    NCSS - Food Insecurity     Worried About Running Out of Food in the Last Year: No     Ran Out of Food in the Last Year: No   Transportation Needs: No Transportation Needs (4/13/2025)    NCSS - Transportation     Lack of Transportation: No   Housing Stability: Not At Risk (4/13/2025)    NCSS - Housing/Utilities     Has Housing: Yes     Worried About Losing Housing: No     Unable to Get Utilities: No   [5]   Current Outpatient Medications   Medication Sig Dispense Refill    carvedilol 6.25 MG Oral Tab Take 1 tablet (6.25 mg total) by mouth 2 (two) times daily with meals. 180 tablet 3    PANTOPRAZOLE 40 MG Oral Tab EC TAKE 1 TABLET(40 MG) BY MOUTH BEFORE BREAKFAST (Patient taking differently: Take 1 tablet (40 mg total) by mouth daily as needed (indigestion).) 90 tablet 0    Omega-3 Fatty Acids (FISH OIL OR) Take by mouth.      Fluocin-Hydroquinone-Tretinoin (TRI-LAITH) 0.01-4-0.05 % External Cream Use qohs as directed 30 g 5    triamcinolone 0.1 % External Cream Apply 1 Application topically 2 (two) times daily. To areas of itching/ eczema 80 g 3    rosuvastatin 40 MG Oral Tab Take 1 tablet (40 mg total) by mouth nightly. 30 tablet 3    Multiple Vitamin Oral Tab Take 1 tablet by mouth.      aspirin 81 MG Oral Tab EC Take 1 tablet (81 mg total) by mouth in the morning.      Ciclopirox 8 % External Solution Apply topically to affected site daily, wash off once per week 19.8 mL 2    montelukast 10 MG Oral Tab Take 1 tablet (10 mg total) by mouth daily. (Patient taking differently: Take 1  tablet (10 mg total) by mouth daily as needed (allergies).) 90 tablet 3    Tazarotene 0.1 % External Cream USE AT BEDTIME AS DIRECTED 30 g 3    saccharomyces boulardii (FLORASTOR) 250 MG Oral Cap Take 1 capsule (250 mg total) by mouth 2 (two) times daily. (Patient taking differently: Take 1 capsule (250 mg total) by mouth daily.) 60 capsule 0   [6]   Allergies  Allergen Reactions    Seasonal OTHER (SEE COMMENTS)     Seasonal spring

## 2025-07-14 ENCOUNTER — OFFICE VISIT (OUTPATIENT)
Facility: CLINIC | Age: 60
End: 2025-07-14
Payer: COMMERCIAL

## 2025-07-14 ENCOUNTER — OFFICE VISIT (OUTPATIENT)
Dept: SURGERY | Facility: CLINIC | Age: 60
End: 2025-07-14
Payer: COMMERCIAL

## 2025-07-14 ENCOUNTER — TELEPHONE (OUTPATIENT)
Facility: CLINIC | Age: 60
End: 2025-07-14

## 2025-07-14 VITALS
WEIGHT: 157 LBS | HEIGHT: 70 IN | BODY MASS INDEX: 22.48 KG/M2 | SYSTOLIC BLOOD PRESSURE: 138 MMHG | DIASTOLIC BLOOD PRESSURE: 81 MMHG | HEART RATE: 67 BPM

## 2025-07-14 VITALS — HEART RATE: 60 BPM | DIASTOLIC BLOOD PRESSURE: 82 MMHG | SYSTOLIC BLOOD PRESSURE: 136 MMHG

## 2025-07-14 DIAGNOSIS — R14.0 BLOATING: Primary | ICD-10-CM

## 2025-07-14 DIAGNOSIS — R14.3 FLATULENCE: ICD-10-CM

## 2025-07-14 DIAGNOSIS — L72.0 EPIDERMOID CYST: Primary | ICD-10-CM

## 2025-07-14 PROCEDURE — 99203 OFFICE O/P NEW LOW 30 MIN: CPT | Performed by: SURGERY

## 2025-07-14 PROCEDURE — 3075F SYST BP GE 130 - 139MM HG: CPT | Performed by: SURGERY

## 2025-07-14 PROCEDURE — 3008F BODY MASS INDEX DOCD: CPT | Performed by: PHYSICIAN ASSISTANT

## 2025-07-14 PROCEDURE — 3075F SYST BP GE 130 - 139MM HG: CPT | Performed by: PHYSICIAN ASSISTANT

## 2025-07-14 PROCEDURE — 99214 OFFICE O/P EST MOD 30 MIN: CPT | Performed by: PHYSICIAN ASSISTANT

## 2025-07-14 PROCEDURE — 3079F DIAST BP 80-89 MM HG: CPT | Performed by: SURGERY

## 2025-07-14 PROCEDURE — 3079F DIAST BP 80-89 MM HG: CPT | Performed by: PHYSICIAN ASSISTANT

## 2025-07-14 NOTE — PATIENT INSTRUCTIONS
Recommendations:  -Start Miralax 17g per day, titrate up or down for ideal frequency (start three times a day)  -Increase water intake to 64oz of water per day  -Increase exercise to 150 mins/week  -Do not ignore urge to defecate  -Avoid artifical sugars  -send Ami a my chart message with symptoms update, if no improvement with improvement in bowel movements then can consider ultrasound     -start trial of FD don (purple box over the counter)

## 2025-07-14 NOTE — TELEPHONE ENCOUNTER
Patient wanted to know if he should continue taking Pantoprazole in addition to the recommendations received today.  Please call.  Thank you.

## 2025-07-14 NOTE — H&P
History and Physical      HPI     Chief Complaint   Patient presents with    Cyst     Pt states tp have a cyst on right shoulder       HPI  Wendi Mart is a 59 year old male who presents with 2 cm epidermoid cyst of the back.  Prior lower back cyst excised with Dr. Mendez.  Taking aspirin and fish oil    Past Medical History[1]  Past Surgical History[2]  Current Medications[3]  ALLERGIES  Allergies[4]    Set as collapsible by default.[5]  Family History[6]    Review of Systems   A comprehensive 10 point review of systems was completed.  Pertinent positives and negatives noted in the the HPI.    PHYSICAL EXAM   /82 (BP Location: Right arm, Patient Position: Sitting, Cuff Size: adult)   Pulse 60  No LMP for male patient.   Constitutional: appears well hydrated alert and responsive no acute distress noted  Head/Face: normocephalic  Nose/Mouth/Throat: nose and throat are clear palate is intact mucous membranes are moist no oral lesions are noted  Neck/Thyroid: neck is supple without adenopathy  Respiratory: normal to inspection lungs are clear to auscultation bilaterally normal respiratory effort    2 cm left mid back epidermoid cyst  Cardiovascular: regular rate and rhythm no murmurs, gallups, or rubs  Abdomen: soft non-tender non-distended no organomegaly noted no masses  Extremities: no edema, cyanosis, or clubbing  Neurological: exam appropriate for age reflexes and motor skills appropriate for age      ASSESSMENT/PLAN   Assessment       59 year old male with epidermoid cyst  We have discussed the surgical risks, benefits, alternatives, and expected recovery. We will plan excision epidermoid cyst in the office under local anesthesia.  Stop aspirin and fish oil for 5 days prior.. All of the patient's questions have been answered to his satisfaction.       7/14/2025  Pancho Ugalde MD               [1]   Past Medical History:   Atherosclerosis of coronary artery    Stenting of Left Circumflex and Ramus     Coronary atherosclerosis    Essential hypertension    Heart disease    High blood pressure    High cholesterol    Hyperlipidemia   [2]   Past Surgical History:  Procedure Laterality Date    Angioplasty (coronary)  8/2015    Cabg      x 4    Cath drug eluting stent  06/02/2015    Colonoscopy N/A 07/03/2018    Procedure: COLONOSCOPY;  Surgeon: Skinny Shi MD;  Location: [facility] ENDOSCOPY    Incision of pyloric muscle  1967    Skin surgery Left 12/10/2020    epidermal inclusion cyst, lower back   [3]   Current Outpatient Medications   Medication Sig Dispense Refill    Omega-3 Fatty Acids (FISH OIL OR) Take by mouth.      Fluocin-Hydroquinone-Tretinoin (TRI-LAITH) 0.01-4-0.05 % External Cream Use qohs as directed 30 g 5    rosuvastatin 40 MG Oral Tab Take 1 tablet (40 mg total) by mouth nightly. 30 tablet 3    Multiple Vitamin Oral Tab Take 1 tablet by mouth.      aspirin 81 MG Oral Tab EC Take 1 tablet (81 mg total) by mouth in the morning.      carvedilol 6.25 MG Oral Tab Take 1 tablet (6.25 mg total) by mouth 2 (two) times daily with meals. 180 tablet 3    PANTOPRAZOLE 40 MG Oral Tab EC TAKE 1 TABLET(40 MG) BY MOUTH BEFORE BREAKFAST (Patient taking differently: Take 1 tablet (40 mg total) by mouth daily as needed (indigestion).) 90 tablet 0    Ciclopirox 8 % External Solution Apply topically to affected site daily, wash off once per week 19.8 mL 2    montelukast 10 MG Oral Tab Take 1 tablet (10 mg total) by mouth daily. (Patient taking differently: Take 1 tablet (10 mg total) by mouth daily as needed (allergies).) 90 tablet 3    Tazarotene 0.1 % External Cream USE AT BEDTIME AS DIRECTED 30 g 3    triamcinolone 0.1 % External Cream Apply 1 Application topically 2 (two) times daily. To areas of itching/ eczema 80 g 3    saccharomyces boulardii (FLORASTOR) 250 MG Oral Cap Take 1 capsule (250 mg total) by mouth 2 (two) times daily. (Patient taking differently: Take 1 capsule (250 mg total) by mouth daily.) 60  capsule 0   [4]   Allergies  Allergen Reactions    Seasonal OTHER (SEE COMMENTS)     Seasonal spring   [5]   Social History  Socioeconomic History    Marital status:     Number of children: 2   Occupational History    Occupation: DATA  ANALYST     Employer: [employer]   Tobacco Use    Smoking status: Never    Smokeless tobacco: Never   Vaping Use    Vaping status: Never Used   Substance and Sexual Activity    Alcohol use: No    Drug use: No    Sexual activity: Yes     Partners: Female   [6]   Family History  Problem Relation Age of Onset    Heart Disease Father         premature CAD (cause of death)    Lipids Mother         hyperlipidemia    Stroke Paternal Grandmother         CVA    Diabetes Other         family h/o    Stroke Other         family h/o CVA    Stroke Cousin         CVA    Glaucoma Neg     Macular degeneration Neg

## 2025-07-22 ENCOUNTER — PATIENT MESSAGE (OUTPATIENT)
Facility: CLINIC | Age: 60
End: 2025-07-22

## 2025-07-22 DIAGNOSIS — R10.13 EPIGASTRIC PAIN: ICD-10-CM

## 2025-07-22 DIAGNOSIS — R14.0 BLOATING: Primary | ICD-10-CM

## 2025-07-23 NOTE — TELEPHONE ENCOUNTER
Ami    Patient provided below update as advised  Should he proceed with an ultrasound?    Thank you

## 2025-07-24 NOTE — TELEPHONE ENCOUNTER
I spoke to Wendi and let him know that Ami placed an order for an ultrasound  He had a \"tummy ache\" for 2 days then it went away for 2 days.  Asking if still needs this since ok the past 2 days.  I told him to set up the ultrasound because they book out and if he goes several days with no symptoms and feeling well to let us know and will ask Ami if still needed.  Voiced understanding of plan.  He wanted the number to central scheduling [facility] since he was at work.

## 2025-07-28 ENCOUNTER — OFFICE VISIT (OUTPATIENT)
Dept: SURGERY | Facility: CLINIC | Age: 60
End: 2025-07-28
Payer: COMMERCIAL

## 2025-07-28 VITALS — DIASTOLIC BLOOD PRESSURE: 79 MMHG | SYSTOLIC BLOOD PRESSURE: 130 MMHG | HEART RATE: 69 BPM

## 2025-07-28 DIAGNOSIS — L72.0 EPIDERMOID CYST: Primary | ICD-10-CM

## 2025-07-28 NOTE — PROCEDURES
General surgery procedure note    Timeout performed and consent signed.  Back prepped and draped with Betadine in a sterile fashion.  1% lidocaine with epinephrine was infiltrated.  2 cm incision is made and a sebaceous cyst is completely excised.  Wound is irrigated closed using interrupted 4-0 Monocryl.  Benzoin Steri-Strips were placed.  He tolerated this well

## 2025-07-28 NOTE — PROGRESS NOTES
General surgery note    Patient returns for back cyst removal.  Risk benefits options explained.  Please see procedure note

## 2025-07-28 NOTE — PATIENT INSTRUCTIONS
Ice pack as needed.  Tylenol or ibuprofen for any discomfort    Remove outer dressing in 48 hours.  Okay to shower and get wet tomorrow.  No public pool for 1 week.    Call for problems

## 2025-08-30 ENCOUNTER — HOSPITAL ENCOUNTER (OUTPATIENT)
Dept: ULTRASOUND IMAGING | Facility: HOSPITAL | Age: 60
Discharge: HOME | End: 2025-08-30
Attending: PHYSICIAN ASSISTANT

## 2025-08-30 DIAGNOSIS — R10.13 EPIGASTRIC PAIN: ICD-10-CM

## 2025-08-30 DIAGNOSIS — R14.0 BLOATING: ICD-10-CM

## 2025-08-30 PROCEDURE — 76705 ECHO EXAM OF ABDOMEN: CPT | Performed by: PHYSICIAN ASSISTANT

## (undated) DIAGNOSIS — Z95.1 S/P CABG (CORONARY ARTERY BYPASS GRAFT): Primary | ICD-10-CM

## (undated) DIAGNOSIS — M25.512 LEFT SHOULDER PAIN, UNSPECIFIED CHRONICITY: Primary | ICD-10-CM

## (undated) DIAGNOSIS — Z02.9 ENCOUNTERS FOR UNSPECIFIED ADMINISTRATIVE PURPOSE: ICD-10-CM

## (undated) DEVICE — SOL  .9 1000ML BTL

## (undated) DEVICE — CATH SECURING DEVICE STATLOCK

## (undated) DEVICE — Device: Brand: DEFENDO AIR/WATER/SUCTION AND BIOPSY VALVE

## (undated) DEVICE — Device: Brand: VIRTUOSAPH PLUS WITH RADIAL INDICATION

## (undated) DEVICE — FLOSEAL HEMOSTATIC MATRIX, 5ML: Brand: FLOSEAL HEMOSTATIC MATRIX

## (undated) DEVICE — PACK CUSTOM TUBING

## (undated) DEVICE — 32 FR STRAIGHT – SOFT PVC CATHETER: Brand: PVC THORACIC CATHETERS

## (undated) DEVICE — SUTURE VICRYL 2-0 CT-1

## (undated) DEVICE — ENDOSCOPY PACK - LOWER: Brand: MEDLINE INDUSTRIES, INC.

## (undated) DEVICE — SNARE 9MM 230CM 2.4MM EXACTO

## (undated) DEVICE — STERILE TETRA-FLEX CF, ELASTIC BANDAGE, 4" X 5.5YD: Brand: TETRA-FLEX™CF

## (undated) DEVICE — SUTURE PROLENE 7-0 8701H

## (undated) DEVICE — 32 FR RIGHT ANGLE – SOFT PVC CATHETER: Brand: PVC THORACIC CATHETERS

## (undated) DEVICE — SUTURE SURGICAL STEEL #7

## (undated) DEVICE — CS5/5+ FASTPACK, 225ML 150U RES: Brand: HAEMONETICS CELL SAVER 5/5+ SYSTEMS

## (undated) DEVICE — SUTURE NON ABS 3-0 30INL MONO

## (undated) DEVICE — SPONGE LAPAROTOMY 18X18IN 7IN

## (undated) DEVICE — ABSORBABLE HEMOSTAT (OXIDIZED REGENERATED CELLULOSE, U.S.P.): Brand: SURGICEL

## (undated) DEVICE — SUCTION CANISTER, 3000CC,SAFELINER: Brand: DEROYAL

## (undated) DEVICE — 12 ML SYRINGE LUER-LOCK TIP: Brand: MONOJECT

## (undated) DEVICE — SUTURE SILK 1-0 SA87G

## (undated) DEVICE — PAD PLMM SLVR 12.5X4IN SLVR

## (undated) DEVICE — SURGICEL FIBULAR 2X4

## (undated) DEVICE — STABILIZER SRG UNV AST CABG

## (undated) DEVICE — MINI-BLADE®: Brand: BEAVER®

## (undated) DEVICE — GAMMEX® PI HYBRID SIZE 8, STERILE POWDER-FREE SURGICAL GLOVE, POLYISOPRENE AND NEOPRENE BLEND: Brand: GAMMEX

## (undated) DEVICE — SPONGE LAP 18X18IN 7IN LOOP

## (undated) DEVICE — DEVICE BLWR/MSTR ACCUMIST ATCH

## (undated) DEVICE — LEAD BIPOLAR TEMP 6495XF53

## (undated) DEVICE — SUTURE VICRYL 1-0 J977H

## (undated) DEVICE — HEART DRAPE AND SUPPLY: Brand: MEDLINE INDUSTRIES, INC.

## (undated) DEVICE — CARTRIDGE HC HMS+ CRTDG SYR

## (undated) DEVICE — EZ GLIDE AORTIC CANNULA: Brand: EDWARDS LIFESCIENCES EZ GLIDE AORTIC CANNULA

## (undated) DEVICE — STERILE (10.2 X 147CM) TELESCOPICALLY-FOLDED COVER: Brand: CIV-FLEX™ TRANSDUCER COVER

## (undated) DEVICE — [HIGH FLOW INSUFFLATOR,  DO NOT USE IF PACKAGE IS DAMAGED,  KEEP DRY,  KEEP AWAY FROM SUNLIGHT,  PROTECT FROM HEAT AND RADIOACTIVE SOURCES.]: Brand: PNEUMOSURE

## (undated) DEVICE — RETROGRADE CARDIOPLEGIA CATHETER: Brand: EDWARDS LIFESCIENCES RETROGRADE CARDIOPLEGIA CATHETER

## (undated) DEVICE — SUTURE SILK 2-0 SA85H

## (undated) DEVICE — TRAY CATH BDX 16FR 350ML FL

## (undated) DEVICE — CATH THORACIC 32F 5 EYLT

## (undated) DEVICE — SUTURE PROLENE 4-0 RB-1

## (undated) DEVICE — SUTURE SILK 2-0 SH

## (undated) DEVICE — TRAP 4 CPTR CHMBR N EZ INLN

## (undated) DEVICE — ELEC DEFIB QUIK COMBO

## (undated) DEVICE — CONNECTOR PRFSN QCK PRM .25IN

## (undated) DEVICE — DRAIN CHEST DRY ADULT/PED

## (undated) DEVICE — HEMOCLIP HORIZON SM MULTI

## (undated) DEVICE — SUTURE ETHIBOND 0 CT-1

## (undated) DEVICE — ADAPTER CRDPLG ANTGRD RTRGD 3W

## (undated) DEVICE — STERILE TETRA-FLEX CF, ELASTIC BANDAGE LATEX FREE 6IN X5.5 YD: Brand: TETRA-FLEX™CF

## (undated) DEVICE — UNDYED BRAIDED (POLYGLACTIN 910), SYNTHETIC ABSORBABLE SUTURE: Brand: COATED VICRYL

## (undated) DEVICE — 3M™ BAIR HUGGER® UNDERBODY BLANKET, FULL ACCESS, 10 PER CASE 63500: Brand: BAIR HUGGER™

## (undated) DEVICE — ALARM PT PTCH LVL

## (undated) DEVICE — INSERT SUTURE OPEN HEART

## (undated) DEVICE — SOL  .9 1000ML BAG

## (undated) DEVICE — PUNCH AORT 4MM 8IN ROT BLT TIP

## (undated) DEVICE — HEART A: Brand: MEDLINE INDUSTRIES, INC.

## (undated) DEVICE — SUTURE PDS II 2-0 CT-1

## (undated) DEVICE — NDL CNTR 40CT FM MAG: Brand: MEDLINE INDUSTRIES, INC.

## (undated) DEVICE — SUTURE PROLENE 6-0 C-1

## (undated) DEVICE — PACK ASSRY CUSTOM TUBING

## (undated) DEVICE — 12 FOOT DISPOSABLE EXTENSION CABLE WITH SAFE CONNECT / SCREW-DOWN

## (undated) DEVICE — FORESIGHT LARGE SENSOR: Brand: FORESIGHT

## (undated) DEVICE — DRAPE SLUSH/WARMER W/DISC

## (undated) DEVICE — SUTURE MONOCRYL 3-0 Y936H

## (undated) DEVICE — SUTURE SILK 4-0 SA63H

## (undated) NOTE — LETTER
Eloina Amanda [address], [place], [place]  [address]  INFORMED CONSENT FOR TRANSFUSION OF BLOOD OR BLOOD PRODUCTS  My physician has informed me of the nature, purpose, benefits and risks of transfusion for blood and blood components that he/she may deem necessary during my treatment or hospitalization. He/she has also discussed alternatives to receiving blood from the voluntary blood supply with me, such as self-donation (autologous) and directed donation (blood donated by family or friends to be used specifically for me). I further understand that while the [address] [place] [address] will attempt to supply any autologous or directed donor blood prior to transfusion of blood from the routine blood supply, medical circumstances may require that other or additional blood components may be required for my care. In giving consent, I acknowledge that my physician has also informed me that despite careful screening and testing in accordance with national and regional regulations, there is still a small risk of transmission of infectious agents including hepatitis, HIV-1/2, cytomegalovirus and other viruses or diseases as yet unknown for which licensed definitive screening tests do not currently exist. Additionally, my physician has informed me of the potential for transfusion reactions not related to an infectious agent. [  ]  Check here for Recurring Outpatient Transfusion Therapy (valid for 1 year) In addition to the above, my physician has informed me that I shall receive numerous transfusions over a period of time and that these can lead to other increased risks. I hereby authorize the transfusion of blood and/or blood products to me as deemed necessary and ordered by physicians participating in my care.  My physician has given me the opportunity to ask questions and any questions asked have been answered to my satisfaction  __________________________________________ ______________________________________________  (Signature of Patient)                                                            (Responsible party in case of Minor,                                                                                                 Incompetent, or unconscious Patient)  ___________________________________________       ________ ______________________________________  (Relationship to Patient)                                                       (Signature of Witness)  ______________________________________________________________________________________________   (Date)                                                                           (Time)  REFUSAL OF CONSENT FOR BLOOD TRANSFUSIONS   Sign only if Refusing   [  ] I understand refusal of blood or blood products as deemed necessary by my physician may have serious consequences to my condition to include possible death.  I hereby assume responsibility for my refusal and release the hospital, its personnel, and my physicians from any responsibility for the consequences of my refusal.    ________________________________________________________________________________  (Signature of Patient)                                                         (Responsible Party/Relationship to Patient)    ________________________________________________________________________________  (Signature of Witness)                                                       (Date/Time)     Patient Name: Alayna Chris     : 1965                 Printed: 3/18/2022      Medical Record #: [id number]                                 Page 1 of 1

## (undated) NOTE — LETTER
Patient: Nayeli Webb   YOB: 1965   Date of Visit: 11/20/2020     Dear  Dr. Blanca Justin MD,    Thank you for referring Nayeli Webb to my practice. Please find my assessment and plan below.           Sebaceous cyst  (primary encounter

## (undated) NOTE — LETTER
Patient: Audelia Corona   YOB: 1965   Date of Visit: 11/20/2020     Dear  Dr. Yaz Rosalse MD,    Thank you for referring Audelia Corona to my practice. Please find my assessment and plan below.                Sincerely,   Bal Freitas MD

## (undated) NOTE — LETTER
[address] [place] [address]      Authorization for Surgical Operation and Procedure     Date:___________                                                                                                         Time:__________  1. I hereby Nat Corona MD, my physician and his/her assistants (if applicable), which may include medical students, residents, and/or fellows, to perform the following surgical operation/ procedure and administer such anesthesia as may be determined necessary by my physician:  Operation/Procedure name (s) CORONARY ARTERY BYPASS GRAFT, POSSIBLE ENDOSCOPIC VEIN HARVEST  on Monic[place]imo [place]   2. I recognize that during the surgical operation/procedure, unforeseen conditions may necessitate additional or different procedures than those listed above. I, therefore, further authorize and request that the above-named surgeon, assistants, or designees perform such procedures as are, in their judgment, necessary and desirable. 3.   My surgeon/physician has discussed prior to my surgery the potential benefits, risks and side effects of this procedure; the likelihood of achieving goals; and potential problems that might occur during recuperation. They also discussed reasonable alternatives to the procedure, including risks, benefits, and side effects related to the alternatives and risks related to not receiving this procedure. I have had all my questions answered and I acknowledge that no guarantee has been made as to the result that may be obtained. 4.   Should the need arise during my operation or immediate post-operative period, I also consent to the administration of blood and/or blood products. Further, I understand that despite careful testing and screening of blood or blood products by collecting agencies, I may still be subject to ill effects as a result of receiving a blood transfusion and/or blood products.   The following are some, but not all, of the potential risks that can occur: fever and allergic reactions, hemolytic reactions, transmission of diseases such as Hepatitis, AIDS and Cytomegalovirus (CMV) and fluid overload. In the event that I wish to have an autologous transfusion of my own blood, or a directed donor transfusion. I will discuss this with my physician. 5.   I authorize the use of any specimen, organs, tissues, body parts or foreign objects that may be removed from my body during the operation/procedure for diagnosis, research or teaching purposes and their subsequent disposal by hospital authorities. I also authorize the release of specimen test results and/or written reports to my treating physician on the hospital medical staff or other referring or consulting physicians involved in my care, at the discretion of the Pathologist or my treating physician. 6.   I consent to the photographing or videotaping of the operations or procedures to be performed, including appropriate portions of my body for medical, scientific, or educational purposes, provided my identity is not revealed by the pictures or by descriptive texts accompanying them. If the procedure has been photographed/videotaped, the surgeon will obtain the original picture, image, videotape or CD. The hospital will not be responsible for storage, release or maintenance of the picture, image, tape or CD.    7.   I consent to the presence of a  or observers in the operating room as deemed necessary by my physician or their designees. 8.   I recognize that in the event my procedure results in extended X-Ray/fluoroscopy time, I may develop a skin reaction. 9. If I have a Do Not Attempt Resuscitation (DNAR) order in place, that status will be suspended while in the operating room, procedural suite, and during the recovery period unless otherwise explicitly stated by me (or a person authorized to consent on my behalf).  The surgeon or my attending physician will determine when the applicable recovery period ends for purposes of reinstating the DNAR order. 10. Patients having a sterilization procedure: I understand that if the procedure is successful the results will be permanent and it will therefore be impossible for me to inseminate, conceive, or bear children. I also understand that the procedure is intended to result in sterility, although the result has not been guaranteed. 11. I acknowledge that my physician has explained sedation/analgesia administration to me including the risk and benefits I consent to the administration of sedation/analgesia as may be necessary or desirable in the judgment of my physician. I CERTIFY THAT I HAVE READ AND FULLY UNDERSTAND THE ABOVE CONSENT TO OPERATION and/or OTHER PROCEDURE.    _________________________________________  __________________________________  Signature of Patient     Signature of Responsible Person         ___________________________________         Printed Name of Responsible Person           _________________________________                  Relationship to Patient  _________________________________________  ______________________________  Signature of Witness          Date  Time    STATEMENT OF PHYSICIAN My signature below affirms that prior to the time of the procedure; I have explained to the patient and/or his/her legal representative, the risks and benefits involved in the proposed treatment and any reasonable alternative to the proposed treatment. I have also explained the risks and benefits involved in refusal of the proposed treatment and alternatives to the proposed treatment and have answered the patient's questions. If I have a significant financial interest in a co-management agreement or a significant financial interest in any product or implant, or other significant relationship used in this procedure/surgery, I have disclosed this and had a discussion with my patient. _______________________________________________________________ _____________________________  Divya Sorensen Physician)                                                                                         (Date)                                   (Time)        Patient Name: Zonia Sorensen    : 1965   Printed: 3/18/2022      Medical Record #: [id number]                                              Page 1 of 1

## (undated) NOTE — LETTER
3/18/2020              [address]        [address] [place] [address] [phone]         To whom it may concern,      Toni Singleton is under my medical care.   He has underlying medical conditions that make him a higher risk if he shou

## (undated) NOTE — LETTER
Date & Time: 4/15/2025, 1:06 PM  Patient: Wendi Mart  Encounter Provider(s):    Doyle Jones MD Yang, Chang, MD Knight, Margaret, DO Hashmi, Samiya, MD       To Whom It May Concern:    Wendi Mart was seen and treated in our department on 4/13/2025 - 4/15/2025. He should not return to work until 4/17/2025 .    If you have any questions or concerns, please do not hesitate to call.        _____________________________  RN Signature

## (undated) NOTE — LETTER
12/10/2018              Wendi Mart        [address]        [place] [place] [address] [phone]         To Whom It May Concern:      Wendi Mart is under my care for high blood pressure. He should be checking his blood pressure at home.  He requir

## (undated) NOTE — LETTER
[facility]  Administration of Anesthesia  1. IBetty, or _________________________________ acting on his behalf, (Patient) (Dependent/Representative) request to receive anesthesia for my pending procedure/operation/treatment. A physician (anesthesiologist) alone or an anesthesiologist working with a nurse anesthetist may administer my anesthesia. 2. I understand that my anesthesiologist is not an employee or agent of the hospital, but is an independent medical practitioner who has been permitted to use its facilities for the care and treatment of his/her patients. 3. I acknowledge that a physician from [facility] or their designate(s), recommended anesthesia for me using her/his medical judgment. The type(s) of anesthesia I may receive include:                a) General Anesthesia, b) Spinal/Epidural Anesthesia, c) Regional Anesthesia or d) Monitored Anesthesia Care. 4. If my spinal, regional or monitored anesthesia care (local) is not satisfactory for my comfort, or if my medical condition requires, I consent to the administration of general anesthesia. 5. I am aware that the practice of anesthesiology is not an exact science and that some foreseeable risks or consequences may occur. Some common risks/consequences include sore throat and hoarseness, nausea and vomiting, muscle soreness, backache, damage to the mouth/teeth/vocal cords and eye injury. I understand that more rare but serious potential risks of anesthesia include blood pressure changes, drug reactions, cardiac arrest, brain damage, paralysis or death. These risks apply to whether I have general, spinal/epidural, regional or monitored anesthesia care. 6. OBSTETRIC PATIENTS: Specific risks/consequences of spinal/epidural anesthesia may include itching, low blood pressure, difficulty urinating, slowing of the baby's heart rate and headache.  Rare risks include infections, high spinal block, spinal bleeding, seizure, cardiac arrest and death. 7. AWARENESS: I understand that it is possible (but unlikely) to have explicit memory of events from the operating room while under general anesthesia. 8. ELECTROCONVULSIVE THERAPY PATIENTS: This consent serves for all treatments in a single course of therapy. 9. I understand that I must inform my anesthesiologist when I last ate and/or drank to minimize the risk of anesthesia. 10. If I am pregnant, or may pregnant, I understand that elective surgery should be postponed until after the baby is born. Anesthetics cross the placenta and may temporarily anesthetize the baby. Although fetal complications of anesthesia during pregnancy are rare, they may include birth defects, premature labor, brain damage and death. 11. I certify that I informed the anesthesiologist, to the best of my ability, about medication I take including blood thinners, anticoagulants, herbal remedies, narcotics and recreational drugs (e.g. cocaine, marijuana, PCP). Failure to inform my anesthesiologist about these medicines may increase my risk of anesthetic complications. The nature and purpose of my anesthetic management was explained to me. I had the opportunity to ask questions and the answers and information provided meet my satisfaction.   I retain the right to withdraw this consent at any time prior to the administration of said anesthetic.    ___________________________________________________           _____________________________________________________  Patient Signature                                                                                      Witness Signature                ___________________________________________________           _____________________________________________________  Date/Time                                                                                               Responsible person in case of minor/ unconscious pt /Relationship    My signature below affirms that prior to the time of the procedure, I have explained to the patient and/or his/her guardian, the risks and benefits of undergoing anesthesia, as well as any reasonable alternatives.     ___________________________________________________            _____________________________________________________  Physician Signature                            Date/Time  Patient Name: Bhavna Vega     : 1965     Printed: 3/18/2022      Medical Record #: [id number]                              Page 1 of 1    ----------ANESTHESIA CONSENT----------

## (undated) NOTE — ED AVS SNAPSHOT
Raifa Dumont   MRN: [id number]    Department:  [facility] Emergency Department   Date of Visit:  5/16/2019           Disclosure     Insurance plans vary and the physician(s) referred by the ER may not be covered by your plan.  Please contact CARE PHYSICIAN AT ONCE OR RETURN IMMEDIATELY TO THE EMERGENCY DEPARTMENT. If you have been prescribed any medication(s), please fill your prescription right away and begin taking the medication(s) as directed.   If you believe that any of the medications

## (undated) NOTE — LETTER
[facility] [address]  [place] [id number]  PH: [phone]     Date:  3/28/2022     Patient:  Audrey Gannon Barron         To Whom it may concern: This letter has been written at the patient's request. The above patient was seen at the [facility] on 03/24/2022 for a surgical procedure    Please excuse patient from work until clearance from physician. No lifting/pushing, or pulling greater than 10 pounds for 3 months.  No driving for one month        Sincerely,  Mark Gupta, RN

## (undated) NOTE — LETTER
12/1/2020          To Whom It May Concern:    Wendi Mart is currently under my medical care and is scheduled for a surgical procedure on Thursday, December 10, 2020. Please excuse Wendi on December 10, 2020 .   He may return to on December 14, 20

## (undated) NOTE — ED AVS SNAPSHOT
Robina Ugalde   MRN: [id number]    Department:  [facility] Emergency Department   Date of Visit:  9/11/2018           Disclosure     Insurance plans vary and the physician(s) referred by the ER may not be covered by your plan.  Please contact CARE PHYSICIAN AT ONCE OR RETURN IMMEDIATELY TO THE EMERGENCY DEPARTMENT. If you have been prescribed any medication(s), please fill your prescription right away and begin taking the medication(s) as directed.   If you believe that any of the medications

## (undated) NOTE — LETTER
11/20/2020              [address]        [address]        [facility] [phone]         Dear Jerald Campbell,      Sincerely,    Neto Waters MD  [address] [address]  [phone]

## (undated) NOTE — LETTER
2/28/2025      Wendi Quintanaoo  [address]  [facility] [phone]      RE: Appointment Needed for Continuous Medication Refill     Dear Wendi Mart:    Your recent request for a medication refill has been sent to your pharmacy.  A 90 day supply was ordered for you.    Your health care is very important to us.   Part of this process is monitoring your medication needs.  Our records show that your last appointment was 09/12/2023.  We have tried unsuccessfully to contact you to schedule an appointment with us.  Please call our office as soon as possible to arrange your next visit with Ami Kim PA-C .  We will not be able to refill any medication until your next appointment to help us to properly manage your health.      Please call [phone] during regular office hours to schedule your next appointment.      Thank you,    [facility]